# Patient Record
Sex: FEMALE | Race: WHITE | Employment: UNEMPLOYED | URBAN - NONMETROPOLITAN AREA
[De-identification: names, ages, dates, MRNs, and addresses within clinical notes are randomized per-mention and may not be internally consistent; named-entity substitution may affect disease eponyms.]

---

## 2017-01-27 ENCOUNTER — OFFICE VISIT (OUTPATIENT)
Dept: OBGYN | Age: 37
End: 2017-01-27
Payer: COMMERCIAL

## 2017-01-27 VITALS
DIASTOLIC BLOOD PRESSURE: 84 MMHG | HEIGHT: 63 IN | SYSTOLIC BLOOD PRESSURE: 119 MMHG | WEIGHT: 146 LBS | HEART RATE: 107 BPM | BODY MASS INDEX: 25.87 KG/M2

## 2017-01-27 DIAGNOSIS — L72.3 SEBACEOUS CYST: ICD-10-CM

## 2017-01-27 DIAGNOSIS — N90.89 SKIN TAG OF FEMALE PERINEUM: ICD-10-CM

## 2017-01-27 DIAGNOSIS — Z01.419 WELL WOMAN EXAM WITH ROUTINE GYNECOLOGICAL EXAM: Primary | ICD-10-CM

## 2017-01-27 DIAGNOSIS — Z12.4 CERVICAL CANCER SCREENING: ICD-10-CM

## 2017-01-27 DIAGNOSIS — N90.60 LABIAL HYPERTROPHY: ICD-10-CM

## 2017-01-27 PROCEDURE — 99395 PREV VISIT EST AGE 18-39: CPT | Performed by: OBSTETRICS & GYNECOLOGY

## 2017-01-27 RX ORDER — AZELASTINE HCL 205.5 UG/1
2 SPRAY NASAL 2 TIMES DAILY PRN
COMMUNITY
Start: 2016-11-01 | End: 2017-11-09

## 2017-01-27 RX ORDER — RIZATRIPTAN BENZOATE 5 MG/1
5 TABLET ORAL
COMMUNITY
Start: 2016-10-24 | End: 2020-02-11 | Stop reason: SDUPTHER

## 2017-01-27 ASSESSMENT — ENCOUNTER SYMPTOMS
RESPIRATORY NEGATIVE: 1
EYES NEGATIVE: 1
GASTROINTESTINAL NEGATIVE: 1

## 2017-02-10 ENCOUNTER — HOSPITAL ENCOUNTER (OUTPATIENT)
Dept: PREADMISSION TESTING | Age: 37
Discharge: HOME | End: 2017-02-10
Payer: COMMERCIAL

## 2017-02-10 ENCOUNTER — OFFICE VISIT (OUTPATIENT)
Dept: OBGYN | Age: 37
End: 2017-02-10

## 2017-02-10 VITALS
WEIGHT: 148 LBS | SYSTOLIC BLOOD PRESSURE: 100 MMHG | HEIGHT: 63 IN | DIASTOLIC BLOOD PRESSURE: 60 MMHG | BODY MASS INDEX: 26.22 KG/M2

## 2017-02-10 VITALS — BODY MASS INDEX: 26.22 KG/M2 | WEIGHT: 148 LBS | HEIGHT: 63 IN

## 2017-02-10 DIAGNOSIS — N90.89 SKIN TAG OF FEMALE PERINEUM: ICD-10-CM

## 2017-02-10 DIAGNOSIS — N94.10 DYSPAREUNIA IN FEMALE: ICD-10-CM

## 2017-02-10 DIAGNOSIS — Z01.818 PRE-OP EXAMINATION: Primary | ICD-10-CM

## 2017-02-10 DIAGNOSIS — L72.3 SEBACEOUS CYST: ICD-10-CM

## 2017-02-10 DIAGNOSIS — N90.60 LABIAL HYPERTROPHY: ICD-10-CM

## 2017-02-10 LAB
BASOPHILS ABSOLUTE: 0 K/UL (ref 0–0.2)
BASOPHILS RELATIVE PERCENT: 0.2 % (ref 0–1)
EOSINOPHILS ABSOLUTE: 0.3 K/UL (ref 0–0.6)
EOSINOPHILS RELATIVE PERCENT: 3.4 % (ref 0–5)
HCT VFR BLD CALC: 42.4 % (ref 37–47)
HEMOGLOBIN: 14.5 G/DL (ref 12–16)
LYMPHOCYTES ABSOLUTE: 2.6 K/UL (ref 1.1–4.5)
LYMPHOCYTES RELATIVE PERCENT: 26.7 % (ref 20–40)
MCH RBC QN AUTO: 33.1 PG (ref 27–31)
MCHC RBC AUTO-ENTMCNC: 34.2 G/DL (ref 33–37)
MCV RBC AUTO: 96.8 FL (ref 81–99)
MONOCYTES ABSOLUTE: 0.7 K/UL (ref 0–0.9)
MONOCYTES RELATIVE PERCENT: 7.4 % (ref 0–10)
NEUTROPHILS ABSOLUTE: 6.1 K/UL (ref 1.5–7.5)
NEUTROPHILS RELATIVE PERCENT: 62.3 % (ref 50–65)
PDW BLD-RTO: 13.6 % (ref 11.5–14.5)
PLATELET # BLD: 331 K/UL (ref 130–400)
PMV BLD AUTO: 10.4 FL (ref 7.4–10.4)
RBC # BLD: 4.38 M/UL (ref 4.2–5.4)
WBC # BLD: 9.7 K/UL (ref 4.8–10.8)

## 2017-02-10 PROCEDURE — PREOPEXAM PRE-OP EXAM: Performed by: OBSTETRICS & GYNECOLOGY

## 2017-02-10 PROCEDURE — 85025 COMPLETE CBC W/AUTO DIFF WBC: CPT

## 2017-02-10 RX ORDER — TOPIRAMATE 50 MG/1
TABLET, FILM COATED ORAL
Refills: 2 | COMMUNITY
Start: 2017-02-01 | End: 2019-02-07 | Stop reason: ALTCHOICE

## 2017-02-10 RX ORDER — TIZANIDINE 4 MG/1
4 TABLET ORAL NIGHTLY
COMMUNITY
End: 2017-03-07

## 2017-02-10 ASSESSMENT — ENCOUNTER SYMPTOMS
EYES NEGATIVE: 1
RESPIRATORY NEGATIVE: 1
ALLERGIC/IMMUNOLOGIC NEGATIVE: 1
GASTROINTESTINAL NEGATIVE: 1

## 2017-02-16 ENCOUNTER — ANESTHESIA EVENT (OUTPATIENT)
Dept: OPERATING ROOM | Age: 37
End: 2017-02-16
Payer: COMMERCIAL

## 2017-02-16 ENCOUNTER — ANESTHESIA (OUTPATIENT)
Dept: OPERATING ROOM | Age: 37
End: 2017-02-16
Payer: COMMERCIAL

## 2017-02-16 ENCOUNTER — HOSPITAL ENCOUNTER (OUTPATIENT)
Age: 37
Setting detail: OUTPATIENT SURGERY
Discharge: HOME | End: 2017-02-16
Attending: OBSTETRICS & GYNECOLOGY | Admitting: OBSTETRICS & GYNECOLOGY
Payer: COMMERCIAL

## 2017-02-16 ENCOUNTER — SURGERY (OUTPATIENT)
Age: 37
End: 2017-02-16

## 2017-02-16 VITALS
OXYGEN SATURATION: 100 % | HEIGHT: 63 IN | RESPIRATION RATE: 16 BRPM | HEART RATE: 70 BPM | BODY MASS INDEX: 26.22 KG/M2 | DIASTOLIC BLOOD PRESSURE: 57 MMHG | SYSTOLIC BLOOD PRESSURE: 106 MMHG | TEMPERATURE: 98 F | WEIGHT: 148 LBS

## 2017-02-16 VITALS
SYSTOLIC BLOOD PRESSURE: 118 MMHG | TEMPERATURE: 97 F | OXYGEN SATURATION: 100 % | DIASTOLIC BLOOD PRESSURE: 52 MMHG | RESPIRATION RATE: 1 BRPM

## 2017-02-16 PROBLEM — N90.89 VULVAR SKIN TAG: Status: ACTIVE | Noted: 2017-02-16

## 2017-02-16 PROBLEM — N90.60 LABIAL HYPERTROPHY: Status: ACTIVE | Noted: 2017-02-16

## 2017-02-16 PROBLEM — N89.8 VAGINAL INCLUSION CYST: Status: ACTIVE | Noted: 2017-02-16

## 2017-02-16 LAB — HCG(URINE) PREGNANCY TEST: NEGATIVE

## 2017-02-16 PROCEDURE — 7100000010 HC PHASE II RECOVERY - FIRST 15 MIN: Performed by: OBSTETRICS & GYNECOLOGY

## 2017-02-16 PROCEDURE — 2580000003 HC RX 258: Performed by: OBSTETRICS & GYNECOLOGY

## 2017-02-16 PROCEDURE — 2500000003 HC RX 250 WO HCPCS: Performed by: OBSTETRICS & GYNECOLOGY

## 2017-02-16 PROCEDURE — 7100000000 HC PACU RECOVERY - FIRST 15 MIN: Performed by: OBSTETRICS & GYNECOLOGY

## 2017-02-16 PROCEDURE — 7100000011 HC PHASE II RECOVERY - ADDTL 15 MIN: Performed by: OBSTETRICS & GYNECOLOGY

## 2017-02-16 PROCEDURE — 6360000002 HC RX W HCPCS: Performed by: NURSE ANESTHETIST, CERTIFIED REGISTERED

## 2017-02-16 PROCEDURE — 2720000003 HC MISC SUTURE/STAPLES/RELOADS/ETC: Performed by: OBSTETRICS & GYNECOLOGY

## 2017-02-16 PROCEDURE — 2720000001 HC MISC SURG SUPPLY STERILE $51-500: Performed by: OBSTETRICS & GYNECOLOGY

## 2017-02-16 PROCEDURE — 56620 VULVECTOMY SIMPLE PARTIAL: CPT | Performed by: OBSTETRICS & GYNECOLOGY

## 2017-02-16 PROCEDURE — 2500000003 HC RX 250 WO HCPCS: Performed by: ANESTHESIOLOGY

## 2017-02-16 PROCEDURE — 3600000004 HC SURGERY LEVEL 4 BASE: Performed by: OBSTETRICS & GYNECOLOGY

## 2017-02-16 PROCEDURE — 3700000000 HC ANESTHESIA ATTENDED CARE: Performed by: OBSTETRICS & GYNECOLOGY

## 2017-02-16 PROCEDURE — 2500000003 HC RX 250 WO HCPCS: Performed by: NURSE ANESTHETIST, CERTIFIED REGISTERED

## 2017-02-16 PROCEDURE — 6360000002 HC RX W HCPCS: Performed by: ANESTHESIOLOGY

## 2017-02-16 PROCEDURE — 6370000000 HC RX 637 (ALT 250 FOR IP): Performed by: ANESTHESIOLOGY

## 2017-02-16 PROCEDURE — 81025 URINE PREGNANCY TEST: CPT

## 2017-02-16 PROCEDURE — 3600000014 HC SURGERY LEVEL 4 ADDTL 15MIN: Performed by: OBSTETRICS & GYNECOLOGY

## 2017-02-16 PROCEDURE — 3700000001 HC ADD 15 MINUTES (ANESTHESIA): Performed by: OBSTETRICS & GYNECOLOGY

## 2017-02-16 PROCEDURE — 6360000002 HC RX W HCPCS: Performed by: OBSTETRICS & GYNECOLOGY

## 2017-02-16 PROCEDURE — 7100000001 HC PACU RECOVERY - ADDTL 15 MIN: Performed by: OBSTETRICS & GYNECOLOGY

## 2017-02-16 PROCEDURE — 6370000000 HC RX 637 (ALT 250 FOR IP): Performed by: OBSTETRICS & GYNECOLOGY

## 2017-02-16 RX ORDER — MIDAZOLAM HYDROCHLORIDE 1 MG/ML
INJECTION INTRAMUSCULAR; INTRAVENOUS PRN
Status: DISCONTINUED | OUTPATIENT
Start: 2017-02-16 | End: 2017-02-16 | Stop reason: SDUPTHER

## 2017-02-16 RX ORDER — ENALAPRILAT 2.5 MG/2ML
1.25 INJECTION INTRAVENOUS
Status: DISCONTINUED | OUTPATIENT
Start: 2017-02-16 | End: 2017-02-16 | Stop reason: HOSPADM

## 2017-02-16 RX ORDER — PROPOFOL 10 MG/ML
INJECTION, EMULSION INTRAVENOUS PRN
Status: DISCONTINUED | OUTPATIENT
Start: 2017-02-16 | End: 2017-02-16 | Stop reason: SDUPTHER

## 2017-02-16 RX ORDER — SCOLOPAMINE TRANSDERMAL SYSTEM 1 MG/1
1 PATCH, EXTENDED RELEASE TRANSDERMAL
Status: DISCONTINUED | OUTPATIENT
Start: 2017-02-16 | End: 2017-02-16 | Stop reason: HOSPADM

## 2017-02-16 RX ORDER — BUPIVACAINE HYDROCHLORIDE AND EPINEPHRINE 2.5; 5 MG/ML; UG/ML
INJECTION, SOLUTION EPIDURAL; INFILTRATION; INTRACAUDAL; PERINEURAL PRN
Status: DISCONTINUED | OUTPATIENT
Start: 2017-02-16 | End: 2017-02-16 | Stop reason: HOSPADM

## 2017-02-16 RX ORDER — OXYCODONE AND ACETAMINOPHEN 7.5; 325 MG/1; MG/1
1 TABLET ORAL EVERY 4 HOURS PRN
Qty: 45 TABLET | Refills: 0 | Status: SHIPPED | OUTPATIENT
Start: 2017-02-16 | End: 2017-02-23

## 2017-02-16 RX ORDER — MORPHINE SULFATE 4 MG/ML
2 INJECTION, SOLUTION INTRAMUSCULAR; INTRAVENOUS EVERY 5 MIN PRN
Status: DISCONTINUED | OUTPATIENT
Start: 2017-02-16 | End: 2017-02-16 | Stop reason: HOSPADM

## 2017-02-16 RX ORDER — OXYCODONE AND ACETAMINOPHEN 7.5; 325 MG/1; MG/1
TABLET ORAL
Status: DISCONTINUED
Start: 2017-02-16 | End: 2017-02-16 | Stop reason: HOSPADM

## 2017-02-16 RX ORDER — LABETALOL HYDROCHLORIDE 5 MG/ML
5 INJECTION, SOLUTION INTRAVENOUS EVERY 10 MIN PRN
Status: DISCONTINUED | OUTPATIENT
Start: 2017-02-16 | End: 2017-02-16 | Stop reason: HOSPADM

## 2017-02-16 RX ORDER — MEPERIDINE HYDROCHLORIDE 25 MG/ML
12.5 INJECTION INTRAMUSCULAR; INTRAVENOUS; SUBCUTANEOUS EVERY 5 MIN PRN
Status: DISCONTINUED | OUTPATIENT
Start: 2017-02-16 | End: 2017-02-16 | Stop reason: HOSPADM

## 2017-02-16 RX ORDER — FENTANYL CITRATE 50 UG/ML
25 INJECTION, SOLUTION INTRAMUSCULAR; INTRAVENOUS
Status: DISCONTINUED | OUTPATIENT
Start: 2017-02-16 | End: 2017-02-16 | Stop reason: HOSPADM

## 2017-02-16 RX ORDER — ONDANSETRON 2 MG/ML
INJECTION INTRAMUSCULAR; INTRAVENOUS PRN
Status: DISCONTINUED | OUTPATIENT
Start: 2017-02-16 | End: 2017-02-16 | Stop reason: SDUPTHER

## 2017-02-16 RX ORDER — EPHEDRINE SULFATE 50 MG/ML
INJECTION, SOLUTION INTRAVENOUS PRN
Status: DISCONTINUED | OUTPATIENT
Start: 2017-02-16 | End: 2017-02-16 | Stop reason: SDUPTHER

## 2017-02-16 RX ORDER — DIPHENHYDRAMINE HYDROCHLORIDE 50 MG/ML
12.5 INJECTION INTRAMUSCULAR; INTRAVENOUS
Status: DISCONTINUED | OUTPATIENT
Start: 2017-02-16 | End: 2017-02-16 | Stop reason: HOSPADM

## 2017-02-16 RX ORDER — LIDOCAINE HYDROCHLORIDE 10 MG/ML
1 INJECTION, SOLUTION EPIDURAL; INFILTRATION; INTRACAUDAL; PERINEURAL
Status: COMPLETED | OUTPATIENT
Start: 2017-02-16 | End: 2017-02-16

## 2017-02-16 RX ORDER — SODIUM CHLORIDE 0.9 % (FLUSH) 0.9 %
10 SYRINGE (ML) INJECTION PRN
Status: DISCONTINUED | OUTPATIENT
Start: 2017-02-16 | End: 2017-02-16 | Stop reason: HOSPADM

## 2017-02-16 RX ORDER — PROMETHAZINE HYDROCHLORIDE 25 MG/ML
6.25 INJECTION, SOLUTION INTRAMUSCULAR; INTRAVENOUS
Status: DISCONTINUED | OUTPATIENT
Start: 2017-02-16 | End: 2017-02-16 | Stop reason: HOSPADM

## 2017-02-16 RX ORDER — HYDRALAZINE HYDROCHLORIDE 20 MG/ML
5 INJECTION INTRAMUSCULAR; INTRAVENOUS EVERY 10 MIN PRN
Status: DISCONTINUED | OUTPATIENT
Start: 2017-02-16 | End: 2017-02-16 | Stop reason: HOSPADM

## 2017-02-16 RX ORDER — OXYCODONE AND ACETAMINOPHEN 7.5; 325 MG/1; MG/1
1 TABLET ORAL
Status: COMPLETED | OUTPATIENT
Start: 2017-02-16 | End: 2017-02-16

## 2017-02-16 RX ORDER — MORPHINE SULFATE 4 MG/ML
1 INJECTION, SOLUTION INTRAMUSCULAR; INTRAVENOUS EVERY 5 MIN PRN
Status: DISCONTINUED | OUTPATIENT
Start: 2017-02-16 | End: 2017-02-16 | Stop reason: HOSPADM

## 2017-02-16 RX ORDER — APREPITANT 40 MG/1
40 CAPSULE ORAL ONCE
Status: COMPLETED | OUTPATIENT
Start: 2017-02-16 | End: 2017-02-16

## 2017-02-16 RX ORDER — ONDANSETRON 2 MG/ML
4 INJECTION INTRAMUSCULAR; INTRAVENOUS
Status: DISCONTINUED | OUTPATIENT
Start: 2017-02-16 | End: 2017-02-16 | Stop reason: HOSPADM

## 2017-02-16 RX ORDER — SODIUM CHLORIDE, SODIUM LACTATE, POTASSIUM CHLORIDE, CALCIUM CHLORIDE 600; 310; 30; 20 MG/100ML; MG/100ML; MG/100ML; MG/100ML
INJECTION, SOLUTION INTRAVENOUS CONTINUOUS
Status: DISCONTINUED | OUTPATIENT
Start: 2017-02-16 | End: 2017-02-16 | Stop reason: HOSPADM

## 2017-02-16 RX ORDER — KETOROLAC TROMETHAMINE 30 MG/ML
INJECTION, SOLUTION INTRAMUSCULAR; INTRAVENOUS PRN
Status: DISCONTINUED | OUTPATIENT
Start: 2017-02-16 | End: 2017-02-16 | Stop reason: SDUPTHER

## 2017-02-16 RX ORDER — FENTANYL CITRATE 50 UG/ML
50 INJECTION, SOLUTION INTRAMUSCULAR; INTRAVENOUS
Status: DISCONTINUED | OUTPATIENT
Start: 2017-02-16 | End: 2017-02-16 | Stop reason: HOSPADM

## 2017-02-16 RX ORDER — DEXAMETHASONE SODIUM PHOSPHATE 4 MG/ML
INJECTION, SOLUTION INTRA-ARTICULAR; INTRALESIONAL; INTRAMUSCULAR; INTRAVENOUS; SOFT TISSUE PRN
Status: DISCONTINUED | OUTPATIENT
Start: 2017-02-16 | End: 2017-02-16 | Stop reason: SDUPTHER

## 2017-02-16 RX ORDER — LIDOCAINE HYDROCHLORIDE 10 MG/ML
1 INJECTION, SOLUTION EPIDURAL; INFILTRATION; INTRACAUDAL; PERINEURAL ONCE
Status: DISCONTINUED | OUTPATIENT
Start: 2017-02-16 | End: 2017-02-16 | Stop reason: HOSPADM

## 2017-02-16 RX ORDER — SODIUM CHLORIDE 0.9 % (FLUSH) 0.9 %
10 SYRINGE (ML) INJECTION EVERY 12 HOURS SCHEDULED
Status: DISCONTINUED | OUTPATIENT
Start: 2017-02-16 | End: 2017-02-16 | Stop reason: HOSPADM

## 2017-02-16 RX ORDER — LIDOCAINE HYDROCHLORIDE 10 MG/ML
INJECTION, SOLUTION EPIDURAL; INFILTRATION; INTRACAUDAL; PERINEURAL PRN
Status: DISCONTINUED | OUTPATIENT
Start: 2017-02-16 | End: 2017-02-16 | Stop reason: SDUPTHER

## 2017-02-16 RX ORDER — FENTANYL CITRATE 50 UG/ML
INJECTION, SOLUTION INTRAMUSCULAR; INTRAVENOUS PRN
Status: DISCONTINUED | OUTPATIENT
Start: 2017-02-16 | End: 2017-02-16 | Stop reason: SDUPTHER

## 2017-02-16 RX ORDER — MIDAZOLAM HYDROCHLORIDE 1 MG/ML
2 INJECTION INTRAMUSCULAR; INTRAVENOUS
Status: DISCONTINUED | OUTPATIENT
Start: 2017-02-16 | End: 2017-02-16 | Stop reason: HOSPADM

## 2017-02-16 RX ADMIN — LIDOCAINE HYDROCHLORIDE 50 MG: 10 INJECTION, SOLUTION EPIDURAL; INFILTRATION; INTRACAUDAL; PERINEURAL at 09:41

## 2017-02-16 RX ADMIN — EPHEDRINE SULFATE 10 MG: 50 INJECTION, SOLUTION INTRAMUSCULAR; INTRAVENOUS; SUBCUTANEOUS at 09:58

## 2017-02-16 RX ADMIN — SODIUM CHLORIDE, SODIUM LACTATE, POTASSIUM CHLORIDE, AND CALCIUM CHLORIDE: 600; 310; 30; 20 INJECTION, SOLUTION INTRAVENOUS at 08:20

## 2017-02-16 RX ADMIN — FENTANYL CITRATE 50 MCG: 50 INJECTION INTRAMUSCULAR; INTRAVENOUS at 09:43

## 2017-02-16 RX ADMIN — MIDAZOLAM HYDROCHLORIDE 2 MG: 1 INJECTION, SOLUTION INTRAMUSCULAR; INTRAVENOUS at 09:39

## 2017-02-16 RX ADMIN — FENTANYL CITRATE 50 MCG: 50 INJECTION INTRAMUSCULAR; INTRAVENOUS at 09:39

## 2017-02-16 RX ADMIN — APREPITANT 40 MG: 40 CAPSULE ORAL at 08:21

## 2017-02-16 RX ADMIN — CEFAZOLIN SODIUM 1 G: 1 INJECTION, SOLUTION INTRAVENOUS at 09:44

## 2017-02-16 RX ADMIN — PROPOFOL 150 MG: 10 INJECTION, EMULSION INTRAVENOUS at 09:41

## 2017-02-16 RX ADMIN — KETOROLAC TROMETHAMINE 30 MG: 30 INJECTION, SOLUTION INTRAMUSCULAR at 09:49

## 2017-02-16 RX ADMIN — ONDANSETRON HYDROCHLORIDE 4 MG: 2 INJECTION, SOLUTION INTRAVENOUS at 09:49

## 2017-02-16 RX ADMIN — OXYCODONE HYDROCHLORIDE AND ACETAMINOPHEN 1 TABLET: 7.5; 325 TABLET ORAL at 12:12

## 2017-02-16 RX ADMIN — DEXAMETHASONE SODIUM PHOSPHATE 5 MG: 4 INJECTION, SOLUTION INTRAMUSCULAR; INTRAVENOUS at 09:49

## 2017-02-16 RX ADMIN — LIDOCAINE HYDROCHLORIDE 1 ML: 10 INJECTION, SOLUTION EPIDURAL; INFILTRATION; INTRACAUDAL; PERINEURAL at 08:20

## 2017-02-16 RX ADMIN — SILVER SULFADIAZINE 1 MG: 10 CREAM TOPICAL at 10:00

## 2017-02-16 RX ADMIN — BUPIVACAINE HYDROCHLORIDE AND EPINEPHRINE 30 ML: 2.5; 5 INJECTION, SOLUTION EPIDURAL; INFILTRATION; INTRACAUDAL; PERINEURAL at 10:00

## 2017-02-16 RX ADMIN — SODIUM CHLORIDE, SODIUM LACTATE, POTASSIUM CHLORIDE, AND CALCIUM CHLORIDE: 600; 310; 30; 20 INJECTION, SOLUTION INTRAVENOUS at 10:29

## 2017-02-16 ASSESSMENT — PAIN DESCRIPTION - DESCRIPTORS
DESCRIPTORS: ACHING
DESCRIPTORS: ACHING

## 2017-02-16 ASSESSMENT — PAIN DESCRIPTION - ONSET
ONSET: AWAKENED FROM SLEEP
ONSET: AWAKENED FROM SLEEP

## 2017-02-16 ASSESSMENT — PAIN DESCRIPTION - FREQUENCY
FREQUENCY: CONTINUOUS
FREQUENCY: CONTINUOUS

## 2017-02-16 ASSESSMENT — PAIN DESCRIPTION - LOCATION
LOCATION: LABIA
LOCATION: LABIA

## 2017-02-16 ASSESSMENT — PAIN - FUNCTIONAL ASSESSMENT: PAIN_FUNCTIONAL_ASSESSMENT: 0-10

## 2017-02-16 ASSESSMENT — PAIN SCALES - GENERAL
PAINLEVEL_OUTOF10: 6
PAINLEVEL_OUTOF10: 0
PAINLEVEL_OUTOF10: 7

## 2017-02-16 ASSESSMENT — PAIN DESCRIPTION - PROGRESSION
CLINICAL_PROGRESSION: NOT CHANGED
CLINICAL_PROGRESSION: NOT CHANGED

## 2017-02-16 ASSESSMENT — PAIN DESCRIPTION - PAIN TYPE
TYPE: SURGICAL PAIN
TYPE: SURGICAL PAIN

## 2017-02-23 ENCOUNTER — TELEPHONE (OUTPATIENT)
Dept: OBGYN | Age: 37
End: 2017-02-23

## 2017-03-07 ENCOUNTER — OFFICE VISIT (OUTPATIENT)
Dept: OBGYN | Age: 37
End: 2017-03-07

## 2017-03-07 VITALS
SYSTOLIC BLOOD PRESSURE: 100 MMHG | HEIGHT: 63 IN | WEIGHT: 145 LBS | DIASTOLIC BLOOD PRESSURE: 60 MMHG | BODY MASS INDEX: 25.69 KG/M2

## 2017-03-07 DIAGNOSIS — Z09 POSTOP CHECK: Primary | ICD-10-CM

## 2017-03-07 PROCEDURE — 99024 POSTOP FOLLOW-UP VISIT: CPT | Performed by: OBSTETRICS & GYNECOLOGY

## 2017-03-07 ASSESSMENT — ENCOUNTER SYMPTOMS
EYES NEGATIVE: 1
RESPIRATORY NEGATIVE: 1
ALLERGIC/IMMUNOLOGIC NEGATIVE: 1

## 2017-03-16 ENCOUNTER — LAB REQUISITION (OUTPATIENT)
Dept: LAB | Facility: HOSPITAL | Age: 37
End: 2017-03-16

## 2017-03-16 DIAGNOSIS — Z00.00 ENCOUNTER FOR GENERAL ADULT MEDICAL EXAMINATION WITHOUT ABNORMAL FINDINGS: ICD-10-CM

## 2017-03-16 PROCEDURE — 87086 URINE CULTURE/COLONY COUNT: CPT | Performed by: INTERNAL MEDICINE

## 2017-03-18 LAB — BACTERIA SPEC AEROBE CULT: ABNORMAL

## 2017-03-21 ENCOUNTER — OFFICE VISIT (OUTPATIENT)
Dept: OBGYN | Age: 37
End: 2017-03-21

## 2017-03-21 VITALS
SYSTOLIC BLOOD PRESSURE: 120 MMHG | HEIGHT: 63 IN | DIASTOLIC BLOOD PRESSURE: 60 MMHG | BODY MASS INDEX: 25.69 KG/M2 | WEIGHT: 145 LBS

## 2017-03-21 DIAGNOSIS — L98.7 REDUNDANT SKIN: ICD-10-CM

## 2017-03-21 DIAGNOSIS — Z09 POSTOP CHECK: Primary | ICD-10-CM

## 2017-03-21 PROCEDURE — 99024 POSTOP FOLLOW-UP VISIT: CPT | Performed by: OBSTETRICS & GYNECOLOGY

## 2017-03-21 RX ORDER — FUROSEMIDE 20 MG/1
20 TABLET ORAL DAILY
COMMUNITY
End: 2021-11-19 | Stop reason: SDUPTHER

## 2017-03-21 ASSESSMENT — ENCOUNTER SYMPTOMS
EYES NEGATIVE: 1
GASTROINTESTINAL NEGATIVE: 1
ALLERGIC/IMMUNOLOGIC NEGATIVE: 1
RESPIRATORY NEGATIVE: 1

## 2017-03-31 ENCOUNTER — TELEPHONE (OUTPATIENT)
Dept: OBGYN | Age: 37
End: 2017-03-31

## 2017-05-03 ENCOUNTER — OFFICE VISIT (OUTPATIENT)
Dept: OBGYN | Age: 37
End: 2017-05-03
Payer: COMMERCIAL

## 2017-05-03 VITALS
WEIGHT: 140 LBS | DIASTOLIC BLOOD PRESSURE: 70 MMHG | HEIGHT: 63 IN | SYSTOLIC BLOOD PRESSURE: 128 MMHG | BODY MASS INDEX: 24.8 KG/M2

## 2017-05-03 DIAGNOSIS — Z09 POSTOPERATIVE FOLLOW-UP: Primary | ICD-10-CM

## 2017-05-03 PROCEDURE — 99213 OFFICE O/P EST LOW 20 MIN: CPT | Performed by: OBSTETRICS & GYNECOLOGY

## 2017-05-03 RX ORDER — ESTRADIOL 0.1 MG/G
1 CREAM VAGINAL
Qty: 1 TUBE | Refills: 5 | Status: SHIPPED | OUTPATIENT
Start: 2017-05-04 | End: 2019-02-07

## 2017-05-03 RX ORDER — CLOBETASOL PROPIONATE 0.5 MG/G
1 OINTMENT TOPICAL 2 TIMES DAILY PRN
Qty: 1 TUBE | Refills: 5 | Status: SHIPPED | OUTPATIENT
Start: 2017-05-03 | End: 2019-02-07

## 2017-05-03 ASSESSMENT — ENCOUNTER SYMPTOMS
GASTROINTESTINAL NEGATIVE: 1
EYES NEGATIVE: 1
ALLERGIC/IMMUNOLOGIC NEGATIVE: 1
RESPIRATORY NEGATIVE: 1

## 2017-07-17 RX ORDER — DICYCLOMINE HYDROCHLORIDE 10 MG/1
10 CAPSULE ORAL DAILY PRN
Qty: 120 CAPSULE | Refills: 0 | Status: SHIPPED | OUTPATIENT
Start: 2017-07-17 | End: 2017-11-02 | Stop reason: SDUPTHER

## 2017-07-17 RX ORDER — ERGOCALCIFEROL 1.25 MG/1
50000 CAPSULE ORAL WEEKLY
Qty: 30 CAPSULE | Refills: 2 | Status: SHIPPED | OUTPATIENT
Start: 2017-07-17 | End: 2017-07-18 | Stop reason: SDUPTHER

## 2017-07-18 RX ORDER — ERGOCALCIFEROL 1.25 MG/1
50000 CAPSULE ORAL WEEKLY
Qty: 30 CAPSULE | Refills: 2 | Status: SHIPPED | OUTPATIENT
Start: 2017-07-18 | End: 2017-07-21 | Stop reason: SDUPTHER

## 2017-07-21 RX ORDER — ERGOCALCIFEROL 1.25 MG/1
CAPSULE ORAL
Qty: 8 CAPSULE | Refills: 2 | Status: SHIPPED | OUTPATIENT
Start: 2017-07-21 | End: 2017-11-09 | Stop reason: SDUPTHER

## 2017-10-24 ENCOUNTER — OFFICE VISIT (OUTPATIENT)
Dept: URGENT CARE | Age: 37
End: 2017-10-24
Payer: MEDICAID

## 2017-10-24 VITALS
WEIGHT: 137.6 LBS | HEIGHT: 63 IN | RESPIRATION RATE: 20 BRPM | HEART RATE: 80 BPM | DIASTOLIC BLOOD PRESSURE: 71 MMHG | OXYGEN SATURATION: 99 % | TEMPERATURE: 101.5 F | BODY MASS INDEX: 24.38 KG/M2 | SYSTOLIC BLOOD PRESSURE: 119 MMHG

## 2017-10-24 DIAGNOSIS — J06.9 URI WITH COUGH AND CONGESTION: ICD-10-CM

## 2017-10-24 DIAGNOSIS — R06.02 SHORTNESS OF BREATH: ICD-10-CM

## 2017-10-24 DIAGNOSIS — J40 BRONCHITIS: Primary | ICD-10-CM

## 2017-10-24 DIAGNOSIS — J02.9 SORE THROAT: ICD-10-CM

## 2017-10-24 DIAGNOSIS — R52 BODY ACHES: ICD-10-CM

## 2017-10-24 DIAGNOSIS — F17.210 CIGARETTE SMOKER: ICD-10-CM

## 2017-10-24 LAB
INFLUENZA A ANTIBODY: NORMAL
INFLUENZA B ANTIBODY: NORMAL
S PYO AG THROAT QL: NORMAL

## 2017-10-24 PROCEDURE — 99213 OFFICE O/P EST LOW 20 MIN: CPT | Performed by: NURSE PRACTITIONER

## 2017-10-24 PROCEDURE — 87804 INFLUENZA ASSAY W/OPTIC: CPT | Performed by: NURSE PRACTITIONER

## 2017-10-24 PROCEDURE — 87880 STREP A ASSAY W/OPTIC: CPT | Performed by: NURSE PRACTITIONER

## 2017-10-24 RX ORDER — AMOXICILLIN AND CLAVULANATE POTASSIUM 875; 125 MG/1; MG/1
1 TABLET, FILM COATED ORAL EVERY 12 HOURS
Qty: 20 TABLET | Refills: 0 | Status: SHIPPED | OUTPATIENT
Start: 2017-10-24 | End: 2017-11-03

## 2017-10-24 RX ORDER — DEXAMETHASONE SODIUM PHOSPHATE 10 MG/ML
10 INJECTION INTRAMUSCULAR; INTRAVENOUS ONCE
Status: COMPLETED | OUTPATIENT
Start: 2017-10-24 | End: 2017-10-24

## 2017-10-24 RX ORDER — BUSPIRONE HYDROCHLORIDE 5 MG/1
5 TABLET ORAL 2 TIMES DAILY
COMMUNITY
End: 2021-03-22 | Stop reason: DRUGHIGH

## 2017-10-24 RX ORDER — ALBUTEROL SULFATE 90 UG/1
2 AEROSOL, METERED RESPIRATORY (INHALATION) EVERY 6 HOURS PRN
Qty: 1 INHALER | Refills: 0 | Status: SHIPPED | OUTPATIENT
Start: 2017-10-24 | End: 2018-02-07

## 2017-10-24 RX ORDER — BENZONATATE 200 MG/1
200 CAPSULE ORAL 3 TIMES DAILY PRN
Qty: 21 CAPSULE | Refills: 0 | Status: SHIPPED | OUTPATIENT
Start: 2017-10-24 | End: 2017-10-31

## 2017-10-24 RX ORDER — GUAIFENESIN 600 MG/1
600 TABLET, EXTENDED RELEASE ORAL 2 TIMES DAILY
Qty: 14 TABLET | Refills: 0 | Status: SHIPPED | OUTPATIENT
Start: 2017-10-24 | End: 2017-11-09

## 2017-10-24 RX ORDER — DULOXETIN HYDROCHLORIDE 60 MG/1
60 CAPSULE, DELAYED RELEASE ORAL DAILY
COMMUNITY

## 2017-10-24 RX ORDER — FLUCONAZOLE 150 MG/1
TABLET ORAL
Qty: 2 TABLET | Refills: 0 | Status: SHIPPED | OUTPATIENT
Start: 2017-10-24 | End: 2017-11-09

## 2017-10-24 RX ORDER — METHYLPREDNISOLONE 4 MG/1
TABLET ORAL
Qty: 1 KIT | Refills: 0 | Status: SHIPPED | OUTPATIENT
Start: 2017-10-25 | End: 2017-10-30

## 2017-10-24 RX ADMIN — DEXAMETHASONE SODIUM PHOSPHATE 10 MG: 10 INJECTION INTRAMUSCULAR; INTRAVENOUS at 17:10

## 2017-10-24 ASSESSMENT — ENCOUNTER SYMPTOMS
SORE THROAT: 1
COUGH: 1
SINUS PRESSURE: 0
SHORTNESS OF BREATH: 1

## 2017-10-24 NOTE — PROGRESS NOTES
[address] [facility] [place]  [address] [facility] [place] [address]  Dept: [phone]  Loc: [phone]      Grant Flight is c/o of Pharyngitis (started on Saturday the 21st); Fatigue; Fever (in office 101.5); Headache; Generalized Body Aches; Cough (non-productive); Chest Congestion; and Shortness of Breath        HPI:     HPI   Patient presents with sore throat, fatigue, fever, headache,body aches, cough, and shortness of breath. Sore throat started 7 days ago, symptoms worsened today with fever of 101.5 noted in office. She states that she feels as though she cannot cough up phlegm. She is becoming short of breath with speaking. She has taken Delsym cough and chest congestion. She states that she is becoming weaker and more fatigued. She has been increasing vitamin C and b 12 as well. She explains that she has fibromyalgia and raynauds. She does smoke 10 cigarettes per day.      Past Medical History:   Diagnosis Date    Fibromyalgia     Fluid retention     H/O Raynaud's syndrome     Heartburn     Labial cyst     sebaceous cyst and skin tag    Spastic colon     Vitamin D deficiency       Past Surgical History:   Procedure Laterality Date    APPENDECTOMY      CHOLECYSTECTOMY      COLONOSCOPY  2010    OVARY REMOVAL      left    [place] LYSIS OF LABIAL ADHESIONS N/A 2/16/2017    INCISION AND DRAINAGE OF SEBACEOUS CYST ON LABIA; SKIN TAG REMOVAL OF PERINEUM  performed by Nasim Godoy MD at [place] [address] TYMPANOSTOMY TUBE PLACEMENT      UPPER GASTROINTESTINAL ENDOSCOPY      VULVA SURGERY N/A 2/16/2017    LABIALPLASTY  performed by Nasim Godoy MD at [facility] OR       Family History   Problem Relation Age of Onset    Breast Cancer Mother 61    Cancer Maternal Aunt      breast unsure of age   [facility] Cancer Paternal Aunt      breast unsure of age   [facility] Stroke Maternal Grandmother     Stroke Maternal Grandfather     Cancer Paternal Aunt      breast place, and time. No cranial nerve deficit. Skin: Skin is warm and dry. No rash noted. She is not diaphoretic. Psychiatric: She has a normal mood and affect. Her behavior is normal. Judgment normal.   Nursing note and vitals reviewed. /71   Pulse 80   Temp 101.5 °F (38.6 °C) (Temporal)   Resp 20   Ht 5' 3\" (1.6 m)   Wt 137 lb 9.6 oz (62.4 kg)   SpO2 99%   BMI 24.37 kg/m²     Results for orders placed or performed in visit on 10/24/17   POCT rapid strep A   Result Value Ref Range    Strep A Ag None Detected None Detected   POCT Influenza A/B   Result Value Ref Range    Influenza A Ab NEG     Influenza B Ab NEG        Assessment/ Plan      1. Bronchitis  benzonatate (TESSALON) 200 MG capsule    dexamethasone (DECADRON) injection 10 mg    methylPREDNISolone (MEDROL DOSEPACK) 4 MG tablet    guaiFENesin (MUCINEX) 600 MG extended release tablet   2. Sore throat  POCT rapid strep A    POCT Influenza A/B   3. Body aches  POCT rapid strep A    POCT Influenza A/B   4. Shortness of breath  albuterol sulfate HFA (PROVENTIL HFA) 108 (90 Base) MCG/ACT inhaler   5. URI with cough and congestion  amoxicillin-clavulanate (AUGMENTIN) 875-125 MG per tablet   6. Cigarette smoker       As symptoms have worsened as of today with new onset fever one week into illness I will begin Augmentin. Will place patient on steroid course and albuterol due to shortness of breath and bronchitis. Patient given educational materials - see patient instructions. Discussed use, benefit, and side effects of prescribed medications. All patient questions answered. Pt voiced understanding. Patient agreed with treatment plan. Follow up as needed    Patient Instructions     Patient Education        Bronchitis: Care Instructions  Your Care Instructions    Bronchitis is inflammation of the bronchial tubes, which carry air to the lungs. The tubes swell and produce mucus, or phlegm.  The mucus and inflamed bronchial tubes make you

## 2017-10-24 NOTE — PATIENT INSTRUCTIONS
Patient Education        Bronchitis: Care Instructions  Your Care Instructions    Bronchitis is inflammation of the bronchial tubes, which carry air to the lungs. The tubes swell and produce mucus, or phlegm. The mucus and inflamed bronchial tubes make you cough. You may have trouble breathing. Most cases of bronchitis are caused by viruses like those that cause colds. Antibiotics usually do not help and they may be harmful. Bronchitis usually develops rapidly and lasts about 2 to 3 weeks in otherwise healthy people. Follow-up care is a key part of your treatment and safety. Be sure to make and go to all appointments, and call your doctor if you are having problems. It's also a good idea to know your test results and keep a list of the medicines you take. How can you care for yourself at home? · Take all medicines exactly as prescribed. Call your doctor if you think you are having a problem with your medicine. · Get some extra rest.  · Take an over-the-counter pain medicine, such as acetaminophen (Tylenol), ibuprofen (Advil, Motrin), or naproxen (Aleve) to reduce fever and relieve body aches. Read and follow all instructions on the label. · Do not take two or more pain medicines at the same time unless the doctor told you to. Many pain medicines have acetaminophen, which is Tylenol. Too much acetaminophen (Tylenol) can be harmful. · Take an over-the-counter cough medicine that contains dextromethorphan to help quiet a dry, hacking cough so that you can sleep. Avoid cough medicines that have more than one active ingredient. Read and follow all instructions on the label. · Breathe moist air from a humidifier, hot shower, or sink filled with hot water. The heat and moisture will thin mucus so you can cough it out. · Do not smoke. Smoking can make bronchitis worse. If you need help quitting, talk to your doctor about stop-smoking programs and medicines.  These can increase your chances of quitting for good.  When should you call for help? Call 911 anytime you think you may need emergency care. For example, call if:  · You have severe trouble breathing. Call your doctor now or seek immediate medical care if:  · You have new or worse trouble breathing. · You cough up dark brown or bloody mucus (sputum). · You have a new or higher fever. · You have a new rash. Watch closely for changes in your health, and be sure to contact your doctor if:  · You cough more deeply or more often, especially if you notice more mucus or a change in the color of your mucus. · You are not getting better as expected. Where can you learn more? Go to https://Freebeepay.eXelate. org and sign in to your [employer] ([employer]) account. Enter H333 in the [employer] box to learn more about \"Bronchitis: Care Instructions. \"     If you do not have an account, please click on the \"Sign Up Now\" link. Current as of: March 25, 2017  Content Version: 11.3  © 9120-5893 [employer], Incorporated. Care instructions adapted under license by [facility] ([facility]). If you have questions about a medical condition or this instruction, always ask your healthcare professional. [place] [address] any warranty or liability for your use of this information. 1. Increase water intake, consider gatorade or pedialyte. 2. Take medications as directed  3.  Follow up if symptoms persist.

## 2017-11-02 ENCOUNTER — OFFICE VISIT (OUTPATIENT)
Dept: INTERNAL MEDICINE | Age: 37
End: 2017-11-02
Payer: MEDICAID

## 2017-11-02 VITALS
HEART RATE: 89 BPM | OXYGEN SATURATION: 97 % | DIASTOLIC BLOOD PRESSURE: 60 MMHG | SYSTOLIC BLOOD PRESSURE: 124 MMHG | WEIGHT: 135.5 LBS | HEIGHT: 63 IN | BODY MASS INDEX: 24.01 KG/M2

## 2017-11-02 DIAGNOSIS — R09.89 CHEST CONGESTION: ICD-10-CM

## 2017-11-02 DIAGNOSIS — M79.7 FIBROMYALGIA: ICD-10-CM

## 2017-11-02 DIAGNOSIS — R05.9 COUGH: Primary | ICD-10-CM

## 2017-11-02 PROCEDURE — 99214 OFFICE O/P EST MOD 30 MIN: CPT | Performed by: INTERNAL MEDICINE

## 2017-11-02 RX ORDER — PREDNISONE 5 MG/1
1 TABLET ORAL SEE ADMIN INSTRUCTIONS
Qty: 1 EACH | Refills: 0 | Status: SHIPPED | OUTPATIENT
Start: 2017-11-02 | End: 2017-11-09

## 2017-11-02 RX ORDER — DICYCLOMINE HYDROCHLORIDE 10 MG/1
10 CAPSULE ORAL DAILY PRN
Qty: 120 CAPSULE | Refills: 0 | Status: SHIPPED | OUTPATIENT
Start: 2017-11-02 | End: 2017-11-03 | Stop reason: SDUPTHER

## 2017-11-02 RX ORDER — LEVOFLOXACIN 500 MG/1
500 TABLET, FILM COATED ORAL DAILY
Qty: 10 TABLET | Refills: 0 | Status: SHIPPED | OUTPATIENT
Start: 2017-11-02 | End: 2017-11-02 | Stop reason: CLARIF

## 2017-11-02 RX ORDER — GUAIFENESIN AND CODEINE PHOSPHATE 100; 10 MG/5ML; MG/5ML
5 SOLUTION ORAL EVERY 4 HOURS PRN
Qty: 1 BOTTLE | Refills: 0 | Status: SHIPPED | OUTPATIENT
Start: 2017-11-02 | End: 2017-11-09

## 2017-11-02 RX ORDER — FLUCONAZOLE 100 MG/1
100 TABLET ORAL DAILY
Qty: 3 TABLET | Refills: 0 | Status: SHIPPED | OUTPATIENT
Start: 2017-11-02 | End: 2017-11-05

## 2017-11-02 RX ORDER — LEVOFLOXACIN 500 MG/1
500 TABLET, FILM COATED ORAL DAILY
Qty: 7 TABLET | Refills: 0 | Status: SHIPPED | OUTPATIENT
Start: 2017-11-02 | End: 2017-11-09

## 2017-11-02 ASSESSMENT — PATIENT HEALTH QUESTIONNAIRE - PHQ9
1. LITTLE INTEREST OR PLEASURE IN DOING THINGS: 0
SUM OF ALL RESPONSES TO PHQ9 QUESTIONS 1 & 2: 0
SUM OF ALL RESPONSES TO PHQ QUESTIONS 1-9: 0
2. FEELING DOWN, DEPRESSED OR HOPELESS: 0

## 2017-11-02 NOTE — PATIENT INSTRUCTIONS
levofloxacin (oral)  Pronunciation:  DAYSI voe FLOX a sin  Brand:  Levdev  What is the most important information I should know about levofloxacin? Levofloxacin may cause swelling or tearing of a tendon, especially if you are over 60, if you take steroid medication, or if you have had a kidney, heart, or lung transplant. What is levofloxacin? Levofloxacin is a fluoroquinolone (kfod-l-XZWH-o-lone) antibiotic that fights bacteria in the body. Levofloxacin is used to treat bacterial infections of the skin, sinuses, kidneys, bladder, or prostate. Levofloxacin is also used to treat bacterial infections that cause bronchitis or pneumonia, and to treat people who have been exposed to anthrax. Fluoroquinolone antibiotics can cause serious or disabling side effects. Levofloxacin should be used only for infections that cannot be treated with a safer antibiotic. Levofloxacin may also be used for purposes not listed in this medication guide. What should I discuss with my healthcare provider before taking levofloxacin? You should not use this medicine if you are allergic to levofloxacin or other fluoroquinolones (ciprofloxacin, gemifloxacin, moxifloxacin, ofloxacin, norfloxacin, and others). To make sure levofloxacin is safe for you, tell your doctor if you have:  · tendon problems, bone problems, arthritis or other joint problems (especially in children);  · slow heartbeats or other heart rhythm disorder (especially if you take medication to treat it);  · a personal or family history of long QT syndrome;  · liver or kidney disease;  · a history of epilepsy or other seizure disorder;  · a nerve disorder;  · diabetes (especially if you use insulin or take oral diabetes medication);  · low levels of potassium in your blood (hypokalemia); or  · if you use a blood thinner (warfarin, Coumadin, [place]) and have \"INR\" or prothrombin time tests.   Levofloxacin may cause swelling or tearing of a tendon (the fiber that connects bones to muscles in the body), especially in the Achilles' tendon of the heel. This can happen during treatment or up to several months after you stop taking levofloxacin. Tendon problems may be more likely to occur if you are over 60, if you take steroid medication, or if you have had a kidney, heart, or lung transplant. Do not give this medicine to a child without medical advice. Tendon and joint problems may be more likely in a child taking levofloxacin. It is not known whether this medicine will harm an unborn baby. Tell your doctor if you are pregnant or plan to become pregnant. Levofloxacin can pass into breast milk and may harm a nursing baby. You should not breast-feed while using this medicine. How should I take levofloxacin? Follow all directions on your prescription label. Do not take this medicine in larger or smaller amounts or for longer than recommended. Take levofloxacin with water, at the same time each day. Drink extra fluids to keep your kidneys working properly while taking this medicine. You may take levofloxacin tablets with or without food. Take levofloxacin oral solution (liquid)  on an empty stomach, at least 1 hour before or 2 hours after a meal.  Measure liquid medicine with the dosing syringe provided, or with a special dose-measuring spoon or medicine cup. If you do not have a dose-measuring device, ask your pharmacist for one. Use this medicine for the full prescribed length of time. Your symptoms may improve before the infection is completely cleared. Skipping doses may also increase your risk of further infection that is resistant to antibiotics. Levofloxacin will not treat a viral infection such as the flu or a common cold. Do not share this medication with another person (especially a child), even if they have the same symptoms you have.   This medication can cause you to have a false positive drug screening test. If you provide a urine sample for drug screening, tell the laboratory staff that you are taking levofloxacin. Store at room temperature away from moisture and heat. Keep the bottle tightly closed when not in use. What happens if I miss a dose? Take the missed dose as soon as you remember. Skip the missed dose if it is almost time for your next scheduled dose. Do not take extra medicine to make up the missed dose. What happens if I overdose? Seek emergency medical attention or call the Poison Help line at 1-169.652.4972. What should I avoid while taking levofloxacin? Certain other medicines should not be taken at the same time as levofloxacin. Avoid taking the following medicines within 2 hours before or after you take levofloxacin. These other medicines can make levofloxacin much less effective when taken at the same time:  · antacids that contain magnesium or aluminum (such as Maalox, Mylanta, or Rolaids), or the ulcer medicine sucralfate (Carafate);  · didanosine (Videx) powder or chewable tablets; or  · vitamin or mineral supplements that contain aluminum, iron, magnesium, or zinc.  This medication may impair your thinking or reactions. Be careful if you drive or do anything that requires you to be alert. Antibiotic medicines can cause diarrhea, which may be a sign of a new infection. If you have diarrhea that is watery or bloody, stop taking levofloxacin and call your doctor. Do not use anti-diarrhea medicine unless your doctor tells you to. Avoid exposure to sunlight or tanning beds. Levofloxacin can make you sunburn more easily. Wear protective clothing and use sunscreen (SPF 30 or higher) when you are outdoors. Call your doctor if you have severe burning, redness, itching, rash, or swelling after being in the sun. What are the possible side effects of levofloxacin?   Get emergency medical help if you have signs of an allergic reaction: hives, or the first sign of a skin rash; rapid heart rate, difficult breathing; swelling of your face, lips, drug combination in no way should be construed to indicate that the drug or drug combination is safe, effective or appropriate for any given patient. [facility] does not assume any responsibility for any aspect of healthcare administered with the aid of information [facility] provides. The information contained herein is not intended to cover all possible uses, directions, precautions, warnings, drug interactions, allergic reactions, or adverse effects. If you have questions about the drugs you are taking, check with your doctor, nurse or pharmacist.  Copyright 1675-7964 [address]: 11.01. Revision date: 7/5/2016. Care instructions adapted under license by [facility] ([facility]). If you have questions about a medical condition or this instruction, always ask your healthcare professional. [place] [address] any warranty or liability for your use of this information. Bronchitis: Care Instructions  Your Care Instructions    Bronchitis is inflammation of the bronchial tubes, which carry air to the lungs. The tubes swell and produce mucus, or phlegm. The mucus and inflamed bronchial tubes make you cough. You may have trouble breathing. Most cases of bronchitis are caused by viruses like those that cause colds. Antibiotics usually do not help and they may be harmful. Bronchitis usually develops rapidly and lasts about 2 to 3 weeks in otherwise healthy people. Follow-up care is a key part of your treatment and safety. Be sure to make and go to all appointments, and call your doctor if you are having problems. It's also a good idea to know your test results and keep a list of the medicines you take. How can you care for yourself at home? · Take all medicines exactly as prescribed. Call your doctor if you think you are having a problem with your medicine.   · Get some extra rest.  · Take an over-the-counter pain medicine, such as acetaminophen (Tylenol), ibuprofen (Advil, Motrin), or naproxen (Aleve) to reduce fever and relieve body aches. Read and follow all instructions on the label. · Do not take two or more pain medicines at the same time unless the doctor told you to. Many pain medicines have acetaminophen, which is Tylenol. Too much acetaminophen (Tylenol) can be harmful. · Take an over-the-counter cough medicine that contains dextromethorphan to help quiet a dry, hacking cough so that you can sleep. Avoid cough medicines that have more than one active ingredient. Read and follow all instructions on the label. · Breathe moist air from a humidifier, hot shower, or sink filled with hot water. The heat and moisture will thin mucus so you can cough it out. · Do not smoke. Smoking can make bronchitis worse. If you need help quitting, talk to your doctor about stop-smoking programs and medicines. These can increase your chances of quitting for good. When should you call for help? Call 911 anytime you think you may need emergency care. For example, call if:  · You have severe trouble breathing. Call your doctor now or seek immediate medical care if:  · You have new or worse trouble breathing. · You cough up dark brown or bloody mucus (sputum). · You have a new or higher fever. · You have a new rash. Watch closely for changes in your health, and be sure to contact your doctor if:  · You cough more deeply or more often, especially if you notice more mucus or a change in the color of your mucus. · You are not getting better as expected. Where can you learn more? Go to https://Omnisenssadie.GFG Group. org and sign in to your [employer] account. Enter H333 in the [employer] box to learn more about \"Bronchitis: Care Instructions. \"     If you do not have an account, please click on the \"Sign Up Now\" link. Current as of: March 25, 2017  Content Version: 11.3  © 6516-2628 [employer]ulon, Incorporated. Care instructions adapted under license by [facility] ([facility]).  If you have questions about a

## 2017-11-03 ENCOUNTER — HOSPITAL ENCOUNTER (OUTPATIENT)
Dept: GENERAL RADIOLOGY | Age: 37
Discharge: HOME | End: 2017-11-03
Payer: MEDICAID

## 2017-11-03 DIAGNOSIS — R05.9 COUGH: ICD-10-CM

## 2017-11-03 PROCEDURE — 71020 XR CHEST STANDARD TWO VW: CPT

## 2017-11-03 RX ORDER — DICYCLOMINE HYDROCHLORIDE 10 MG/1
10 CAPSULE ORAL 4 TIMES DAILY
Qty: 120 CAPSULE | Refills: 0 | Status: SHIPPED | OUTPATIENT
Start: 2017-11-03 | End: 2018-02-07

## 2017-11-03 RX ORDER — DICYCLOMINE HYDROCHLORIDE 10 MG/1
10 CAPSULE ORAL 4 TIMES DAILY
Qty: 120 CAPSULE | Refills: 0 | Status: CANCELLED | OUTPATIENT
Start: 2017-11-03

## 2017-11-03 NOTE — TELEPHONE ENCOUNTER
Tried to call pt to advise her that she does have pneumonia. Both her voicemails were full.  Could not leave a vm

## 2017-11-09 ENCOUNTER — HOSPITAL ENCOUNTER (OUTPATIENT)
Dept: GENERAL RADIOLOGY | Age: 37
Discharge: HOME | End: 2017-11-09
Payer: MEDICAID

## 2017-11-09 ENCOUNTER — OFFICE VISIT (OUTPATIENT)
Dept: INTERNAL MEDICINE | Age: 37
End: 2017-11-09
Payer: MEDICAID

## 2017-11-09 VITALS
HEART RATE: 63 BPM | WEIGHT: 135.5 LBS | HEIGHT: 63 IN | DIASTOLIC BLOOD PRESSURE: 78 MMHG | SYSTOLIC BLOOD PRESSURE: 106 MMHG | OXYGEN SATURATION: 98 % | BODY MASS INDEX: 24.01 KG/M2 | TEMPERATURE: 96.3 F

## 2017-11-09 DIAGNOSIS — J18.9 PNEUMONIA OF LEFT LOWER LOBE DUE TO INFECTIOUS ORGANISM: ICD-10-CM

## 2017-11-09 DIAGNOSIS — J18.9 PNEUMONIA OF LEFT LOWER LOBE DUE TO INFECTIOUS ORGANISM: Primary | ICD-10-CM

## 2017-11-09 PROCEDURE — 71020 XR CHEST STANDARD TWO VW: CPT

## 2017-11-09 PROCEDURE — 99213 OFFICE O/P EST LOW 20 MIN: CPT | Performed by: INTERNAL MEDICINE

## 2017-11-09 RX ORDER — LEVOFLOXACIN 500 MG/1
500 TABLET, FILM COATED ORAL DAILY
Qty: 5 TABLET | Refills: 0 | Status: SHIPPED | OUTPATIENT
Start: 2017-11-09 | End: 2017-11-14

## 2017-11-09 RX ORDER — GUAIFENESIN 600 MG/1
600 TABLET, EXTENDED RELEASE ORAL 2 TIMES DAILY
Qty: 10 TABLET | Refills: 0 | Status: SHIPPED | OUTPATIENT
Start: 2017-11-09 | End: 2017-12-05

## 2017-11-09 RX ORDER — ERGOCALCIFEROL 1.25 MG/1
CAPSULE ORAL
Qty: 8 CAPSULE | Refills: 2 | Status: SHIPPED | OUTPATIENT
Start: 2017-11-09 | End: 2018-06-30 | Stop reason: SDUPTHER

## 2017-11-09 RX ORDER — LEVOFLOXACIN 500 MG/1
500 TABLET, FILM COATED ORAL DAILY
Qty: 5 TABLET | Refills: 0 | Status: SHIPPED | OUTPATIENT
Start: 2017-11-09 | End: 2017-11-09 | Stop reason: SDUPTHER

## 2017-11-09 RX ORDER — ERGOCALCIFEROL 1.25 MG/1
CAPSULE ORAL
Qty: 8 CAPSULE | Refills: 2 | Status: SHIPPED | OUTPATIENT
Start: 2017-11-09 | End: 2017-11-09 | Stop reason: SDUPTHER

## 2017-11-09 RX ORDER — GUAIFENESIN 600 MG/1
600 TABLET, EXTENDED RELEASE ORAL 2 TIMES DAILY
Qty: 10 TABLET | Refills: 0 | Status: SHIPPED | OUTPATIENT
Start: 2017-11-09 | End: 2017-11-09 | Stop reason: SDUPTHER

## 2017-11-09 ASSESSMENT — ENCOUNTER SYMPTOMS
CHEST TIGHTNESS: 0
NAUSEA: 0
TROUBLE SWALLOWING: 0
COLOR CHANGE: 0
WHEEZING: 0
CHOKING: 0
SINUS PRESSURE: 1
RHINORRHEA: 0
SINUS PAIN: 1
SORE THROAT: 0
EYE ITCHING: 0
SHORTNESS OF BREATH: 1
EYE REDNESS: 0
SINUS PAIN: 0
WHEEZING: 0
ABDOMINAL DISTENTION: 0
VOICE CHANGE: 0
VOMITING: 0
VOMITING: 0
EYE REDNESS: 0
EYE PAIN: 0
PHOTOPHOBIA: 0
EYE DISCHARGE: 0
ABDOMINAL DISTENTION: 0
EYE DISCHARGE: 0
COLOR CHANGE: 0
DIARRHEA: 0
COUGH: 1
BLOOD IN STOOL: 0
ABDOMINAL PAIN: 0
SORE THROAT: 1
BACK PAIN: 0
CONSTIPATION: 0
CHEST TIGHTNESS: 0
EYE PAIN: 0
PHOTOPHOBIA: 0
COUGH: 1
RHINORRHEA: 1
SHORTNESS OF BREATH: 0
EYE ITCHING: 0
ABDOMINAL PAIN: 0
SINUS PRESSURE: 0

## 2017-11-09 NOTE — PATIENT INSTRUCTIONS
around you. Smoke will make your cough last longer. If you need help quitting, talk to your doctor about stop-smoking programs and medicines. These can increase your chances of quitting for good. · Take an over-the-counter pain medicine, such as acetaminophen (Tylenol), ibuprofen (Advil, Motrin), or naproxen (Aleve). Read and follow all instructions on the label. · Do not take two or more pain medicines at the same time unless the doctor told you to. Many pain medicines have acetaminophen, which is Tylenol. Too much acetaminophen (Tylenol) can be harmful. · If you were given a spirometer to measure how well your lungs are working, use it as instructed. This can help your doctor tell how your recovery is going. · To prevent pneumonia in the future, talk to your doctor about getting a flu vaccine (once a year) and a pneumococcal vaccine (one time only for most people). When should you call for help? Call 911 anytime you think you may need emergency care. For example, call if:  · You have severe trouble breathing. Call your doctor now or seek immediate medical care if:  · You cough up dark brown or bloody mucus (sputum). · You have new or worse trouble breathing. · You are dizzy or lightheaded, or you feel like you may faint. Watch closely for changes in your health, and be sure to contact your doctor if:  · You have a new or higher fever. · You are coughing more deeply or more often. · You are not getting better after 2 days (48 hours). · You do not get better as expected. Where can you learn more? Go to https://CelenomarinaAdHack.Akros [email or web address] org and sign in to your Geofusion account. Enter D336 in the [facility] box to learn more about \"Pneumonia: Care Instructions. \"     If you do not have an account, please click on the \"Sign Up Now\" link. Current as of: March 25, 2017  Content Version: 11.3  © 1055-2911 [employer]Joy [employer], Incorporated. Care instructions adapted under license by [facility] ([facility]).  If you have questions about a medical condition or this instruction, always ask your healthcare professional. [place] [address] any warranty or liability for your use of this information.

## 2017-11-09 NOTE — PROGRESS NOTES
Chief Complaint   Patient presents with    Cough     Patient here to follow up on bronchitis. Patient states that she is still cough, episodes of chest pain, tired and week. HPI: Catina Santiago is a 44 y.o. female is here for Left lower lobe pneumonia. He was seen in our office last week for cough and congestion. She was found to have left lower lymph node lobe pneumonia per chest x-ray. We did start her on Levaquin. She states that she feels a little bit better, but she still tired and weak. She is coughing up some yellowish sputum. She denies he complains of fevers or cheer chills. She tires out easily. She still coughs quite a bit. She denies a complaints of shortness of breath or chest pain. She was treated with a round of Levaquin. She states that she is not feeling a whole lot better, but she is feeling somewhat better.     Past Medical History:   Diagnosis Date    Fibromyalgia     Fluid retention     H/O Raynaud's syndrome     Heartburn     Labial cyst     sebaceous cyst and skin tag    Spastic colon     Vitamin D deficiency       Past Surgical History:   Procedure Laterality Date    APPENDECTOMY      CHOLECYSTECTOMY      COLONOSCOPY  2010    OVARY REMOVAL      left    [place] LYSIS OF LABIAL ADHESIONS N/A 2/16/2017    INCISION AND DRAINAGE OF SEBACEOUS CYST ON LABIA; SKIN TAG REMOVAL OF PERINEUM  performed by Dre Gipson MD at [place] [address] TYMPANOSTOMY TUBE PLACEMENT      UPPER GASTROINTESTINAL ENDOSCOPY      VULVA SURGERY N/A 2/16/2017    LABIALPLASTY  performed by Dre Gipson MD at [facility] OR      Social History     Social History    Marital status:      Spouse name: N/A    Number of children: N/A    Years of education: N/A     Social History Main Topics    Smoking status: Current Every Day Smoker     Packs/day: 0.50     Years: 10.00     Types: Cigarettes    Smokeless tobacco: Never Used      Comment: Since sick - cut back a lot 2 packs in two weeks (PEPCID) 20 MG tablet Take 20 mg by mouth 2 times daily as needed. No current facility-administered medications for this visit. Patient Active Problem List   Diagnosis    Ovarian cyst    Dyspareunia, female    Labial hypertrophy    Vaginal inclusion cyst    Vulvar skin tag        Review of Systems   Constitutional: Positive for fatigue. Negative for activity change, appetite change, chills, diaphoresis, fever and unexpected weight change. HENT: Negative for congestion, ear discharge, nosebleeds, postnasal drip, rhinorrhea, sinus pain, sinus pressure, sneezing, sore throat, tinnitus, trouble swallowing and voice change. Eyes: Negative for photophobia, pain, discharge, redness, itching and visual disturbance. Respiratory: Positive for cough. Negative for choking, chest tightness, shortness of breath and wheezing. Cardiovascular: Negative for chest pain, palpitations and leg swelling. Gastrointestinal: Negative for abdominal distention, abdominal pain, constipation, diarrhea and vomiting. Endocrine: Negative for cold intolerance, heat intolerance, polydipsia, polyphagia and polyuria. Genitourinary: Positive for genital sores. Negative for difficulty urinating, dysuria, flank pain, frequency and urgency. Musculoskeletal: Negative for arthralgias, back pain, gait problem, joint swelling, myalgias, neck pain and neck stiffness. Skin: Negative for color change, pallor, rash and wound. Allergic/Immunologic: Negative for environmental allergies, food allergies and immunocompromised state. Neurological: Negative for dizziness, tremors, seizures, syncope, facial asymmetry, speech difficulty, weakness, light-headedness, numbness and headaches. Hematological: Negative for adenopathy. Does not bruise/bleed easily. Psychiatric/Behavioral: Positive for dysphoric mood.  Negative for agitation, behavioral problems, confusion, decreased concentration, hallucinations, self-injury, sleep

## 2017-11-10 ENCOUNTER — TELEPHONE (OUTPATIENT)
Dept: INTERNAL MEDICINE | Age: 37
End: 2017-11-10

## 2017-11-10 NOTE — PROGRESS NOTES
Chief Complaint   Patient presents with    6 Month Follow-Up     Still sick - needs stronger meds.  Shortness of Breath    Fatigue       HPI: Aimee Carballo is a 44 y.o. female is here for followup of fibromyalgia. She reports that she has been sick for the past two weeks. Her symptoms started with a sore throat on the right side. She was treated with Amoxicillin, Steroid injections, Mucinex, Steroid injections, and inhalers. Flu and strept swabs were negative. She is not feeling any better. She has a deep, productive cough. She is weak from coughing. She is trying to eat and drink. She has lots of drainage. She was told per Urgent Care that she had an ear infection and URI. She has not had any fever or chills. She just does not feel well. She is not short of breath. She feels that her fibromyalgia is relatively stable. She is under a lot of stress. She was laid off from her job and is currently looking for work.          Past Medical History:   Diagnosis Date    Fibromyalgia     Fluid retention     H/O Raynaud's syndrome     Heartburn     Labial cyst     sebaceous cyst and skin tag    Spastic colon     Vitamin D deficiency       Past Surgical History:   Procedure Laterality Date    APPENDECTOMY      CHOLECYSTECTOMY      COLONOSCOPY  2010    OVARY REMOVAL      left    HI LYSIS OF LABIAL ADHESIONS N/A 2/16/2017    INCISION AND DRAINAGE OF SEBACEOUS CYST ON LABIA; SKIN TAG REMOVAL OF PERINEUM  performed by Helene Virgen MD at [place] [address] TYMPANOSTOMY TUBE PLACEMENT      UPPER GASTROINTESTINAL ENDOSCOPY      VULVA SURGERY N/A 2/16/2017    LABIALPLASTY  performed by Helene Virgen MD at [address] [place] [address] History     Social History    Marital status:      Spouse name: N/A    Number of children: N/A    Years of education: N/A     Social History Main Topics    Smoking status: Current Every Day Smoker     Packs/day: 0.50     Years: 10.00     Types: Cigarettes  Smokeless tobacco: Never Used      Comment: Since sick - cut back a lot 2 packs in two weeks    Alcohol use 0.6 oz/week     1 Shots of liquor per week      Comment: 2 per month    Drug use: No    Sexual activity: Not Currently     Partners: Male     Other Topics Concern    Not on file     Social History Narrative    No narrative on file      Family History   Problem Relation Age of Onset    Breast Cancer Mother 61    Cancer Maternal Aunt      breast unsure of age   Noa Stack Cancer Paternal [de-identified]      breast unsure of age   Noa Stack Stroke Maternal Grandmother     Stroke Maternal Grandfather     Cancer Paternal Aunt      breast unsure of age        Current Outpatient Prescriptions   Medication Sig Dispense Refill    guaiFENesin-codeine (CHERATUSSIN AC) 100-10 MG/5ML syrup Take 5 mLs by mouth every 4 hours as needed for Cough 1 Bottle 0    DULoxetine (CYMBALTA) 60 MG extended release capsule Take 60 mg by mouth daily      busPIRone (BUSPAR) 5 MG tablet Take 2.5 mg by mouth 2 times daily      albuterol sulfate HFA (PROVENTIL HFA) 108 (90 Base) MCG/ACT inhaler Inhale 2 puffs into the lungs every 6 hours as needed for Wheezing 1 Inhaler 0    clobetasol (TEMOVATE) 0.05 % ointment Apply 1 each topically 2 times daily as needed (.) 1 Tube 5    estradiol (ESTRACE VAGINAL) 0.1 MG/GM vaginal cream Place 1 g vaginally Twice a Week 1 Tube 5    furosemide (LASIX) 20 MG tablet Take 20 mg by mouth daily       topiramate (TOPAMAX) 50 MG tablet TK 2 T PO QHS  2    rizatriptan (MAXALT) 5 MG tablet Take 5 mg by mouth once as needed for Migraine       tiZANidine (ZANAFLEX) 4 MG tablet Take 2 mg by mouth 2 times daily Indications: Fibromyalgia Syndrome   2    famotidine (PEPCID) 20 MG tablet Take 20 mg by mouth 2 times daily as needed.       levofloxacin (LEVAQUIN) 500 MG tablet Take 1 tablet by mouth daily for 5 doses 5 tablet 0    vitamin D (ERGOCALCIFEROL) 37711 units CAPS capsule Pt to take one weekly 8 capsule 2    guaiFENesin (MUCINEX) 600 MG extended release tablet Take 1 tablet by mouth 2 times daily 10 tablet 0    dicyclomine (BENTYL) 10 MG capsule Take 1 capsule by mouth 4 times daily 120 capsule 0     No current facility-administered medications for this visit. Patient Active Problem List   Diagnosis    Ovarian cyst    Dyspareunia, female    Labial hypertrophy    Vaginal inclusion cyst    Vulvar skin tag        Review of Systems   Constitutional: Positive for fatigue. Negative for chills and fever. Does not feel well   HENT: Positive for congestion, ear pain, postnasal drip, rhinorrhea, sinus pain, sinus pressure and sore throat. Eyes: Negative for photophobia, pain, discharge, redness, itching and visual disturbance. Respiratory: Positive for cough and shortness of breath. Negative for chest tightness and wheezing. Cardiovascular: Negative for chest pain, palpitations and leg swelling. Gastrointestinal: Negative for abdominal distention, abdominal pain, blood in stool, nausea and vomiting. Endocrine: Negative for cold intolerance, heat intolerance, polydipsia, polyphagia and polyuria. Genitourinary: Negative for difficulty urinating, dysuria, frequency and urgency. Musculoskeletal: Positive for arthralgias and myalgias. Generalized arthralgias and myalgias   Skin: Negative for color change, pallor, rash and wound. Allergic/Immunologic: Negative for environmental allergies, food allergies and immunocompromised state. Neurological: Negative for dizziness, tremors, seizures, syncope, facial asymmetry, speech difficulty, weakness, light-headedness, numbness and headaches. Hematological: Negative for adenopathy. Does not bruise/bleed easily. Psychiatric/Behavioral: Positive for dysphoric mood. Negative for confusion. The patient is nervous/anxious.         /60   Pulse 89   Ht 5' 3\" (1.6 m)   Wt 135 lb 8 oz (61.5 kg)   SpO2 97%   BMI 24.00 kg/m²   Physical Exam Constitutional: She is oriented to person, place, and time. She appears well-developed and well-nourished. No distress. HENT:   Head: Normocephalic and atraumatic. Right Ear: External ear normal.   Left Ear: External ear normal.   Nose: Nose normal.   Mouth/Throat: Oropharyngeal exudate present. Oropharynx is slightly red with postnasal drip. Mild sinus pressure and pain noted   Eyes: Conjunctivae and EOM are normal. Pupils are equal, round, and reactive to light. Right eye exhibits no discharge. Left eye exhibits no discharge. No scleral icterus. Neck: Normal range of motion. Neck supple. No JVD present. No tracheal deviation present. No thyromegaly present. Cardiovascular: Normal rate, regular rhythm, normal heart sounds and intact distal pulses. Exam reveals no gallop and no friction rub. No murmur heard. Pulmonary/Chest: Effort normal. She has rales. She exhibits no tenderness. She has rhonchi in the left lower lobe   Abdominal: Soft. Bowel sounds are normal. She exhibits no distension. There is no tenderness. There is no rebound and no guarding. Musculoskeletal: Normal range of motion. She exhibits tenderness. Touching trigger points illicits tenderness   Lymphadenopathy:     She has no cervical adenopathy. Neurological: She is alert and oriented to person, place, and time. She has normal reflexes. No cranial nerve deficit. Coordination normal.   Skin: Skin is warm and dry. She is not diaphoretic. Psychiatric:   Affect a little flat   Nursing note and vitals reviewed. 1. Cough        ASSESSMENT/PLAN:    44year old woman here for followup of fibromyalgia    1) Cough and chest congestion: Concerning for pneumonia. CXR ordered. Cheratussin cough syrup. Levaquin and Prednisone Dose pack prescribed    2) Fibromyalgia: Stable  Daija was seen today for 6 month follow-up, shortness of breath and fatigue.     Diagnoses and all orders for this visit:    Cough  -     XR CHEST STANDARD (2 VW); Future    Other orders  -     Discontinue: dicyclomine (BENTYL) 10 MG capsule; Take 1 capsule by mouth daily as needed (abdominal pain)  -     Discontinue: levofloxacin (LEVAQUIN) 500 MG tablet; Take 1 tablet by mouth daily for 10 days  -     guaiFENesin-codeine (CHERATUSSIN AC) 100-10 MG/5ML syrup; Take 5 mLs by mouth every 4 hours as needed for Cough  -     Discontinue: PredniSONE 5 MG (21) TBPK; Take 1 each by mouth See Admin Instructions  -     fluconazole (DIFLUCAN) 100 MG tablet; Take 1 tablet by mouth daily for 3 doses  -     Discontinue: levofloxacin (LEVAQUIN) 500 MG tablet; Take 1 tablet by mouth daily for 7 doses  -     Discontinue: PredniSONE 5 MG (21) TBPK; Take 1 each by mouth See Admin Instructions          Return in about 1 week (around 11/9/2017), or follow up bronchitis.      Orders Placed This Encounter   Procedures    XR CHEST STANDARD (2 VW)     Standing Status:   Future     Number of Occurrences:   1     Standing Expiration Date:   11/2/2018     Order Specific Question:   Reason for exam:     Answer:   cough       Nancy Ott MD

## 2017-11-10 NOTE — TELEPHONE ENCOUNTER
[facility]  [address]  [place] [address]  Phone: [phone]  Fax: [phone]                  Mitch Whittaker   3/6/2017 9:40 AM   Office Visit    Description:  Male : 1954   Provider:  Sal Gallagher IV, MD   Department:  [facility]           Reason for Visit     Other           Diagnoses this Visit        Comments    Benign prostatic hyperplasia, presence of lower urinary tract symptoms unspecified, unspecified morphology    -  Primary            To Do List           Future Appointments        Provider Department Dept Phone    3/27/2017 10:00 AM BETTY Zuniga MD [facility] - Ophthalmology [phone]    2017 8:30 AM LABORATORY, [facility] Laboratory [phone]    3/6/2018 9:30 AM LABORATORY, [address] [facility] [phone]    3/9/2018 8:20 AM Sal Gallagher IV, MD [facility] [phone]      Goals (5 Years of Data)     None      Follow-Up and Disposition     Return in about 1 year (around 3/6/2018).    Follow-up and Disposition History       These Medications        Disp Refills Start End    finasteride (PROSCAR) 5 mg tablet 30 tablet 11 3/6/2017 3/6/2018    Take 1 tablet (5 mg total) by mouth once daily. - Oral    Pharmacy: [facility] - [place], [place] - [address] Ph #: [phone]       tamsulosin (FLOMAX) 0.4 mg Cp24 30 capsule 11 3/6/2017 3/6/2018    Take 1 capsule (0.4 mg total) by mouth once daily. - Oral    Pharmacy: [facility] - [place], [place] - [address] Ph #: [phone]         [facility]     [facility] Nurse Care Line -  Assistance  Registered nurses in the [facility] provide clinical advisement, health education, appointment booking, and other advisory services.  Call for this free service at [phone].             Medications           Message regarding Medications     Verify the  Pt called. Advised of her cxr- pneumonia is improving. Repeat cxr in 10 days. Stated she has a follow up next week. Will discuss with pcp then. changes and/or additions to your medication regime listed below are the same as discussed with your clinician today.  If any of these changes or additions are incorrect, please notify your healthcare provider.        START taking these NEW medications        Refills    finasteride (PROSCAR) 5 mg tablet 11    Sig: Take 1 tablet (5 mg total) by mouth once daily.    Class: Normal    Route: Oral           Verify that the below list of medications is an accurate representation of the medications you are currently taking.  If none reported, the list may be blank. If incorrect, please contact your healthcare provider. Carry this list with you in case of emergency.           Current Medications     amlodipine (NORVASC) 2.5 MG tablet Take 1 tablet (2.5 mg total) by mouth once daily.    aspirin (ECOTRIN) 81 MG EC tablet Take 1 tablet by mouth Daily.     bisacodyl (DULCOLAX) 5 mg EC tablet Take 5 mg by mouth daily as needed for Constipation.    blood sugar diagnostic Strp 1 strip by Misc.(Non-Drug; Combo Route) route as directed. Use to check BG 2-3 times daily. Contour next strips    ergocalciferol (VITAMIN D2) 50,000 unit Cap TAKE 1 CAPSULE BY MOUTH EVERY 7 DAYS.    famotidine (PEPCID) 20 MG tablet Take 1 tablet (20 mg total) by mouth every evening.    glimepiride (AMARYL) 2 MG tablet Take 1 tablet (2 mg total) by mouth before breakfast.    insulin aspart (NOVOLOG FLEXPEN) 100 unit/mL InPn pen 15 plus: 180-230=2, 231-280=4, 281-330=6, 331-380=8, >380=10 with each meal Min15- 20 units TID    insulin glargine (LANTUS SOLOSTAR) 100 unit/mL (3 mL) InPn pen Inject 30 Units into the skin 2 (two) times daily.    ketoconazole (NIZORAL) 200 mg Tab Take 0.5 tablets (100 mg total) by mouth once daily.    lancets 33 gauge Misc 1 Stick by Misc.(Non-Drug; Combo Route) route as directed. Contour lancets. Use to check BG 2-3 times daily.    magnesium oxide (MAG-OX) 400 mg tablet Take 2 tablets (800 mg total) by mouth 2 (two) times daily.     "metoprolol succinate (TOPROL-XL) 25 MG 24 hr tablet Take 2 tablets (50 mg total) by mouth once daily.    multivitamin (THERAGRAN) tablet Take 1 tablet by mouth once daily.    mycophenolate (CELLCEPT) 250 mg Cap Take 3 capsules (750 mg total) by mouth 2 (two) times daily.    omega-3 fatty acids-vitamin E (FISH OIL) 1,000 mg Cap Take 1 capsule by mouth once daily.     pen needle, diabetic (BD INSULIN PEN NEEDLE UF SHORT) 31 gauge x 5/16" Ndle USE TO INJECT INSULIN TWICE A DAY    predniSONE (DELTASONE) 5 MG tablet Take 1 tablet (5 mg total) by mouth once daily. Z94.0    rosuvastatin (CRESTOR) 40 MG Tab Take 1 tablet (40 mg total) by mouth every evening.    tacrolimus (PROGRAF) 1 MG Cap 5mg in AM 4mg in PMKIDNEY TRANSPLANT 6-12-15 z94.0    tamsulosin (FLOMAX) 0.4 mg Cp24 Take 1 capsule (0.4 mg total) by mouth once daily.    finasteride (PROSCAR) 5 mg tablet Take 1 tablet (5 mg total) by mouth once daily.           Clinical Reference Information           Your Vitals Were     Height Weight BMI          5' 7" (1.702 m) 123.1 kg (271 lb 4.4 oz) 42.49 kg/m2        Allergies as of 3/6/2017     Lisinopril    Actos  [Pioglitazone]    Metformin      Immunizations Administered on Date of Encounter - 3/6/2017     None      Orders Placed During Today's Visit     Future Labs/Procedures Expected by Expires    PSA, Screening  3/6/2018 5/5/2018      Maintenance Dialysis History     Start End Type Comments Center    7/9/2013  [place]  [facility]            Current Dialysis Center Information     [facility] [address] Phone #:  [phone]    Contact:  N/A DAVID CH[place]VEZ  [address] Fax #:  [phone]            Transplant Information        Txp Date Organ Coordinator Care Team    6/12/2015 Kidney Sean Lobato RN Referring Physician:  Bucky Danielle MD   Current Nephrologist:  Bucky Danielle MD   Surgeon:  Carlota Connors MD   Assisting Surgeon:  Magda Pereira MD         Language Assistance Services     " ATTENTION: Language assistance services are available, free of charge. Please call [phone].      ATENCIÓN: Si habla antoineañol, tiene a schumacher disposición servicios gratuitos de asistencia lingüística. Llame al [phone].     CHÚ Ý: N?u b?n nói Ti?ng Vi?t, có các d?ch v? h? tr? ngôn ng? mi?n phí dành cho b?n. G?i s? [phone].         [facility] complies with applicable Federal civil rights laws and does not discriminate on the basis of race, color, national origin, age, disability, or sex.

## 2017-11-16 ENCOUNTER — OFFICE VISIT (OUTPATIENT)
Dept: INTERNAL MEDICINE | Age: 37
End: 2017-11-16
Payer: MEDICAID

## 2017-11-16 ENCOUNTER — HOSPITAL ENCOUNTER (OUTPATIENT)
Dept: GENERAL RADIOLOGY | Age: 37
Discharge: HOME | End: 2017-11-16
Payer: MEDICAID

## 2017-11-16 VITALS
OXYGEN SATURATION: 98 % | SYSTOLIC BLOOD PRESSURE: 118 MMHG | DIASTOLIC BLOOD PRESSURE: 70 MMHG | WEIGHT: 137 LBS | HEART RATE: 79 BPM | BODY MASS INDEX: 24.27 KG/M2 | HEIGHT: 63 IN

## 2017-11-16 DIAGNOSIS — J18.9 PNEUMONIA OF LEFT LOWER LOBE DUE TO INFECTIOUS ORGANISM: ICD-10-CM

## 2017-11-16 DIAGNOSIS — J18.9 PNEUMONIA OF LEFT LOWER LOBE DUE TO INFECTIOUS ORGANISM: Primary | ICD-10-CM

## 2017-11-16 PROCEDURE — 99213 OFFICE O/P EST LOW 20 MIN: CPT | Performed by: INTERNAL MEDICINE

## 2017-11-16 PROCEDURE — 71020 XR CHEST STANDARD TWO VW: CPT

## 2017-11-16 ASSESSMENT — ENCOUNTER SYMPTOMS
COLOR CHANGE: 0
WHEEZING: 0
BACK PAIN: 0
PHOTOPHOBIA: 0
ABDOMINAL DISTENTION: 0
CONSTIPATION: 0
SINUS PAIN: 0
VOMITING: 0
EYE PAIN: 0
VOICE CHANGE: 0
TROUBLE SWALLOWING: 0
COUGH: 1
SHORTNESS OF BREATH: 0
DIARRHEA: 0
ABDOMINAL PAIN: 0
CHOKING: 0
RHINORRHEA: 0
EYE REDNESS: 0
SINUS PRESSURE: 0
SORE THROAT: 0
EYE DISCHARGE: 0
CHEST TIGHTNESS: 0
EYE ITCHING: 0

## 2017-11-16 NOTE — PROGRESS NOTES
Family History   Problem Relation Age of Onset    Breast Cancer Mother 61    Cancer Maternal Aunt      breast unsure of age   Claudia Gamino Cancer Paternal [de-identified]      breast unsure of age   Claudia Gamino Stroke Maternal Grandmother     Stroke Maternal Grandfather     Cancer Paternal Aunt      breast unsure of age        Current Outpatient Prescriptions   Medication Sig Dispense Refill    vitamin D (ERGOCALCIFEROL) 29506 units CAPS capsule Pt to take one weekly 8 capsule 2    guaiFENesin (MUCINEX) 600 MG extended release tablet Take 1 tablet by mouth 2 times daily 10 tablet 0    dicyclomine (BENTYL) 10 MG capsule Take 1 capsule by mouth 4 times daily 120 capsule 0    DULoxetine (CYMBALTA) 60 MG extended release capsule Take 60 mg by mouth daily      busPIRone (BUSPAR) 5 MG tablet Take 2.5 mg by mouth 2 times daily      albuterol sulfate HFA (PROVENTIL HFA) 108 (90 Base) MCG/ACT inhaler Inhale 2 puffs into the lungs every 6 hours as needed for Wheezing 1 Inhaler 0    clobetasol (TEMOVATE) 0.05 % ointment Apply 1 each topically 2 times daily as needed (.) 1 Tube 5    estradiol (ESTRACE VAGINAL) 0.1 MG/GM vaginal cream Place 1 g vaginally Twice a Week 1 Tube 5    furosemide (LASIX) 20 MG tablet Take 20 mg by mouth daily       topiramate (TOPAMAX) 50 MG tablet TK 2 T PO QHS  2    rizatriptan (MAXALT) 5 MG tablet Take 5 mg by mouth once as needed for Migraine       tiZANidine (ZANAFLEX) 4 MG tablet Take 2 mg by mouth 2 times daily Indications: Fibromyalgia Syndrome   2    famotidine (PEPCID) 20 MG tablet Take 20 mg by mouth 2 times daily as needed. No current facility-administered medications for this visit. Patient Active Problem List   Diagnosis    Ovarian cyst    Dyspareunia, female    Labial hypertrophy    Vaginal inclusion cyst    Vulvar skin tag        Review of Systems   Constitutional: Positive for fatigue.  Negative for activity change, appetite change, chills, diaphoresis, fever and unexpected weight change. HENT: Negative for congestion, ear discharge, nosebleeds, postnasal drip, rhinorrhea, sinus pain, sinus pressure, sneezing, sore throat, tinnitus, trouble swallowing and voice change. Eyes: Negative for photophobia, pain, discharge, redness, itching and visual disturbance. Respiratory: Positive for cough. Negative for choking, chest tightness, shortness of breath and wheezing. Cardiovascular: Negative for chest pain, palpitations and leg swelling. Gastrointestinal: Negative for abdominal distention, abdominal pain, constipation, diarrhea and vomiting. Endocrine: Negative for cold intolerance, heat intolerance, polydipsia, polyphagia and polyuria. Genitourinary: Negative for difficulty urinating, dysuria, flank pain, frequency, genital sores and urgency. Musculoskeletal: Negative for arthralgias, back pain, gait problem, joint swelling, myalgias, neck pain and neck stiffness. Skin: Negative for color change, pallor, rash and wound. Allergic/Immunologic: Negative for environmental allergies, food allergies and immunocompromised state. Neurological: Negative for dizziness, tremors, seizures, syncope, facial asymmetry, speech difficulty, weakness, light-headedness, numbness and headaches. Hematological: Negative for adenopathy. Does not bruise/bleed easily. Psychiatric/Behavioral: Negative for agitation, behavioral problems, confusion, decreased concentration, dysphoric mood, hallucinations, self-injury, sleep disturbance and suicidal ideas. The patient is not nervous/anxious and is not hyperactive. She does report some stress and anxiety. /70   Pulse 79   Ht 5' 3\" (1.6 m)   Wt 137 lb (62.1 kg)   SpO2 98%   BMI 24.27 kg/m²   Physical Exam   Constitutional: She is oriented to person, place, and time. She appears well-developed and well-nourished. No distress. HENT:   Head: Normocephalic and atraumatic.    Right Ear: External ear normal.   Left Ear: External

## 2017-11-16 NOTE — PATIENT INSTRUCTIONS
Patient Education        Pneumocystis Pneumonia and AIDS: Care Instructions  Your Care Instructions  Pneumocystis is a fungus that can sometimes cause pneumonia in people who have AIDS. Pneumonia is an infection of the lungs. Pneumonia caused by pneumocystis can make it hard to breathe and to get enough oxygen into the blood. Pneumocystis pneumonia, or PCP, is the most common serious infection in people with AIDS. PCP can be prevented with medicine. If you get PCP, it can be treated. Antibiotics can get rid of the infection. You should have your blood tested regularly to check the strength of your immune system and to help your doctor decide if you need to take drugs to prevent PCP. Follow-up care is a key part of your treatment and safety. Be sure to make and go to all appointments, and call your doctor if you are having problems. It's also a good idea to know your test results and keep a list of the medicines you take. How can you care for yourself at home? · Take your antibiotics as directed. Do not stop taking them just because you feel better. You need to take the full course of antibiotics. · Take all your medicines exactly as prescribed. Call your doctor if you have any problems with your medicine. · If you are taking IV medicine at home, follow your doctor's instructions. · Get plenty of rest and sleep. You may feel weak and tired for a while, but your energy level will improve with time. · Drink plenty of fluids, enough so that your urine is light yellow or clear like water. Choose water and other caffeine-free clear liquids until you feel better. If you have kidney, heart, or liver disease and have to limit fluids, talk with your doctor before you increase the amount of fluids you drink. · Take care of your cough so you can rest. A cough that brings up mucus from your lungs is common with pneumonia. It is one way your body gets rid of the infection.  But if coughing keeps you from resting or causes severe fatigue and chest-wall pain, talk to your doctor. He or she may suggest that you take a medicine to reduce the cough. · Use a humidifier to increase the moisture in the air. Dry air makes coughing worse. Follow the instructions for cleaning the machine. · Do not smoke or allow others to smoke around you. If you need help quitting, talk to your doctor about stop-smoking programs and medicines. These can increase your chances of quitting for good. When should you call for help? Call 911 anytime you think you may need emergency care. For example, call if:  · You have severe trouble breathing, or your skin or nails look gray or blue. Call your doctor now or seek immediate medical care if:  · You have chest pain that is made worse by coughing or deep breathing. · You get a rash or a high fever. · You have been vomiting and feel like you may faint when you sit up or stand. · You get a yeast infection in your mouth or throat (commonly called \"thrush\"). Symptoms of thrush may include:  ¨ White patches that look like cottage cheese or milk curds on the tongue or other places in the mouth. ¨ Red and raw-looking tissue around and under the white patches, which may bleed. ¨ Pain and difficulty swallowing. · Your shortness of breath is worse. · You have fatigue and feelings of weakness (malaise). Watch closely for changes in your health, and be sure to contact your doctor if:  · You have a slight fever that lasts for 2 weeks or longer. · You feel lightheaded or dizzy. · You feel like you are getting sicker. Where can you learn more? Go to https://[email or web address]. org and sign in to your [employer] account. Enter C019 in the [facility] box to learn more about \"Pneumocystis Pneumonia and AIDS: Care Instructions. \"     If you do not have an account, please click on the \"Sign Up Now\" link. Current as of: March 3, 2017  Content Version: 11.3  © 5204-6020 [email or web address], [facility].  Care instructions adapted under license by [facility] ([facility]). If you have questions about a medical condition or this instruction, always ask your healthcare professional. [address] any warranty or liability for your use of this information.

## 2017-11-17 DIAGNOSIS — J18.9 PNEUMONIA OF BOTH LUNGS DUE TO INFECTIOUS ORGANISM, UNSPECIFIED PART OF LUNG: Primary | ICD-10-CM

## 2017-11-20 DIAGNOSIS — J18.9 PNEUMONIA DUE TO INFECTIOUS ORGANISM, UNSPECIFIED LATERALITY, UNSPECIFIED PART OF LUNG: Primary | ICD-10-CM

## 2017-12-05 ENCOUNTER — OFFICE VISIT (OUTPATIENT)
Dept: INTERNAL MEDICINE | Age: 37
End: 2017-12-05
Payer: MEDICAID

## 2017-12-05 ENCOUNTER — HOSPITAL ENCOUNTER (OUTPATIENT)
Dept: GENERAL RADIOLOGY | Age: 37
Discharge: HOME | End: 2017-12-05
Payer: MEDICAID

## 2017-12-05 VITALS
BODY MASS INDEX: 24.63 KG/M2 | WEIGHT: 139 LBS | HEART RATE: 95 BPM | TEMPERATURE: 100 F | DIASTOLIC BLOOD PRESSURE: 80 MMHG | HEIGHT: 63 IN | OXYGEN SATURATION: 98 % | SYSTOLIC BLOOD PRESSURE: 122 MMHG

## 2017-12-05 DIAGNOSIS — J18.9 PNEUMONIA OF LEFT LUNG DUE TO INFECTIOUS ORGANISM, UNSPECIFIED PART OF LUNG: Primary | ICD-10-CM

## 2017-12-05 DIAGNOSIS — J18.9 PNEUMONIA OF LEFT LUNG DUE TO INFECTIOUS ORGANISM, UNSPECIFIED PART OF LUNG: ICD-10-CM

## 2017-12-05 DIAGNOSIS — R53.83 FATIGUE, UNSPECIFIED TYPE: ICD-10-CM

## 2017-12-05 LAB
ALBUMIN SERPL-MCNC: 4.4 G/DL (ref 3.5–5.2)
ALP BLD-CCNC: 89 U/L (ref 35–104)
ALT SERPL-CCNC: 19 U/L (ref 5–33)
ANION GAP SERPL CALCULATED.3IONS-SCNC: 13 MMOL/L (ref 7–19)
AST SERPL-CCNC: 14 U/L (ref 5–32)
BASOPHILS ABSOLUTE: 0.1 K/UL (ref 0–0.2)
BASOPHILS RELATIVE PERCENT: 0.5 % (ref 0–1)
BILIRUB SERPL-MCNC: <0.2 MG/DL (ref 0.2–1.2)
BILIRUBIN URINE: NEGATIVE
BLOOD, URINE: NEGATIVE
BUN BLDV-MCNC: 11 MG/DL (ref 6–20)
CALCIUM SERPL-MCNC: 9.5 MG/DL (ref 8.6–10)
CHLORIDE BLD-SCNC: 102 MMOL/L (ref 98–111)
CLARITY: ABNORMAL
CO2: 25 MMOL/L (ref 22–29)
COLOR: YELLOW
CREAT SERPL-MCNC: 0.6 MG/DL (ref 0.5–0.9)
EOSINOPHILS ABSOLUTE: 0.4 K/UL (ref 0–0.6)
EOSINOPHILS RELATIVE PERCENT: 3.1 % (ref 0–5)
GFR NON-AFRICAN AMERICAN: >60
GLUCOSE BLD-MCNC: 78 MG/DL (ref 74–109)
GLUCOSE URINE: NEGATIVE MG/DL
HCT VFR BLD CALC: 43.9 % (ref 37–47)
HEMOGLOBIN: 14.4 G/DL (ref 12–16)
KETONES, URINE: NEGATIVE MG/DL
LEUKOCYTE ESTERASE, URINE: NEGATIVE
LYMPHOCYTES ABSOLUTE: 3.3 K/UL (ref 1.1–4.5)
LYMPHOCYTES RELATIVE PERCENT: 29.1 % (ref 20–40)
MCH RBC QN AUTO: 32.6 PG (ref 27–31)
MCHC RBC AUTO-ENTMCNC: 32.8 G/DL (ref 33–37)
MCV RBC AUTO: 99.3 FL (ref 81–99)
MONOCYTES ABSOLUTE: 0.8 K/UL (ref 0–0.9)
MONOCYTES RELATIVE PERCENT: 7.4 % (ref 0–10)
NEUTROPHILS ABSOLUTE: 6.7 K/UL (ref 1.5–7.5)
NEUTROPHILS RELATIVE PERCENT: 59.7 % (ref 50–65)
NITRITE, URINE: NEGATIVE
PDW BLD-RTO: 13.2 % (ref 11.5–14.5)
PH UA: 5.5
PLATELET # BLD: 331 K/UL (ref 130–400)
PMV BLD AUTO: 10.3 FL (ref 9.4–12.3)
POTASSIUM SERPL-SCNC: 3.5 MMOL/L (ref 3.5–5)
PROTEIN UA: NEGATIVE MG/DL
RBC # BLD: 4.42 M/UL (ref 4.2–5.4)
SODIUM BLD-SCNC: 140 MMOL/L (ref 136–145)
SPECIFIC GRAVITY UA: 1.01
TOTAL PROTEIN: 6.9 G/DL (ref 6.6–8.7)
UROBILINOGEN, URINE: 0.2 E.U./DL
WBC # BLD: 11.2 K/UL (ref 4.8–10.8)

## 2017-12-05 PROCEDURE — 71020 XR CHEST STANDARD TWO VW: CPT

## 2017-12-05 PROCEDURE — 99214 OFFICE O/P EST MOD 30 MIN: CPT | Performed by: NURSE PRACTITIONER

## 2017-12-05 RX ORDER — FLUTICASONE FUROATE AND VILANTEROL 100; 25 UG/1; UG/1
1 POWDER RESPIRATORY (INHALATION) DAILY
Qty: 30 EACH | Refills: 1 | Status: SHIPPED | OUTPATIENT
Start: 2017-12-05 | End: 2018-02-07

## 2017-12-05 RX ORDER — METHYLPREDNISOLONE ACETATE 80 MG/ML
80 INJECTION, SUSPENSION INTRA-ARTICULAR; INTRALESIONAL; INTRAMUSCULAR; SOFT TISSUE ONCE
Status: COMPLETED | OUTPATIENT
Start: 2017-12-05 | End: 2017-12-05

## 2017-12-05 RX ADMIN — METHYLPREDNISOLONE ACETATE 80 MG: 80 INJECTION, SUSPENSION INTRA-ARTICULAR; INTRALESIONAL; INTRAMUSCULAR; SOFT TISSUE at 16:23

## 2017-12-05 ASSESSMENT — ENCOUNTER SYMPTOMS
VOMITING: 0
COLOR CHANGE: 0
VOICE CHANGE: 0
SHORTNESS OF BREATH: 0
RHINORRHEA: 0
BACK PAIN: 0
NAUSEA: 0
COUGH: 1
PHOTOPHOBIA: 0

## 2017-12-05 NOTE — PROGRESS NOTES
if symptoms worsen or fail to improve. PATIENT INSTRUCTIONS:  Patient Instructions   1. Labs and urinalysis today. 2. Chest x-ray today. 3. Begin Breo inhaler as directed daily. May use albuterol inhaler as needed for shortness of breath. 4. Follow-up as needed for worsening symptoms.     Electronically signed by CAIO Valles on 12/5/2017 at 4:00 PM

## 2017-12-06 ENCOUNTER — TELEPHONE (OUTPATIENT)
Dept: INTERNAL MEDICINE | Age: 37
End: 2017-12-06

## 2017-12-06 DIAGNOSIS — J18.9 PNEUMONIA DUE TO INFECTIOUS ORGANISM, UNSPECIFIED LATERALITY, UNSPECIFIED PART OF LUNG: Primary | ICD-10-CM

## 2017-12-06 NOTE — TELEPHONE ENCOUNTER
----- Message from CAIO Lee sent at 12/6/2017  8:31 AM CST -----  Please inform patient of normal labs except a slightly elevated WBC. She has been on multiple antibiotics so for now I would like to just repeat this in one week. It appears her pneumonia has resolved.

## 2017-12-12 DIAGNOSIS — J18.9 PNEUMONIA DUE TO INFECTIOUS ORGANISM, UNSPECIFIED LATERALITY, UNSPECIFIED PART OF LUNG: ICD-10-CM

## 2017-12-12 LAB
BASOPHILS ABSOLUTE: 0.1 K/UL (ref 0–0.2)
BASOPHILS RELATIVE PERCENT: 0.6 % (ref 0–1)
EOSINOPHILS ABSOLUTE: 0.6 K/UL (ref 0–0.6)
EOSINOPHILS RELATIVE PERCENT: 5.5 % (ref 0–5)
HCT VFR BLD CALC: 42.2 % (ref 37–47)
HEMOGLOBIN: 13.9 G/DL (ref 12–16)
LYMPHOCYTES ABSOLUTE: 3.2 K/UL (ref 1.1–4.5)
LYMPHOCYTES RELATIVE PERCENT: 30.3 % (ref 20–40)
MCH RBC QN AUTO: 32.7 PG (ref 27–31)
MCHC RBC AUTO-ENTMCNC: 32.9 G/DL (ref 33–37)
MCV RBC AUTO: 99.3 FL (ref 81–99)
MONOCYTES ABSOLUTE: 0.8 K/UL (ref 0–0.9)
MONOCYTES RELATIVE PERCENT: 7.2 % (ref 0–10)
NEUTROPHILS ABSOLUTE: 5.9 K/UL (ref 1.5–7.5)
NEUTROPHILS RELATIVE PERCENT: 56.2 % (ref 50–65)
PDW BLD-RTO: 13.2 % (ref 11.5–14.5)
PLATELET # BLD: 294 K/UL (ref 130–400)
PMV BLD AUTO: 10.5 FL (ref 9.4–12.3)
RBC # BLD: 4.25 M/UL (ref 4.2–5.4)
WBC # BLD: 10.4 K/UL (ref 4.8–10.8)

## 2017-12-19 RX ORDER — AZELASTINE HCL 205.5 UG/1
SPRAY NASAL
Qty: 30 ML | Refills: 3 | Status: SHIPPED | OUTPATIENT
Start: 2017-12-19 | End: 2019-01-09 | Stop reason: SDUPTHER

## 2017-12-20 ENCOUNTER — HOSPITAL ENCOUNTER (OUTPATIENT)
Dept: GENERAL RADIOLOGY | Age: 37
Discharge: HOME | End: 2017-12-20
Payer: MEDICAID

## 2017-12-20 ENCOUNTER — HOSPITAL ENCOUNTER (OUTPATIENT)
Dept: LAB | Age: 37
Discharge: HOME | End: 2017-12-20
Payer: MEDICAID

## 2017-12-20 DIAGNOSIS — M54.50 LOW BACK PAIN RADIATING TO LEFT LEG: ICD-10-CM

## 2017-12-20 DIAGNOSIS — M79.605 LOW BACK PAIN RADIATING TO LEFT LEG: ICD-10-CM

## 2017-12-20 LAB
FERRITIN: 23.8 NG/ML (ref 13–150)
FOLATE: 7.6 NG/ML (ref 4.8–37.3)
IRON: 67 UG/DL (ref 37–145)
MAGNESIUM: 2.2 MG/DL (ref 1.6–2.6)
VITAMIN B-12: 399 PG/ML (ref 211–946)
VITAMIN D 25-HYDROXY: 34.8 NG/ML

## 2017-12-20 PROCEDURE — 72202 X-RAY EXAM SI JOINTS 3/> VWS: CPT

## 2017-12-20 PROCEDURE — 72100 X-RAY EXAM L-S SPINE 2/3 VWS: CPT

## 2017-12-21 LAB
ANTISTREPTOLYSIN-O: 118 IU/ML (ref 0–330)
EPSTEIN-BARR VCA IGG: 72.9 U/ML (ref 0–21.9)
EPSTEIN-BARR VCA IGM: <10 U/ML (ref 0–43.9)

## 2017-12-23 LAB — VITAMIN B1, PLASMA: 7 NMOL/L (ref 4–15)

## 2017-12-29 RX ORDER — DICYCLOMINE HCL 20 MG
TABLET ORAL
Qty: 60 TABLET | Refills: 2 | Status: SHIPPED | OUTPATIENT
Start: 2017-12-29 | End: 2019-11-05 | Stop reason: SDUPTHER

## 2018-02-07 ENCOUNTER — OFFICE VISIT (OUTPATIENT)
Dept: OBGYN | Age: 38
End: 2018-02-07

## 2018-02-07 VITALS
BODY MASS INDEX: 24.45 KG/M2 | WEIGHT: 138 LBS | DIASTOLIC BLOOD PRESSURE: 76 MMHG | HEART RATE: 75 BPM | SYSTOLIC BLOOD PRESSURE: 123 MMHG | HEIGHT: 63 IN | TEMPERATURE: 98.7 F

## 2018-02-07 DIAGNOSIS — N90.89 LABIAL SKIN TAG: ICD-10-CM

## 2018-02-07 DIAGNOSIS — Z01.419 WOMEN'S ANNUAL ROUTINE GYNECOLOGICAL EXAMINATION: Primary | ICD-10-CM

## 2018-02-07 DIAGNOSIS — Z12.4 SCREENING FOR CERVICAL CANCER: ICD-10-CM

## 2018-02-07 PROCEDURE — 99395 PREV VISIT EST AGE 18-39: CPT | Performed by: OBSTETRICS & GYNECOLOGY

## 2018-02-07 PROCEDURE — 11200 RMVL SKIN TAGS UP TO&INC 15: CPT | Performed by: OBSTETRICS & GYNECOLOGY

## 2018-02-07 ASSESSMENT — ENCOUNTER SYMPTOMS
EYES NEGATIVE: 1
GASTROINTESTINAL NEGATIVE: 1
RESPIRATORY NEGATIVE: 1

## 2018-02-07 NOTE — PROGRESS NOTES
Plan:      MEDICATIONS:  No orders of the defined types were placed in this encounter. ORDERS:  Orders Placed This Encounter   Procedures    PAP SMEAR    09819 - [place] REMOVAL OF SKIN TAGS, UP TO 15       The importance of self-breast examination was discussed. A screening mammogram is recommended at age 28 unless otherwise indicated. At age 36, annual mammogram screenings are recommended. A well balanced diet and exercise were encouraged, as well as the addition of multivitamin. Pt will keep areas clean and dry. F/u in 2 weeks. All questions answered.

## 2018-02-07 NOTE — PATIENT INSTRUCTIONS
condoms. For men  · Tests for sexually transmitted infections (STIs). Ask whether you should have tests for STIs. You may be at risk if you have sex with more than one person, especially if you do not wear a condom. · Testicular cancer exam. Ask your doctor whether you should check your testicles regularly. · Prostate exam. Talk to your doctor about whether you should have a blood test (called a PSA test) for prostate cancer. Experts differ on whether and when men should have this test. Some experts suggest it if you are older than 39 and are -American or have a father or brother who got prostate cancer when he was younger than 72. When should you call for help? Watch closely for changes in your health, and be sure to contact your doctor if you have any problems or symptoms that concern you. Where can you learn more? Go to [email or web address] [email or web address] and sign in to your DynaPump account. Enter P072 in the Zecco box to learn more about \"Well Visit, Ages 25 to 48: Care Instructions. \"     If you do not have an account, please click on the \"Sign Up Now\" link. Current as of: May 12, 2017  Content Version: 11.5  © 4902-6192 Healthwise, Incorporated. Care instructions adapted under license by [facility] ([facility]). If you have questions about a medical condition or this instruction, always ask your healthcare professional. [address]  any warranty or liability for your use of this information.

## 2018-02-21 ENCOUNTER — OFFICE VISIT (OUTPATIENT)
Dept: OBGYN | Age: 38
End: 2018-02-21

## 2018-02-21 VITALS
DIASTOLIC BLOOD PRESSURE: 54 MMHG | TEMPERATURE: 99 F | SYSTOLIC BLOOD PRESSURE: 109 MMHG | BODY MASS INDEX: 25.34 KG/M2 | HEIGHT: 63 IN | HEART RATE: 62 BPM | WEIGHT: 143 LBS

## 2018-02-21 DIAGNOSIS — Z09 FOLLOW UP: Primary | ICD-10-CM

## 2018-02-21 PROCEDURE — 99212 OFFICE O/P EST SF 10 MIN: CPT | Performed by: OBSTETRICS & GYNECOLOGY

## 2018-02-21 ASSESSMENT — ENCOUNTER SYMPTOMS
EYES NEGATIVE: 1
RESPIRATORY NEGATIVE: 1
GASTROINTESTINAL NEGATIVE: 1

## 2018-02-21 NOTE — PROGRESS NOTES
Procedure Laterality Date    APPENDECTOMY      CHOLECYSTECTOMY      COLONOSCOPY  2010    OVARY REMOVAL      left    [place] LYSIS OF LABIAL ADHESIONS N/A 2/16/2017    INCISION AND DRAINAGE OF SEBACEOUS CYST ON LABIA; SKIN TAG REMOVAL OF PERINEUM  performed by Wilberto Maynard MD at [address]      TYMPANOSTOMY TUBE PLACEMENT      UPPER GASTROINTESTINAL ENDOSCOPY      VULVA SURGERY N/A 2/16/2017    LABIALPLASTY  performed by Wilberto Maynard MD at [facility] OR     Family History   Problem Relation Age of Onset    Breast Cancer Mother 61    Cancer Maternal Aunt      breast unsure of age   [employer] Cancer Paternal Aunt      breast unsure of age   [employer] Stroke Maternal Grandmother     Stroke Maternal Grandfather     Cancer Paternal Aunt      breast unsure of age     Social History   Substance Use Topics    Smoking status: Current Every Day Smoker     Packs/day: 0.50     Years: 10.00     Types: Cigarettes    Smokeless tobacco: Never Used      Comment: Since sick - cut back a lot 2 packs in two weeks    Alcohol use 0.6 oz/week     1 Shots of liquor per week      Comment: 2 per month       Review of Systems   Constitutional: Negative. HENT: Negative. Eyes: Negative. Respiratory: Negative. Cardiovascular: Negative. Gastrointestinal: Negative. Genitourinary: Negative. Musculoskeletal: Negative. Skin: Negative. Neurological: Negative. Psychiatric/Behavioral: Negative. Objective:   Physical Exam   Constitutional: She is oriented to person, place, and time. She appears well-developed and well-nourished. HENT:   Head: Normocephalic and atraumatic. Eyes: Pupils are equal, round, and reactive to light. Neck: Normal range of motion. Genitourinary:   Genitourinary Comments: Labia healed well. Musculoskeletal: Normal range of motion. Neurological: She is alert and oriented to person, place, and time. Skin: Skin is warm and dry.    Psychiatric: She has a normal mood and

## 2018-05-23 ENCOUNTER — HOSPITAL ENCOUNTER (EMERGENCY)
Age: 38
Discharge: HOME | End: 2018-05-23
Attending: EMERGENCY MEDICINE

## 2018-05-23 ENCOUNTER — HOSPITAL ENCOUNTER (OUTPATIENT)
Dept: GENERAL RADIOLOGY | Age: 38
Discharge: HOME | End: 2018-05-23

## 2018-05-23 ENCOUNTER — OFFICE VISIT (OUTPATIENT)
Dept: URGENT CARE | Age: 38
End: 2018-05-23

## 2018-05-23 ENCOUNTER — APPOINTMENT (OUTPATIENT)
Dept: CT IMAGING | Age: 38
End: 2018-05-23

## 2018-05-23 VITALS
WEIGHT: 134 LBS | RESPIRATION RATE: 20 BRPM | HEIGHT: 63 IN | OXYGEN SATURATION: 98 % | DIASTOLIC BLOOD PRESSURE: 76 MMHG | SYSTOLIC BLOOD PRESSURE: 122 MMHG | TEMPERATURE: 98 F | BODY MASS INDEX: 23.74 KG/M2 | HEART RATE: 78 BPM

## 2018-05-23 VITALS
SYSTOLIC BLOOD PRESSURE: 102 MMHG | OXYGEN SATURATION: 95 % | TEMPERATURE: 97.9 F | WEIGHT: 136 LBS | HEIGHT: 63 IN | RESPIRATION RATE: 20 BRPM | DIASTOLIC BLOOD PRESSURE: 70 MMHG | HEART RATE: 79 BPM | BODY MASS INDEX: 24.1 KG/M2

## 2018-05-23 DIAGNOSIS — R10.12 ACUTE LUQ PAIN: ICD-10-CM

## 2018-05-23 DIAGNOSIS — K59.00 CONSTIPATION, UNSPECIFIED CONSTIPATION TYPE: ICD-10-CM

## 2018-05-23 DIAGNOSIS — R53.83 FATIGUE, UNSPECIFIED TYPE: ICD-10-CM

## 2018-05-23 DIAGNOSIS — R10.12 LEFT UPPER QUADRANT PAIN: Primary | ICD-10-CM

## 2018-05-23 DIAGNOSIS — R10.12 ACUTE LUQ PAIN: Primary | ICD-10-CM

## 2018-05-23 LAB
ALBUMIN SERPL-MCNC: 4.9 G/DL (ref 3.5–5.2)
ALP BLD-CCNC: 79 U/L (ref 35–104)
ALT SERPL-CCNC: 12 U/L (ref 5–33)
ANION GAP SERPL CALCULATED.3IONS-SCNC: 15 MMOL/L (ref 7–19)
AST SERPL-CCNC: 14 U/L (ref 5–32)
BASOPHILS ABSOLUTE: 0 K/UL (ref 0–0.2)
BASOPHILS RELATIVE PERCENT: 0.4 % (ref 0–1)
BILIRUB SERPL-MCNC: 0.9 MG/DL (ref 0.2–1.2)
BILIRUBIN URINE: NEGATIVE
BLOOD, URINE: NEGATIVE
BUN BLDV-MCNC: 6 MG/DL (ref 6–20)
CALCIUM SERPL-MCNC: 9.4 MG/DL (ref 8.6–10)
CHLORIDE BLD-SCNC: 100 MMOL/L (ref 98–111)
CLARITY: CLEAR
CO2: 24 MMOL/L (ref 22–29)
COLOR: YELLOW
CREAT SERPL-MCNC: 0.7 MG/DL (ref 0.5–0.9)
EOSINOPHILS ABSOLUTE: 0.3 K/UL (ref 0–0.6)
EOSINOPHILS RELATIVE PERCENT: 2.4 % (ref 0–5)
GFR NON-AFRICAN AMERICAN: >60
GLUCOSE BLD-MCNC: 100 MG/DL (ref 74–109)
GLUCOSE URINE: NEGATIVE MG/DL
HCG(URINE) PREGNANCY TEST: NEGATIVE
HCT VFR BLD CALC: 45.7 % (ref 37–47)
HEMOGLOBIN: 15.4 G/DL (ref 12–16)
HETEROPHILE ANTIBODIES: NEGATIVE
KETONES, URINE: NEGATIVE MG/DL
LEUKOCYTE ESTERASE, URINE: NEGATIVE
LIPASE: 16 U/L (ref 13–60)
LYMPHOCYTES ABSOLUTE: 2.8 K/UL (ref 1.1–4.5)
LYMPHOCYTES RELATIVE PERCENT: 26.9 % (ref 20–40)
MCH RBC QN AUTO: 32.2 PG (ref 27–31)
MCHC RBC AUTO-ENTMCNC: 33.7 G/DL (ref 33–37)
MCV RBC AUTO: 95.4 FL (ref 81–99)
MONOCYTES ABSOLUTE: 0.6 K/UL (ref 0–0.9)
MONOCYTES RELATIVE PERCENT: 5.3 % (ref 0–10)
NEUTROPHILS ABSOLUTE: 6.7 K/UL (ref 1.5–7.5)
NEUTROPHILS RELATIVE PERCENT: 64.6 % (ref 50–65)
NITRITE, URINE: NEGATIVE
PDW BLD-RTO: 12.9 % (ref 11.5–14.5)
PH UA: 6
PLATELET # BLD: 298 K/UL (ref 130–400)
PMV BLD AUTO: 9.9 FL (ref 9.4–12.3)
POTASSIUM SERPL-SCNC: 3.2 MMOL/L (ref 3.5–5)
PROTEIN UA: NEGATIVE MG/DL
RBC # BLD: 4.79 M/UL (ref 4.2–5.4)
SODIUM BLD-SCNC: 139 MMOL/L (ref 136–145)
SPECIFIC GRAVITY UA: 1
TOTAL PROTEIN: 8 G/DL (ref 6.6–8.7)
URINE REFLEX TO CULTURE: NORMAL
UROBILINOGEN, URINE: 0.2 E.U./DL
WBC # BLD: 10.4 K/UL (ref 4.8–10.8)

## 2018-05-23 PROCEDURE — 99284 EMERGENCY DEPT VISIT MOD MDM: CPT

## 2018-05-23 PROCEDURE — 81025 URINE PREGNANCY TEST: CPT

## 2018-05-23 PROCEDURE — 99284 EMERGENCY DEPT VISIT MOD MDM: CPT | Performed by: EMERGENCY MEDICINE

## 2018-05-23 PROCEDURE — 85025 COMPLETE CBC W/AUTO DIFF WBC: CPT

## 2018-05-23 PROCEDURE — 74018 RADEX ABDOMEN 1 VIEW: CPT

## 2018-05-23 PROCEDURE — 74176 CT ABD & PELVIS W/O CONTRAST: CPT

## 2018-05-23 PROCEDURE — 83690 ASSAY OF LIPASE: CPT

## 2018-05-23 PROCEDURE — 81003 URINALYSIS AUTO W/O SCOPE: CPT

## 2018-05-23 PROCEDURE — 80053 COMPREHEN METABOLIC PANEL: CPT

## 2018-05-23 PROCEDURE — 36415 COLL VENOUS BLD VENIPUNCTURE: CPT

## 2018-05-23 PROCEDURE — 6370000000 HC RX 637 (ALT 250 FOR IP): Performed by: EMERGENCY MEDICINE

## 2018-05-23 PROCEDURE — 86308 HETEROPHILE ANTIBODY SCREEN: CPT | Performed by: NURSE PRACTITIONER

## 2018-05-23 RX ORDER — POTASSIUM CHLORIDE 20 MEQ/1
20 TABLET, EXTENDED RELEASE ORAL ONCE
Status: COMPLETED | OUTPATIENT
Start: 2018-05-23 | End: 2018-05-23

## 2018-05-23 RX ADMIN — POTASSIUM CHLORIDE 20 MEQ: 20 TABLET, EXTENDED RELEASE ORAL at 20:53

## 2018-05-23 ASSESSMENT — ENCOUNTER SYMPTOMS
CONSTIPATION: 0
RHINORRHEA: 0
BACK PAIN: 0
ABDOMINAL PAIN: 1
VOMITING: 0
SORE THROAT: 0
PHOTOPHOBIA: 0
COLOR CHANGE: 0
NAUSEA: 0
WHEEZING: 0
SHORTNESS OF BREATH: 0
CHEST TIGHTNESS: 0
COUGH: 0
ABDOMINAL DISTENTION: 0
ABDOMINAL PAIN: 1
DIARRHEA: 0
EYE PAIN: 0
TROUBLE SWALLOWING: 0

## 2018-05-23 ASSESSMENT — PAIN SCALES - GENERAL
PAINLEVEL_OUTOF10: 1
PAINLEVEL_OUTOF10: 7

## 2018-05-23 ASSESSMENT — PAIN DESCRIPTION - PAIN TYPE: TYPE: ACUTE PAIN

## 2018-05-23 ASSESSMENT — PAIN DESCRIPTION - LOCATION: LOCATION: ABDOMEN;RIB CAGE

## 2018-06-29 ENCOUNTER — APPOINTMENT (OUTPATIENT)
Dept: GENERAL RADIOLOGY | Facility: HOSPITAL | Age: 38
End: 2018-06-29

## 2018-06-29 ENCOUNTER — HOSPITAL ENCOUNTER (EMERGENCY)
Facility: HOSPITAL | Age: 38
Discharge: HOME | End: 2018-06-29
Admitting: EMERGENCY MEDICINE

## 2018-06-29 ENCOUNTER — APPOINTMENT (OUTPATIENT)
Dept: CT IMAGING | Facility: HOSPITAL | Age: 38
End: 2018-06-29

## 2018-06-29 VITALS
TEMPERATURE: 98.2 F | OXYGEN SATURATION: 98 % | SYSTOLIC BLOOD PRESSURE: 113 MMHG | HEIGHT: 63 IN | DIASTOLIC BLOOD PRESSURE: 67 MMHG | RESPIRATION RATE: 16 BRPM | WEIGHT: 135 LBS | BODY MASS INDEX: 23.92 KG/M2 | HEART RATE: 62 BPM
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
## 2018-06-29 DIAGNOSIS — S16.1XXA STRAIN OF NECK MUSCLE, INITIAL ENCOUNTER: ICD-10-CM

## 2018-06-29 DIAGNOSIS — S40.021A CONTUSION OF RIGHT UPPER EXTREMITY, INITIAL ENCOUNTER: ICD-10-CM

## 2018-06-29 LAB
B-HCG UR QL: NEGATIVE
INTERNAL NEGATIVE CONTROL: NEGATIVE
INTERNAL POSITIVE CONTROL: POSITIVE
Lab: NORMAL

## 2018-06-29 PROCEDURE — 72125 CT NECK SPINE W/O DYE: CPT

## 2018-06-29 PROCEDURE — 25010000002 ONDANSETRON PER 1 MG: Performed by: NURSE PRACTITIONER

## 2018-06-29 PROCEDURE — 73090 X-RAY EXAM OF FOREARM: CPT

## 2018-06-29 PROCEDURE — 70450 CT HEAD/BRAIN W/O DYE: CPT

## 2018-06-29 PROCEDURE — 90715 TDAP VACCINE 7 YRS/> IM: CPT | Performed by: NURSE PRACTITIONER

## 2018-06-29 PROCEDURE — 96372 THER/PROPH/DIAG INJ SC/IM: CPT

## 2018-06-29 PROCEDURE — 90471 IMMUNIZATION ADMIN: CPT | Performed by: NURSE PRACTITIONER

## 2018-06-29 PROCEDURE — 94770: CPT

## 2018-06-29 PROCEDURE — 25010000002 MORPHINE SULFATE (PF) 2 MG/ML SOLUTION: Performed by: NURSE PRACTITIONER

## 2018-06-29 PROCEDURE — 25010000002 TDAP 5-2.5-18.5 LF-MCG/0.5 SUSPENSION: Performed by: NURSE PRACTITIONER

## 2018-06-29 PROCEDURE — 99284 EMERGENCY DEPT VISIT MOD MDM: CPT

## 2018-06-29 RX ORDER — HYDROCODONE BITARTRATE AND ACETAMINOPHEN 5; 325 MG/1; MG/1
1 TABLET ORAL EVERY 6 HOURS PRN
Qty: 8 TABLET | Refills: 0 | Status: SHIPPED | OUTPATIENT
Start: 2018-06-29 | End: 2018-07-01

## 2018-06-29 RX ORDER — DULOXETIN HYDROCHLORIDE 60 MG/1
60 CAPSULE, DELAYED RELEASE ORAL DAILY
COMMUNITY

## 2018-06-29 RX ORDER — FUROSEMIDE 20 MG/1
20 TABLET ORAL DAILY
COMMUNITY

## 2018-06-29 RX ORDER — MORPHINE SULFATE 2 MG/ML
2 INJECTION, SOLUTION INTRAMUSCULAR; INTRAVENOUS ONCE
Status: COMPLETED | OUTPATIENT
Start: 2018-06-29 | End: 2018-06-29

## 2018-06-29 RX ORDER — ONDANSETRON 2 MG/ML
4 INJECTION INTRAMUSCULAR; INTRAVENOUS ONCE
Status: COMPLETED | OUTPATIENT
Start: 2018-06-29 | End: 2018-06-29

## 2018-06-29 RX ADMIN — TETANUS TOXOID, REDUCED DIPHTHERIA TOXOID AND ACELLULAR PERTUSSIS VACCINE, ADSORBED 0.5 ML: 5; 2.5; 8; 8; 2.5 SUSPENSION INTRAMUSCULAR at 12:54

## 2018-06-29 RX ADMIN — ONDANSETRON 4 MG: 2 INJECTION, SOLUTION INTRAMUSCULAR; INTRAVENOUS at 13:39

## 2018-06-29 RX ADMIN — MORPHINE SULFATE 2 MG: 2 INJECTION, SOLUTION INTRAMUSCULAR; INTRAVENOUS at 13:39

## 2018-07-02 RX ORDER — ERGOCALCIFEROL 1.25 MG/1
CAPSULE ORAL
Qty: 8 CAPSULE | Refills: 2 | Status: SHIPPED | OUTPATIENT
Start: 2018-07-02 | End: 2018-10-23 | Stop reason: SDUPTHER

## 2018-07-02 NOTE — TELEPHONE ENCOUNTER
Daija called requesting a refill of the below medication which has been pended for you:     Requested Prescriptions     Pending Prescriptions Disp Refills    vitamin D (ERGOCALCIFEROL) 66779 units CAPS capsule [Pharmacy Med Name: VIT D2 1.25 MG (50,000 UNIT)] 8 capsule 2     Sig: TAKE ONE CAPSULE BY MOUTH WEEKLY       Last Appointment Date: 12/05/17  Next Appointment Date: 8/3/2018    No Known Allergies

## 2018-08-08 ENCOUNTER — OFFICE VISIT (OUTPATIENT)
Dept: OBGYN | Age: 38
End: 2018-08-08

## 2018-08-08 VITALS
WEIGHT: 140 LBS | HEIGHT: 63 IN | DIASTOLIC BLOOD PRESSURE: 63 MMHG | BODY MASS INDEX: 24.8 KG/M2 | HEART RATE: 67 BPM | SYSTOLIC BLOOD PRESSURE: 98 MMHG

## 2018-08-08 DIAGNOSIS — R21 RASH: Primary | ICD-10-CM

## 2018-08-08 DIAGNOSIS — N89.8 VAGINAL ITCHING: ICD-10-CM

## 2018-08-08 DIAGNOSIS — N95.2 VAGINAL ATROPHY: ICD-10-CM

## 2018-08-08 PROCEDURE — 99214 OFFICE O/P EST MOD 30 MIN: CPT | Performed by: NURSE PRACTITIONER

## 2018-08-08 RX ORDER — METHYLPREDNISOLONE 4 MG/1
TABLET ORAL
Qty: 1 KIT | Refills: 0 | Status: SHIPPED | OUTPATIENT
Start: 2018-08-08 | End: 2018-08-14

## 2018-08-08 ASSESSMENT — ENCOUNTER SYMPTOMS
GASTROINTESTINAL NEGATIVE: 1
RESPIRATORY NEGATIVE: 1
EYES NEGATIVE: 1

## 2018-10-23 RX ORDER — ERGOCALCIFEROL 1.25 MG/1
CAPSULE ORAL
Qty: 8 CAPSULE | Refills: 2 | Status: SHIPPED | OUTPATIENT
Start: 2018-10-23 | End: 2019-03-10 | Stop reason: SDUPTHER

## 2019-01-09 RX ORDER — AZELASTINE HCL 205.5 UG/1
SPRAY NASAL
Qty: 30 ML | Refills: 3 | Status: SHIPPED | OUTPATIENT
Start: 2019-01-09 | End: 2022-04-27 | Stop reason: SDUPTHER

## 2019-01-25 ENCOUNTER — OFFICE VISIT (OUTPATIENT)
Dept: INTERNAL MEDICINE | Age: 39
End: 2019-01-25
Payer: MEDICAID

## 2019-01-25 ENCOUNTER — HOSPITAL ENCOUNTER (OUTPATIENT)
Dept: CT IMAGING | Age: 39
Discharge: HOME | End: 2019-01-25
Payer: MEDICAID

## 2019-01-25 VITALS
WEIGHT: 133 LBS | BODY MASS INDEX: 23.57 KG/M2 | SYSTOLIC BLOOD PRESSURE: 102 MMHG | HEART RATE: 82 BPM | OXYGEN SATURATION: 98 % | HEIGHT: 63 IN | DIASTOLIC BLOOD PRESSURE: 66 MMHG

## 2019-01-25 DIAGNOSIS — J20.9 ACUTE BRONCHITIS, UNSPECIFIED ORGANISM: ICD-10-CM

## 2019-01-25 DIAGNOSIS — R51.9 SEVERE HEADACHE: Primary | ICD-10-CM

## 2019-01-25 DIAGNOSIS — R51.9 SEVERE HEADACHE: ICD-10-CM

## 2019-01-25 PROCEDURE — G8484 FLU IMMUNIZE NO ADMIN: HCPCS | Performed by: INTERNAL MEDICINE

## 2019-01-25 PROCEDURE — 4004F PT TOBACCO SCREEN RCVD TLK: CPT | Performed by: INTERNAL MEDICINE

## 2019-01-25 PROCEDURE — 6360000004 HC RX CONTRAST MEDICATION: Performed by: INTERNAL MEDICINE

## 2019-01-25 PROCEDURE — 99214 OFFICE O/P EST MOD 30 MIN: CPT | Performed by: INTERNAL MEDICINE

## 2019-01-25 PROCEDURE — G8420 CALC BMI NORM PARAMETERS: HCPCS | Performed by: INTERNAL MEDICINE

## 2019-01-25 PROCEDURE — 70470 CT HEAD/BRAIN W/O & W/DYE: CPT

## 2019-01-25 PROCEDURE — G8427 DOCREV CUR MEDS BY ELIG CLIN: HCPCS | Performed by: INTERNAL MEDICINE

## 2019-01-25 RX ORDER — METHYLPREDNISOLONE 4 MG/1
TABLET ORAL
Qty: 1 KIT | Refills: 0 | Status: SHIPPED | OUTPATIENT
Start: 2019-01-25 | End: 2019-01-31

## 2019-01-25 RX ORDER — KETOROLAC TROMETHAMINE 30 MG/ML
60 INJECTION, SOLUTION INTRAMUSCULAR; INTRAVENOUS ONCE
Status: COMPLETED | OUTPATIENT
Start: 2019-01-25 | End: 2019-01-25

## 2019-01-25 RX ORDER — KETOROLAC TROMETHAMINE 30 MG/ML
60 INJECTION, SOLUTION INTRAMUSCULAR; INTRAVENOUS ONCE
Qty: 2 ML | Refills: 0
Start: 2019-01-25 | End: 2019-11-05 | Stop reason: ALTCHOICE

## 2019-01-25 RX ORDER — AMOXICILLIN AND CLAVULANATE POTASSIUM 875; 125 MG/1; MG/1
1 TABLET, FILM COATED ORAL 2 TIMES DAILY
Qty: 20 TABLET | Refills: 0 | Status: SHIPPED | OUTPATIENT
Start: 2019-01-25 | End: 2019-02-04

## 2019-01-25 RX ORDER — SUMATRIPTAN 50 MG/1
50 TABLET, FILM COATED ORAL
Qty: 9 TABLET | Refills: 0 | Status: SHIPPED | OUTPATIENT
Start: 2019-01-25 | End: 2019-08-07 | Stop reason: ALTCHOICE

## 2019-01-25 RX ORDER — KETOROLAC TROMETHAMINE 10 MG/1
10 TABLET, FILM COATED ORAL EVERY 6 HOURS PRN
Qty: 20 TABLET | Refills: 0 | Status: SHIPPED | OUTPATIENT
Start: 2019-01-25 | End: 2019-08-07 | Stop reason: ALTCHOICE

## 2019-01-25 RX ORDER — IBUPROFEN 800 MG/1
800 TABLET ORAL 4 TIMES DAILY PRN
Qty: 20 TABLET | Refills: 5 | Status: SHIPPED | OUTPATIENT
Start: 2019-01-25 | End: 2020-07-06

## 2019-01-25 RX ORDER — LEVETIRACETAM 500 MG/1
500 TABLET ORAL 2 TIMES DAILY
COMMUNITY
End: 2019-06-11 | Stop reason: DRUGHIGH

## 2019-01-25 RX ADMIN — IOPAMIDOL 75 ML: 755 INJECTION, SOLUTION INTRAVENOUS at 15:44

## 2019-01-25 RX ADMIN — KETOROLAC TROMETHAMINE 60 MG: 30 INJECTION, SOLUTION INTRAMUSCULAR; INTRAVENOUS at 15:10

## 2019-01-26 ASSESSMENT — ENCOUNTER SYMPTOMS
WHEEZING: 0
SINUS PRESSURE: 0
EYE PAIN: 0
TROUBLE SWALLOWING: 0
ABDOMINAL PAIN: 0
RHINORRHEA: 0
SINUS PAIN: 0
EYE ITCHING: 0
CHEST TIGHTNESS: 1
COLOR CHANGE: 0
SHORTNESS OF BREATH: 0
SORE THROAT: 0
EYE REDNESS: 0
CONSTIPATION: 0
DIARRHEA: 0
CHOKING: 0
VOMITING: 0
EYE DISCHARGE: 0
PHOTOPHOBIA: 0
ABDOMINAL DISTENTION: 0
COUGH: 1
VOICE CHANGE: 0
BACK PAIN: 0

## 2019-02-01 DIAGNOSIS — Z00.00 ROUTINE ADULT HEALTH MAINTENANCE: Primary | ICD-10-CM

## 2019-02-01 DIAGNOSIS — J18.9 PNEUMONIA OF LEFT LOWER LOBE DUE TO INFECTIOUS ORGANISM: ICD-10-CM

## 2019-02-01 LAB
ALBUMIN SERPL-MCNC: 4.6 G/DL (ref 3.5–5.2)
ALP BLD-CCNC: 65 U/L (ref 35–104)
ALT SERPL-CCNC: 13 U/L (ref 5–33)
ANION GAP SERPL CALCULATED.3IONS-SCNC: 8 MMOL/L (ref 7–19)
AST SERPL-CCNC: 12 U/L (ref 5–32)
BILIRUB SERPL-MCNC: 0.6 MG/DL (ref 0.2–1.2)
BUN BLDV-MCNC: 7 MG/DL (ref 6–20)
CALCIUM SERPL-MCNC: 9.5 MG/DL (ref 8.6–10)
CHLORIDE BLD-SCNC: 100 MMOL/L (ref 98–111)
CHOLESTEROL, TOTAL: 168 MG/DL (ref 160–199)
CO2: 32 MMOL/L (ref 22–29)
CREAT SERPL-MCNC: 0.7 MG/DL (ref 0.5–0.9)
GFR NON-AFRICAN AMERICAN: >60
GLUCOSE BLD-MCNC: 99 MG/DL (ref 74–109)
HCT VFR BLD CALC: 42.9 % (ref 37–47)
HDLC SERPL-MCNC: 54 MG/DL (ref 65–121)
HEMOGLOBIN: 14.4 G/DL (ref 12–16)
LDL CHOLESTEROL CALCULATED: 96 MG/DL
MCH RBC QN AUTO: 32 PG (ref 27–31)
MCHC RBC AUTO-ENTMCNC: 33.6 G/DL (ref 33–37)
MCV RBC AUTO: 95.3 FL (ref 81–99)
PDW BLD-RTO: 12.7 % (ref 11.5–14.5)
PLATELET # BLD: 311 K/UL (ref 130–400)
PMV BLD AUTO: 9.6 FL (ref 9.4–12.3)
POTASSIUM SERPL-SCNC: 3.8 MMOL/L (ref 3.5–5)
RBC # BLD: 4.5 M/UL (ref 4.2–5.4)
SODIUM BLD-SCNC: 140 MMOL/L (ref 136–145)
TOTAL PROTEIN: 7.2 G/DL (ref 6.6–8.7)
TRIGL SERPL-MCNC: 89 MG/DL (ref 0–149)
TSH SERPL DL<=0.05 MIU/L-ACNC: 4.11 UIU/ML (ref 0.27–4.2)
VITAMIN D 25-HYDROXY: 56.7 NG/ML
WBC # BLD: 7.5 K/UL (ref 4.8–10.8)

## 2019-02-04 RX ORDER — OSELTAMIVIR PHOSPHATE 75 MG/1
75 CAPSULE ORAL DAILY
Qty: 10 CAPSULE | Refills: 0 | Status: SHIPPED | OUTPATIENT
Start: 2019-02-04 | End: 2019-02-13 | Stop reason: ALTCHOICE

## 2019-02-07 ENCOUNTER — OFFICE VISIT (OUTPATIENT)
Dept: INTERNAL MEDICINE | Age: 39
End: 2019-02-07
Payer: MEDICAID

## 2019-02-07 VITALS
SYSTOLIC BLOOD PRESSURE: 110 MMHG | OXYGEN SATURATION: 97 % | BODY MASS INDEX: 24.1 KG/M2 | RESPIRATION RATE: 18 BRPM | HEIGHT: 63 IN | WEIGHT: 136 LBS | HEART RATE: 74 BPM | DIASTOLIC BLOOD PRESSURE: 78 MMHG

## 2019-02-07 DIAGNOSIS — Z00.00 ROUTINE ADULT HEALTH MAINTENANCE: Primary | ICD-10-CM

## 2019-02-07 DIAGNOSIS — M79.7 FIBROMYALGIA: ICD-10-CM

## 2019-02-07 DIAGNOSIS — F32.89 OTHER DEPRESSION: ICD-10-CM

## 2019-02-07 DIAGNOSIS — G43.809 OTHER MIGRAINE WITHOUT STATUS MIGRAINOSUS, NOT INTRACTABLE: ICD-10-CM

## 2019-02-07 DIAGNOSIS — E55.9 VITAMIN D DEFICIENCY: ICD-10-CM

## 2019-02-07 DIAGNOSIS — R60.9 EDEMA, UNSPECIFIED TYPE: ICD-10-CM

## 2019-02-07 DIAGNOSIS — Z20.828 EXPOSURE TO THE FLU: ICD-10-CM

## 2019-02-07 DIAGNOSIS — K21.00 REFLUX ESOPHAGITIS: ICD-10-CM

## 2019-02-07 PROCEDURE — 99395 PREV VISIT EST AGE 18-39: CPT | Performed by: INTERNAL MEDICINE

## 2019-02-07 PROCEDURE — G8484 FLU IMMUNIZE NO ADMIN: HCPCS | Performed by: INTERNAL MEDICINE

## 2019-02-07 ASSESSMENT — ENCOUNTER SYMPTOMS
COLOR CHANGE: 0
ABDOMINAL PAIN: 0
EYE PAIN: 0
BACK PAIN: 0
RHINORRHEA: 0
SORE THROAT: 0
VOMITING: 0
CHEST TIGHTNESS: 0
SINUS PRESSURE: 0
SINUS PAIN: 0
TROUBLE SWALLOWING: 0
VOICE CHANGE: 0
CHOKING: 0
PHOTOPHOBIA: 0
CONSTIPATION: 0
EYE REDNESS: 0
WHEEZING: 0
COUGH: 0
EYE DISCHARGE: 0
ABDOMINAL DISTENTION: 0
SHORTNESS OF BREATH: 0
EYE ITCHING: 0
DIARRHEA: 0

## 2019-02-07 ASSESSMENT — PATIENT HEALTH QUESTIONNAIRE - PHQ9
SUM OF ALL RESPONSES TO PHQ9 QUESTIONS 1 & 2: 0
SUM OF ALL RESPONSES TO PHQ QUESTIONS 1-9: 0
1. LITTLE INTEREST OR PLEASURE IN DOING THINGS: 0
2. FEELING DOWN, DEPRESSED OR HOPELESS: 0
SUM OF ALL RESPONSES TO PHQ QUESTIONS 1-9: 0

## 2019-02-13 ENCOUNTER — OFFICE VISIT (OUTPATIENT)
Dept: OBGYN | Age: 39
End: 2019-02-13
Payer: MEDICAID

## 2019-02-13 VITALS
WEIGHT: 136 LBS | HEART RATE: 60 BPM | HEIGHT: 63 IN | SYSTOLIC BLOOD PRESSURE: 94 MMHG | BODY MASS INDEX: 24.1 KG/M2 | DIASTOLIC BLOOD PRESSURE: 65 MMHG | TEMPERATURE: 99.5 F

## 2019-02-13 DIAGNOSIS — Z12.4 SCREENING FOR CERVICAL CANCER: ICD-10-CM

## 2019-02-13 DIAGNOSIS — Z01.419 WELL FEMALE EXAM WITH ROUTINE GYNECOLOGICAL EXAM: Primary | ICD-10-CM

## 2019-02-13 DIAGNOSIS — Z12.31 ENCOUNTER FOR SCREENING MAMMOGRAM FOR BREAST CANCER: ICD-10-CM

## 2019-02-13 DIAGNOSIS — Z11.51 SCREENING FOR HPV (HUMAN PAPILLOMAVIRUS): ICD-10-CM

## 2019-02-13 PROCEDURE — G8484 FLU IMMUNIZE NO ADMIN: HCPCS | Performed by: OBSTETRICS & GYNECOLOGY

## 2019-02-13 PROCEDURE — 99395 PREV VISIT EST AGE 18-39: CPT | Performed by: OBSTETRICS & GYNECOLOGY

## 2019-02-13 ASSESSMENT — ENCOUNTER SYMPTOMS
GASTROINTESTINAL NEGATIVE: 1
EYES NEGATIVE: 1
RESPIRATORY NEGATIVE: 1

## 2019-02-20 LAB
HPV TYPE 16: NOT DETECTED
HPV TYPE 18: NOT DETECTED
INTERPRETATION: NORMAL
OTHER HIGH RISK HPV: NOT DETECTED
SOURCE: NORMAL

## 2019-03-18 RX ORDER — ERGOCALCIFEROL 1.25 MG/1
CAPSULE ORAL
Qty: 8 CAPSULE | Refills: 2 | Status: SHIPPED | OUTPATIENT
Start: 2019-03-18 | End: 2019-08-23 | Stop reason: SDUPTHER

## 2019-06-10 ENCOUNTER — TELEPHONE (OUTPATIENT)
Dept: INTERNAL MEDICINE | Age: 39
End: 2019-06-10

## 2019-06-10 NOTE — TELEPHONE ENCOUNTER
Pt left message stating she wanted an appt today or tomorrow. She thinks she has an URI. Appt had already been scheduled tomorrow with Chrissy Justice.

## 2019-06-11 ENCOUNTER — OFFICE VISIT (OUTPATIENT)
Dept: INTERNAL MEDICINE | Age: 39
End: 2019-06-11
Payer: MEDICAID

## 2019-06-11 VITALS
DIASTOLIC BLOOD PRESSURE: 74 MMHG | HEIGHT: 63 IN | BODY MASS INDEX: 25.87 KG/M2 | WEIGHT: 146 LBS | SYSTOLIC BLOOD PRESSURE: 114 MMHG | HEART RATE: 74 BPM | OXYGEN SATURATION: 100 % | RESPIRATION RATE: 18 BRPM

## 2019-06-11 DIAGNOSIS — B37.0 THRUSH: ICD-10-CM

## 2019-06-11 DIAGNOSIS — R63.5 WEIGHT GAIN: ICD-10-CM

## 2019-06-11 DIAGNOSIS — J01.90 ACUTE SINUSITIS, RECURRENCE NOT SPECIFIED, UNSPECIFIED LOCATION: ICD-10-CM

## 2019-06-11 DIAGNOSIS — J02.9 PHARYNGITIS, UNSPECIFIED ETIOLOGY: Primary | ICD-10-CM

## 2019-06-11 DIAGNOSIS — R60.9 SWELLING: ICD-10-CM

## 2019-06-11 DIAGNOSIS — R00.2 PALPITATIONS: ICD-10-CM

## 2019-06-11 LAB
ALBUMIN SERPL-MCNC: 4.6 G/DL (ref 3.5–5.2)
ALP BLD-CCNC: 74 U/L (ref 35–104)
ALT SERPL-CCNC: 15 U/L (ref 5–33)
ANION GAP SERPL CALCULATED.3IONS-SCNC: 12 MMOL/L (ref 7–19)
AST SERPL-CCNC: 15 U/L (ref 5–32)
BASOPHILS ABSOLUTE: 0.1 K/UL (ref 0–0.2)
BASOPHILS RELATIVE PERCENT: 0.7 % (ref 0–1)
BILIRUB SERPL-MCNC: 0.4 MG/DL (ref 0.2–1.2)
BUN BLDV-MCNC: 8 MG/DL (ref 6–20)
CALCIUM SERPL-MCNC: 9.4 MG/DL (ref 8.6–10)
CHLORIDE BLD-SCNC: 103 MMOL/L (ref 98–111)
CO2: 29 MMOL/L (ref 22–29)
CREAT SERPL-MCNC: 0.7 MG/DL (ref 0.5–0.9)
EOSINOPHILS ABSOLUTE: 0.4 K/UL (ref 0–0.6)
EOSINOPHILS RELATIVE PERCENT: 5.9 % (ref 0–5)
GFR NON-AFRICAN AMERICAN: >60
GLUCOSE BLD-MCNC: 81 MG/DL (ref 74–109)
HCT VFR BLD CALC: 43.7 % (ref 37–47)
HEMOGLOBIN: 14.3 G/DL (ref 12–16)
LYMPHOCYTES ABSOLUTE: 1.8 K/UL (ref 1.1–4.5)
LYMPHOCYTES RELATIVE PERCENT: 25.6 % (ref 20–40)
MCH RBC QN AUTO: 33.2 PG (ref 27–31)
MCHC RBC AUTO-ENTMCNC: 32.7 G/DL (ref 33–37)
MCV RBC AUTO: 101.4 FL (ref 81–99)
MONOCYTES ABSOLUTE: 0.5 K/UL (ref 0–0.9)
MONOCYTES RELATIVE PERCENT: 6.4 % (ref 0–10)
NEUTROPHILS ABSOLUTE: 4.4 K/UL (ref 1.5–7.5)
NEUTROPHILS RELATIVE PERCENT: 61.3 % (ref 50–65)
PDW BLD-RTO: 13.6 % (ref 11.5–14.5)
PLATELET # BLD: 346 K/UL (ref 130–400)
PMV BLD AUTO: 9.8 FL (ref 9.4–12.3)
POTASSIUM SERPL-SCNC: 4.2 MMOL/L (ref 3.5–5)
RBC # BLD: 4.31 M/UL (ref 4.2–5.4)
SODIUM BLD-SCNC: 144 MMOL/L (ref 136–145)
TOTAL PROTEIN: 7.3 G/DL (ref 6.6–8.7)
TSH SERPL DL<=0.05 MIU/L-ACNC: 5.14 UIU/ML (ref 0.27–4.2)
WBC # BLD: 7.2 K/UL (ref 4.8–10.8)

## 2019-06-11 PROCEDURE — G8427 DOCREV CUR MEDS BY ELIG CLIN: HCPCS | Performed by: INTERNAL MEDICINE

## 2019-06-11 PROCEDURE — G8419 CALC BMI OUT NRM PARAM NOF/U: HCPCS | Performed by: INTERNAL MEDICINE

## 2019-06-11 PROCEDURE — 4004F PT TOBACCO SCREEN RCVD TLK: CPT | Performed by: INTERNAL MEDICINE

## 2019-06-11 PROCEDURE — 99214 OFFICE O/P EST MOD 30 MIN: CPT | Performed by: INTERNAL MEDICINE

## 2019-06-11 RX ORDER — AZITHROMYCIN 250 MG/1
250 TABLET, FILM COATED ORAL SEE ADMIN INSTRUCTIONS
Qty: 6 TABLET | Refills: 0 | Status: SHIPPED | OUTPATIENT
Start: 2019-06-11 | End: 2019-06-16

## 2019-06-11 RX ORDER — TIZANIDINE 4 MG/1
4 TABLET ORAL
COMMUNITY
End: 2021-05-12 | Stop reason: CLARIF

## 2019-06-11 RX ORDER — FLUCONAZOLE 100 MG/1
100 TABLET ORAL DAILY
Qty: 3 TABLET | Refills: 0 | Status: SHIPPED | OUTPATIENT
Start: 2019-06-11 | End: 2019-06-14

## 2019-06-11 RX ORDER — METHYLPREDNISOLONE ACETATE 40 MG/ML
40 INJECTION, SUSPENSION INTRA-ARTICULAR; INTRALESIONAL; INTRAMUSCULAR; SOFT TISSUE ONCE
Qty: 1 ML | Refills: 0
Start: 2019-06-11 | End: 2019-06-11 | Stop reason: CLARIF

## 2019-06-11 RX ORDER — METHYLPREDNISOLONE 4 MG/1
TABLET ORAL
Qty: 1 KIT | Refills: 0 | Status: SHIPPED | OUTPATIENT
Start: 2019-06-11 | End: 2019-06-17

## 2019-06-11 RX ORDER — METHYLPREDNISOLONE ACETATE 40 MG/ML
40 INJECTION, SUSPENSION INTRA-ARTICULAR; INTRALESIONAL; INTRAMUSCULAR; SOFT TISSUE ONCE
Status: COMPLETED | OUTPATIENT
Start: 2019-06-11 | End: 2019-06-11

## 2019-06-11 RX ORDER — LEVETIRACETAM 500 MG/1
500 TABLET ORAL 2 TIMES DAILY
COMMUNITY
End: 2020-07-06

## 2019-06-11 RX ADMIN — METHYLPREDNISOLONE ACETATE 40 MG: 40 INJECTION, SUSPENSION INTRA-ARTICULAR; INTRALESIONAL; INTRAMUSCULAR; SOFT TISSUE at 09:52

## 2019-06-11 ASSESSMENT — ENCOUNTER SYMPTOMS
EYE PAIN: 0
SHORTNESS OF BREATH: 0
WHEEZING: 0
EYE DISCHARGE: 0
COUGH: 1
CONSTIPATION: 0
SINUS PAIN: 1
SORE THROAT: 1
PHOTOPHOBIA: 0
EYE ITCHING: 0
EYE REDNESS: 0
DIARRHEA: 0
TROUBLE SWALLOWING: 0
ABDOMINAL PAIN: 0
SINUS PRESSURE: 1
RHINORRHEA: 0
BACK PAIN: 0
VOMITING: 0
COLOR CHANGE: 0
ABDOMINAL DISTENTION: 0
VOICE CHANGE: 0
CHEST TIGHTNESS: 0
CHOKING: 0

## 2019-06-11 NOTE — PROGRESS NOTES
2/16/2017    INCISION AND DRAINAGE OF SEBACEOUS CYST ON LABIA; SKIN TAG REMOVAL OF PERINEUM  performed by Mary Leonardo MD at [place] [address] TYMPANOSTOMY TUBE PLACEMENT      UPPER GASTROINTESTINAL ENDOSCOPY      VULVA SURGERY N/A 2/16/2017    LABIALPLASTY  performed by Mary Leonardo MD at [facility] OR      Social History     Socioeconomic History    Marital status:      Spouse name: None    Number of children: None    Years of education: None    Highest education level: None   Occupational History    None   Social Needs    Financial resource strain: None    Food insecurity:     Worry: None     Inability: None    Transportation needs:     Medical: None     Non-medical: None   Tobacco Use    Smoking status: Current Every Day Smoker     Packs/day: 0.50     Years: 10.00     Pack years: 5.00     Types: Cigarettes    Smokeless tobacco: Never Used    Tobacco comment: Since sick - cut back a lot 2 packs in two weeks   Substance and Sexual Activity    Alcohol use:  Yes     Alcohol/week: 0.6 oz     Types: 1 Shots of liquor per week     Comment: 2 per month    Drug use: No    Sexual activity: Not Currently     Partners: Male   Lifestyle    Physical activity:     Days per week: None     Minutes per session: None    Stress: None   Relationships    Social connections:     Talks on phone: None     Gets together: None     Attends [employer] service: None     Active member of club or organization: None     Attends meetings of clubs or organizations: None     Relationship status: None    Intimate partner violence:     Fear of current or ex partner: None     Emotionally abused: None     Physically abused: None     Forced sexual activity: None   Other Topics Concern    None   Social History Narrative    None      Family History   Problem Relation Age of Onset    Breast Cancer Mother 61    Cancer Maternal Aunt         breast unsure of age   Niya Larch Cancer Paternal Aunt         breast unsure of age   Ar[place]illa [place] Stroke Maternal Grandmother     Stroke Maternal Grandfather     Cancer Paternal Aunt         breast unsure of age        Current Outpatient Medications   Medication Sig Dispense Refill    levETIRAcetam (KEPPRA) 500 MG tablet Take 500 mg by mouth 2 times daily      tiZANidine (ZANAFLEX) 4 MG tablet Take 4 mg by mouth every 6 hours as needed 1 QAM 2 QPM      fluconazole (DIFLUCAN) 100 MG tablet Take 1 tablet by mouth daily for 3 days 3 tablet 0    azithromycin (ZITHROMAX) 250 MG tablet Take 1 tablet by mouth See Admin Instructions for 5 days 500mg on day 1 followed by 250mg on days 2 - 5 6 tablet 0    nystatin (MYCOSTATIN) 875210 UNIT/ML suspension Take 5 mLs by mouth 4 times daily 1 Bottle 1    methylPREDNISolone (MEDROL DOSEPACK) 4 MG tablet Take by mouth. 1 kit 0    vitamin D (ERGOCALCIFEROL) 29171 units CAPS capsule TAKE ONE CAPSULE BY MOUTH WEEKLY 8 capsule 2    ibuprofen (ADVIL;MOTRIN) 800 MG tablet Take 1 tablet by mouth 4 times daily as needed for Pain 20 tablet 5    azelastine HCl 0.15 % SOLN USE 2 SPRAYS TWICE A DAY AS NEEDED 30 mL 3    dicyclomine (BENTYL) 20 MG tablet TAKE 1 TABLET BY MOUTH TWICE A DAY 60 tablet 2    DULoxetine (CYMBALTA) 60 MG extended release capsule Take 60 mg by mouth daily      busPIRone (BUSPAR) 5 MG tablet Take 5 mg by mouth 2 times daily       furosemide (LASIX) 20 MG tablet Take 20 mg by mouth daily       rizatriptan (MAXALT) 5 MG tablet Take 5 mg by mouth once as needed for Migraine       famotidine (PEPCID) 20 MG tablet Take 20 mg by mouth 2 times daily as needed.  ketorolac (TORADOL) 60 MG/2ML injection Inject 2 mLs into the muscle once for 1 dose 2 mL 0    ketorolac (TORADOL) 10 MG tablet Take 1 tablet by mouth every 6 hours as needed for Pain 20 tablet 0    SUMAtriptan (IMITREX) 50 MG tablet Take 1 tablet by mouth once as needed for Migraine 9 tablet 0     No current facility-administered medications for this visit.          Patient Active Problem List   Diagnosis    Ovarian cyst    Dyspareunia, female    Labial hypertrophy    Vaginal inclusion cyst    Vulvar skin tag        Review of Systems   Constitutional: Positive for fatigue and unexpected weight change. Negative for activity change, appetite change, chills, diaphoresis and fever. HENT: Positive for congestion, postnasal drip, sinus pressure, sinus pain and sore throat. Negative for ear discharge, nosebleeds, rhinorrhea, sneezing, tinnitus, trouble swallowing and voice change. Coating to tongue   Eyes: Negative for photophobia, pain, discharge, redness, itching and visual disturbance. Respiratory: Positive for cough. Negative for choking, chest tightness, shortness of breath and wheezing. Cardiovascular: Positive for palpitations and leg swelling. Negative for chest pain. Gastrointestinal: Negative for abdominal distention, abdominal pain, constipation, diarrhea and vomiting. Endocrine: Negative for cold intolerance, heat intolerance, polydipsia, polyphagia and polyuria. Genitourinary: Negative for difficulty urinating, dysuria, flank pain, frequency, genital sores and urgency. Musculoskeletal: Negative for arthralgias, back pain, gait problem, joint swelling, myalgias, neck pain and neck stiffness. Skin: Negative for color change, pallor, rash and wound. Allergic/Immunologic: Negative for environmental allergies, food allergies and immunocompromised state. Neurological: Positive for headaches. Negative for dizziness, tremors, seizures, syncope, facial asymmetry, speech difficulty, weakness, light-headedness and numbness. Hematological: Negative for adenopathy. Does not bruise/bleed easily. Psychiatric/Behavioral: Negative for agitation, behavioral problems, confusion, decreased concentration, dysphoric mood, hallucinations, self-injury, sleep disturbance and suicidal ideas. The patient is not nervous/anxious and is not hyperactive.          She does report Judgment and thought content normal.   Nursing note and vitals reviewed. 1. Weight gain    2. Swelling        ASSESSMENT/PLAN:    62-year-old woman here for follow-up    1. Pharyngitis: Z-Tera prescribed. 2. Acute sinusitis: Z-Tera prescribed. Depo-Medrol IM x1.  40 mg given. Medrol Dosepak prescribed. 3.  Thrush: Diflucan prescribed. Patient usually gets a yeast infection with antibiotic therapy. Nystatin swish and swallow also prescribed    4. Heart palpitations: Check TSH, CBC and CMP. Likely related to anxiety as patient is able to calm herself down and get her heart rate under control    5. Edema: Trace edema noted to bilateral lower extremities. Increase Lasix to twice daily for 3 days    Daija was seen today for pharyngitis, edema and tachycardia. Diagnoses and all orders for this visit:    Weight gain  -     TSH without Reflex; Future    Swelling  -     CBC Auto Differential; Future  -     Comprehensive Metabolic Panel; Future    Other orders  -     fluconazole (DIFLUCAN) 100 MG tablet; Take 1 tablet by mouth daily for 3 days  -     Discontinue: methylPREDNISolone acetate (DEPO-MEDROL) 40 MG/ML injection; Inject 1 mL into the muscle once for 1 dose  -     azithromycin (ZITHROMAX) 250 MG tablet; Take 1 tablet by mouth See Admin Instructions for 5 days 500mg on day 1 followed by 250mg on days 2 - 5  -     nystatin (MYCOSTATIN) 221289 UNIT/ML suspension; Take 5 mLs by mouth 4 times daily  -     methylPREDNISolone (MEDROL DOSEPACK) 4 MG tablet; Take by mouth. -     methylPREDNISolone acetate (DEPO-MEDROL) injection 40 mg          Return as scheduled.      Orders Placed This Encounter   Procedures    TSH without Reflex     Standing Status:   Future     Number of Occurrences:   1     Standing Expiration Date:   6/11/2020    CBC Auto Differential     Standing Status:   Future     Number of Occurrences:   1     Standing Expiration Date:   6/11/2020    Comprehensive Metabolic Panel     Standing Status:   Future     Number of Occurrences:   1     Standing Expiration Date:   6/11/2020       Doretha Mckenna MD

## 2019-06-11 NOTE — PATIENT INSTRUCTIONS
Patient Education      Patient Education        Sinusitis: Care Instructions  Your Care Instructions    Sinusitis is an infection of the lining of the sinus cavities in your head. Sinusitis often follows a cold. It causes pain and pressure in your head and face. In most cases, sinusitis gets better on its own in 1 to 2 weeks. But some mild symptoms may last for several weeks. Sometimes antibiotics are needed. Follow-up care is a key part of your treatment and safety. Be sure to make and go to all appointments, and call your doctor if you are having problems. It's also a good idea to know your test results and keep a list of the medicines you take. How can you care for yourself at home? · Take an over-the-counter pain medicine, such as acetaminophen (Tylenol), ibuprofen (Advil, Motrin), or naproxen (Aleve). Read and follow all instructions on the label. · If the doctor prescribed antibiotics, take them as directed. Do not stop taking them just because you feel better. You need to take the full course of antibiotics. · Be careful when taking over-the-counter cold or flu medicines and Tylenol at the same time. Many of these medicines have acetaminophen, which is Tylenol. Read the labels to make sure that you are not taking more than the recommended dose. Too much acetaminophen (Tylenol) can be harmful. · Breathe warm, moist air from a steamy shower, a hot bath, or a sink filled with hot water. Avoid cold, dry air. Using a humidifier in your home may help. Follow the directions for cleaning the machine. · Use saline (saltwater) nasal washes to help keep your nasal passages open and wash out mucus and bacteria. You can buy saline nose drops at a grocery store or drugstore. Or you can make your own at home by adding 1 teaspoon of salt and 1 teaspoon of baking soda to 2 cups of distilled water. If you make your own, fill a bulb syringe with the solution, insert the tip into your nostril, and squeeze gently.  Wilian Diop your nose. · Put a hot, wet towel or a warm gel pack on your face 3 or 4 times a day for 5 to 10 minutes each time. · Try a decongestant nasal spray like oxymetazoline (Afrin). Do not use it for more than 3 days in a row. Using it for more than 3 days can make your congestion worse. When should you call for help? Call your doctor now or seek immediate medical care if:    · You have new or worse swelling or redness in your face or around your eyes.     · You have a new or higher fever.    Watch closely for changes in your health, and be sure to contact your doctor if:    · You have new or worse facial pain.     · The mucus from your nose becomes thicker (like pus) or has new blood in it.     · You are not getting better as expected. Where can you learn more? Go to https://CYP DesignpeLegendary Entertainment.Layer 4 Communications. org and sign in to your Billogram account. Enter E067 in the Free For Kids box to learn more about \"Sinusitis: Care Instructions. \"     If you do not have an account, please click on the \"Sign Up Now\" link. Current as of: October 21, 2018  Content Version: 12.0  © 1263-6285 [employer]. Care instructions adapted under license by [facility] ([facility]). If you have questions about a medical condition or this instruction, always ask your healthcare professional. [address] any warranty or liability for your use of this information. Candidiasis: Care Instructions  Your Care Instructions  Candidiasis (say \"wle-hyt-OG-uh-shaniqua\") is a yeast infection. Yeast normally lives in your body. But it can cause problems if your body's defenses don't work as they should. Some medicines can increase your chance of getting a yeast infection. These include antibiotics, steroids, and cancer drugs. And some diseases like AIDS and diabetes can make you more likely to get yeast infections. There are different types of yeast infections. Tranbraydon Rothlulú is a yeast infection in the mouth.  It usually occurs in people with weak immune systems. It causes white patches inside the mouth and throat. Yeast infections of the skin usually occur in skin folds where the skin stays moist. They cause red, oozing patches on your skin. Babies can get these infections under the diaper. People who often wear gloves can get them on their hands. Many women get vaginal yeast infections. They are most common when women take antibiotics. These infections can cause the vagina to itch and burn. They also cause white discharge that looks like cottage cheese. In rare cases, yeast infects the blood. This can cause serious disease. This kind of infection is treated with medicine given through a needle into a vein (IV). After you start treatment, a yeast infection usually goes away quickly. But if your immune system is weak, the infection may come back. Tell your doctor if you get yeast infections often. Follow-up care is a key part of your treatment and safety. Be sure to make and go to all appointments, and call your doctor if you are having problems. It's also a good idea to know your test results and keep a list of the medicines you take. How can you care for yourself at home? · Take your medicines exactly as prescribed. Call your doctor if you think you are having a problem with your medicine. · Use antibiotics only as directed by your doctor. · Eat yogurt with live cultures. It has bacteria called lactobacillus. It may help prevent some types of yeast infections. · Keep your skin clean and dry. Put powder on moist places. · If you are using a cream or suppository to treat a vaginal yeast infection, don't use condoms or a diaphragm. Use a different type of birth control. · Eat a healthy diet and get regular exercise. This will help keep your immune system strong. When should you call for help? Watch closely for changes in your health, and be sure to contact your doctor if:    · You do not get better as expected.    Where can you learn more? Go to https://[email or web address].[email or web address] [email or web address] and sign in to your Perio Sciences account. Enter Y414 in the Mira Designs box to learn more about \"Candidiasis: Care Instructions. \"     If you do not have an account, please click on the \"Sign Up Now\" link. Current as of: May 14, 2018  Content Version: 12.0  © 7077-3716 Healthwise, Incorporated. Care instructions adapted under license by [facility] ([facility]). If you have questions about a medical condition or this instruction, always ask your healthcare professional. [place] [address] any warranty or liability for your use of this information.

## 2019-07-23 ENCOUNTER — TELEPHONE (OUTPATIENT)
Dept: ADMINISTRATIVE | Age: 39
End: 2019-07-23

## 2019-07-23 DIAGNOSIS — E03.9 HYPOTHYROIDISM, UNSPECIFIED TYPE: Primary | ICD-10-CM

## 2019-07-24 NOTE — TELEPHONE ENCOUNTER
She had a TSH, CBC, and CMP done in June. TSH was borderline high. I don't see orders for any lab. What does she need done prior to August appt?

## 2019-08-01 DIAGNOSIS — E03.9 HYPOTHYROIDISM, UNSPECIFIED TYPE: ICD-10-CM

## 2019-08-01 LAB
T4 FREE: 1 NG/DL (ref 0.9–1.7)
TSH SERPL DL<=0.05 MIU/L-ACNC: 4.58 UIU/ML (ref 0.27–4.2)

## 2019-08-07 ENCOUNTER — OFFICE VISIT (OUTPATIENT)
Dept: INTERNAL MEDICINE | Age: 39
End: 2019-08-07
Payer: MEDICAID

## 2019-08-07 ENCOUNTER — HOSPITAL ENCOUNTER (OUTPATIENT)
Dept: GENERAL RADIOLOGY | Age: 39
Discharge: HOME | End: 2019-08-07
Payer: MEDICAID

## 2019-08-07 VITALS
HEIGHT: 63 IN | DIASTOLIC BLOOD PRESSURE: 80 MMHG | OXYGEN SATURATION: 95 % | SYSTOLIC BLOOD PRESSURE: 118 MMHG | RESPIRATION RATE: 18 BRPM | BODY MASS INDEX: 25.52 KG/M2 | HEART RATE: 69 BPM | WEIGHT: 144 LBS

## 2019-08-07 DIAGNOSIS — M54.2 NECK PAIN: ICD-10-CM

## 2019-08-07 DIAGNOSIS — F41.9 ANXIETY AND DEPRESSION: ICD-10-CM

## 2019-08-07 DIAGNOSIS — R60.9 SWELLING: ICD-10-CM

## 2019-08-07 DIAGNOSIS — R13.10 DYSPHAGIA, UNSPECIFIED TYPE: ICD-10-CM

## 2019-08-07 DIAGNOSIS — L65.9 ALOPECIA: ICD-10-CM

## 2019-08-07 DIAGNOSIS — M54.5 CHRONIC LOW BACK PAIN, UNSPECIFIED BACK PAIN LATERALITY, WITH SCIATICA PRESENCE UNSPECIFIED: ICD-10-CM

## 2019-08-07 DIAGNOSIS — R53.83 OTHER FATIGUE: Primary | ICD-10-CM

## 2019-08-07 DIAGNOSIS — M79.7 FIBROMYALGIA: ICD-10-CM

## 2019-08-07 DIAGNOSIS — B07.9 VIRAL WARTS, UNSPECIFIED TYPE: ICD-10-CM

## 2019-08-07 DIAGNOSIS — G43.809 OTHER MIGRAINE WITHOUT STATUS MIGRAINOSUS, NOT INTRACTABLE: ICD-10-CM

## 2019-08-07 DIAGNOSIS — G89.29 CHRONIC LOW BACK PAIN, UNSPECIFIED BACK PAIN LATERALITY, WITH SCIATICA PRESENCE UNSPECIFIED: ICD-10-CM

## 2019-08-07 DIAGNOSIS — R53.83 OTHER FATIGUE: ICD-10-CM

## 2019-08-07 DIAGNOSIS — F32.A ANXIETY AND DEPRESSION: ICD-10-CM

## 2019-08-07 DIAGNOSIS — L60.3 BRITTLE NAILS: ICD-10-CM

## 2019-08-07 LAB
ALBUMIN SERPL-MCNC: 4.9 G/DL (ref 3.5–5.2)
ALP BLD-CCNC: 62 U/L (ref 35–104)
ALT SERPL-CCNC: 11 U/L (ref 5–33)
ANION GAP SERPL CALCULATED.3IONS-SCNC: 13 MMOL/L (ref 7–19)
AST SERPL-CCNC: 15 U/L (ref 5–32)
BASOPHILS ABSOLUTE: 0.1 K/UL (ref 0–0.2)
BASOPHILS RELATIVE PERCENT: 0.7 % (ref 0–1)
BILIRUB SERPL-MCNC: 0.6 MG/DL (ref 0.2–1.2)
BUN BLDV-MCNC: 5 MG/DL (ref 6–20)
CALCIUM SERPL-MCNC: 9.5 MG/DL (ref 8.6–10)
CHLORIDE BLD-SCNC: 105 MMOL/L (ref 98–111)
CO2: 26 MMOL/L (ref 22–29)
CREAT SERPL-MCNC: 0.6 MG/DL (ref 0.5–0.9)
EOSINOPHILS ABSOLUTE: 0.3 K/UL (ref 0–0.6)
EOSINOPHILS RELATIVE PERCENT: 4.6 % (ref 0–5)
GFR NON-AFRICAN AMERICAN: >60
GLUCOSE BLD-MCNC: 98 MG/DL (ref 74–109)
HCT VFR BLD CALC: 44.1 % (ref 37–47)
HEMOGLOBIN: 14.6 G/DL (ref 12–16)
IRON SATURATION: 40 % (ref 14–50)
IRON: 127 UG/DL (ref 37–145)
LYMPHOCYTES ABSOLUTE: 2.2 K/UL (ref 1.1–4.5)
LYMPHOCYTES RELATIVE PERCENT: 29.2 % (ref 20–40)
MCH RBC QN AUTO: 33.3 PG (ref 27–31)
MCHC RBC AUTO-ENTMCNC: 33.1 G/DL (ref 33–37)
MCV RBC AUTO: 100.5 FL (ref 81–99)
MONOCYTES ABSOLUTE: 0.4 K/UL (ref 0–0.9)
MONOCYTES RELATIVE PERCENT: 5.5 % (ref 0–10)
NEUTROPHILS ABSOLUTE: 4.5 K/UL (ref 1.5–7.5)
NEUTROPHILS RELATIVE PERCENT: 59.9 % (ref 50–65)
PDW BLD-RTO: 13.2 % (ref 11.5–14.5)
PLATELET # BLD: 343 K/UL (ref 130–400)
PMV BLD AUTO: 10.1 FL (ref 9.4–12.3)
POTASSIUM SERPL-SCNC: 3.5 MMOL/L (ref 3.5–5)
PRO-BNP: 31 PG/ML (ref 0–450)
RBC # BLD: 4.39 M/UL (ref 4.2–5.4)
SODIUM BLD-SCNC: 144 MMOL/L (ref 136–145)
TOTAL IRON BINDING CAPACITY: 317 UG/DL (ref 250–400)
TOTAL PROTEIN: 7.8 G/DL (ref 6.6–8.7)
VITAMIN B-12: 407 PG/ML (ref 211–946)
VITAMIN D 25-HYDROXY: 67.5 NG/ML
WBC # BLD: 7.4 K/UL (ref 4.8–10.8)

## 2019-08-07 PROCEDURE — G8427 DOCREV CUR MEDS BY ELIG CLIN: HCPCS | Performed by: INTERNAL MEDICINE

## 2019-08-07 PROCEDURE — 72100 X-RAY EXAM L-S SPINE 2/3 VWS: CPT

## 2019-08-07 PROCEDURE — 72040 X-RAY EXAM NECK SPINE 2-3 VW: CPT

## 2019-08-07 PROCEDURE — G8419 CALC BMI OUT NRM PARAM NOF/U: HCPCS | Performed by: INTERNAL MEDICINE

## 2019-08-07 PROCEDURE — 4004F PT TOBACCO SCREEN RCVD TLK: CPT | Performed by: INTERNAL MEDICINE

## 2019-08-07 PROCEDURE — 99215 OFFICE O/P EST HI 40 MIN: CPT | Performed by: INTERNAL MEDICINE

## 2019-08-08 ASSESSMENT — ENCOUNTER SYMPTOMS
COLOR CHANGE: 0
COUGH: 0
VOICE CHANGE: 0
EYE ITCHING: 0
SHORTNESS OF BREATH: 0
EYE DISCHARGE: 0
ABDOMINAL PAIN: 0
RHINORRHEA: 0
WHEEZING: 0
BACK PAIN: 1
EYE PAIN: 0
EYE REDNESS: 0
PHOTOPHOBIA: 0
SINUS PAIN: 0
TROUBLE SWALLOWING: 0
SINUS PRESSURE: 0
CHOKING: 0
ABDOMINAL DISTENTION: 0
CONSTIPATION: 0
CHEST TIGHTNESS: 0
SORE THROAT: 0
VOMITING: 0
DIARRHEA: 0

## 2019-08-09 ENCOUNTER — PATIENT MESSAGE (OUTPATIENT)
Dept: INTERNAL MEDICINE | Age: 39
End: 2019-08-09

## 2019-08-09 DIAGNOSIS — E06.3 HASHIMOTO'S DISEASE: Primary | ICD-10-CM

## 2019-08-09 LAB — THYROID PEROXIDASE (TPO) ABS: 221.7 IU/ML (ref 0–9)

## 2019-08-09 RX ORDER — LEVOTHYROXINE SODIUM 0.03 MG/1
25 TABLET ORAL DAILY
Qty: 30 TABLET | Refills: 5 | Status: SHIPPED | OUTPATIENT
Start: 2019-08-09 | End: 2020-02-11

## 2019-08-19 ENCOUNTER — HOSPITAL ENCOUNTER (OUTPATIENT)
Dept: ULTRASOUND IMAGING | Age: 39
Discharge: HOME | End: 2019-08-19
Payer: MEDICAID

## 2019-08-19 ENCOUNTER — TELEPHONE (OUTPATIENT)
Dept: INTERNAL MEDICINE | Age: 39
End: 2019-08-19

## 2019-08-19 DIAGNOSIS — M54.2 NECK PAIN: Primary | ICD-10-CM

## 2019-08-19 DIAGNOSIS — E06.3 HASHIMOTO'S DISEASE: ICD-10-CM

## 2019-08-19 PROCEDURE — 76536 US EXAM OF HEAD AND NECK: CPT

## 2019-08-19 NOTE — TELEPHONE ENCOUNTER
Called the patient and let her know the results she voice understood and I put in the order for ENT.

## 2019-08-20 ENCOUNTER — OFFICE VISIT (OUTPATIENT)
Dept: INTERNAL MEDICINE | Age: 39
End: 2019-08-20
Payer: MEDICAID

## 2019-08-20 ENCOUNTER — TELEPHONE (OUTPATIENT)
Dept: INTERNAL MEDICINE | Age: 39
End: 2019-08-20

## 2019-08-20 VITALS
HEIGHT: 63 IN | BODY MASS INDEX: 26.4 KG/M2 | SYSTOLIC BLOOD PRESSURE: 118 MMHG | OXYGEN SATURATION: 97 % | HEART RATE: 65 BPM | DIASTOLIC BLOOD PRESSURE: 80 MMHG | WEIGHT: 149 LBS | RESPIRATION RATE: 18 BRPM

## 2019-08-20 DIAGNOSIS — E06.3 HASHIMOTO'S THYROIDITIS: Primary | ICD-10-CM

## 2019-08-20 DIAGNOSIS — E04.2 MULTIPLE THYROID NODULES: ICD-10-CM

## 2019-08-20 PROCEDURE — 4004F PT TOBACCO SCREEN RCVD TLK: CPT | Performed by: INTERNAL MEDICINE

## 2019-08-20 PROCEDURE — 99213 OFFICE O/P EST LOW 20 MIN: CPT | Performed by: INTERNAL MEDICINE

## 2019-08-20 PROCEDURE — G8427 DOCREV CUR MEDS BY ELIG CLIN: HCPCS | Performed by: INTERNAL MEDICINE

## 2019-08-20 PROCEDURE — G8419 CALC BMI OUT NRM PARAM NOF/U: HCPCS | Performed by: INTERNAL MEDICINE

## 2019-08-20 ASSESSMENT — ENCOUNTER SYMPTOMS
EYE PAIN: 0
BACK PAIN: 1
SORE THROAT: 0
ABDOMINAL PAIN: 0
DIARRHEA: 0
EYE REDNESS: 0
VOICE CHANGE: 0
WHEEZING: 0
SINUS PAIN: 0
VOMITING: 0
EYE ITCHING: 0
TROUBLE SWALLOWING: 1
CHOKING: 0
CONSTIPATION: 0
CHEST TIGHTNESS: 0
SHORTNESS OF BREATH: 0
PHOTOPHOBIA: 0
COUGH: 0
SINUS PRESSURE: 0
EYE DISCHARGE: 0
ABDOMINAL DISTENTION: 0
COLOR CHANGE: 0
RHINORRHEA: 0

## 2019-08-20 NOTE — PROGRESS NOTES
Chief Complaint   Patient presents with    2 Week Follow-Up     Patient is here to follow up on several problems. HPI: Jen Mae is a 35 y.o. female is here for up of fatigue. She was recently diagnosed with Hashimoto's disease. She recently started her Synthroid. We have a referral into endocrinology, which she is yet to hear from. She also had a thyroid ultrasound. Did show 3 thyroid nodules. Referral to ENT is currently pending. She still very fatigued. She still losing hair. She still feels very tired all the time. Sometimes she has difficulty swallowing. However, she is somewhat relieved to find that there is possibly a diagnosis for some of her issues, rather than just fibromyalgia. She has no other concerns or complaints today.     Past Medical History:   Diagnosis Date    Fibromyalgia     Fluid retention     H/O Raynaud's syndrome     Heartburn     Labial cyst     sebaceous cyst and skin tag    Spastic colon     Vitamin D deficiency       Past Surgical History:   Procedure Laterality Date    APPENDECTOMY      CHOLECYSTECTOMY      COLONOSCOPY  2010    OVARY REMOVAL      left    [place] LYSIS OF LABIAL ADHESIONS N/A 2/16/2017    INCISION AND DRAINAGE OF SEBACEOUS CYST ON LABIA; SKIN TAG REMOVAL OF PERINEUM  performed by Osorio Degroot MD at  [address]      TYMPANOSTOMY TUBE PLACEMENT      UPPER GASTROINTESTINAL ENDOSCOPY      VULVA SURGERY N/A 2/16/2017    LABIALPLASTY  performed by Osorio [facility]root MD at [facility] [place]      Social History     Socioeconomic History    Marital status:      Spouse name: None    Number of children: None    Years of education: None    Highest education level: None   Occupational History    None   Social Needs    Financial resource strain: None    Food insecurity:     Worry: None     Inability: None    Transportation needs:     Medical: None     Non-medical: None   Tobacco Use    Smoking status: Current Every Day Smoker BY MOUTH TWICE A DAY (Patient taking differently: 2 times daily as needed ) 60 tablet 2    DULoxetine (CYMBALTA) 60 MG extended release capsule Take 60 mg by mouth daily      busPIRone (BUSPAR) 5 MG tablet Take 5 mg by mouth 2 times daily       furosemide (LASIX) 20 MG tablet Take 20 mg by mouth daily       rizatriptan (MAXALT) 5 MG tablet Take 5 mg by mouth once as needed for Migraine       famotidine (PEPCID) 20 MG tablet Take 20 mg by mouth 2 times daily as needed.  ketorolac (TORADOL) 60 MG/2ML injection Inject 2 mLs into the muscle once for 1 dose 2 mL 0     No current facility-administered medications for this visit. Patient Active Problem List   Diagnosis    Ovarian cyst    Dyspareunia, female    Labial hypertrophy    Vaginal inclusion cyst    Vulvar skin tag        Review of Systems   Constitutional: Positive for fatigue, fever and unexpected weight change. Negative for activity change, appetite change, chills and diaphoresis. Low grade fever   HENT: Positive for trouble swallowing. Negative for congestion, ear discharge, nosebleeds, postnasal drip, rhinorrhea, sinus pressure, sinus pain, sneezing, sore throat, tinnitus and voice change. Eyes: Negative for photophobia, pain, discharge, redness, itching and visual disturbance. Respiratory: Negative for cough, choking, chest tightness, shortness of breath and wheezing. Cardiovascular: Negative for chest pain, palpitations and leg swelling. Gastrointestinal: Negative for abdominal distention, abdominal pain, constipation, diarrhea and vomiting. Endocrine: Negative for cold intolerance, heat intolerance, polydipsia, polyphagia and polyuria. Genitourinary: Negative for difficulty urinating, dysuria, flank pain, frequency, genital sores and urgency. Musculoskeletal: Positive for back pain and neck pain. Negative for arthralgias, gait problem, joint swelling, myalgias and neck stiffness.    Skin: Negative for color

## 2019-08-20 NOTE — PATIENT INSTRUCTIONS
Patient Education      Patient Education        Hashimoto's Thyroiditis: Care Instructions  Your Care Instructions    Hashimoto's thyroiditis is a problem with the thyroid gland. The thyroid gland, which is in your neck, controls the way your body uses energy. Sometimes the disease causes the gland to make too much thyroid hormone (thyrotoxicosis). This can make you feel nervous, lose weight, and have many loose bowel movements. You may also have a fast heartbeat. But as the disease progresses, the gland usually does not make enough thyroid hormone. This can cause you to feel tired and have dry skin and thinning hair. Most people with Hashimoto's are diagnosed when they have these symptoms. You may need to take medicine if you have symptoms or if your thyroid hormone level is not normal. Most people with Hashimoto's thyroiditis need to take medicine for the rest of their lives. Follow-up care is a key part of your treatment and safety. Be sure to make and go to all appointments, and call your doctor if you are having problems. It's also a good idea to know your test results and keep a list of the medicines you take. How can you care for yourself at home? · Take your medicines exactly as prescribed. Call your doctor if you think you are having a problem with your medicine. You will get more details on the specific medicines your doctor prescribes. When should you call for help? Call 911 anytime you think you may need emergency care.  For example, call if:    · You passed out (lost consciousness).     · You have severe trouble breathing.     · You have a very slow heartbeat (less than 60 beats a minute).     · You have a low body temperature (95°F or below).    Watch closely for changes in your health, and be sure to contact your doctor if:    · You gain weight even though you are eating normally or less than usual.     · You feel extremely weak or tired.     · You have new changes in your skin, nails, or hair,

## 2019-08-21 ENCOUNTER — TELEPHONE (OUTPATIENT)
Dept: OTOLARYNGOLOGY | Age: 39
End: 2019-08-21

## 2019-08-23 RX ORDER — ERGOCALCIFEROL 1.25 MG/1
CAPSULE ORAL
Qty: 4 CAPSULE | Refills: 5 | Status: SHIPPED | OUTPATIENT
Start: 2019-08-23 | End: 2020-02-26

## 2019-08-29 ENCOUNTER — TELEPHONE (OUTPATIENT)
Dept: INTERNAL MEDICINE | Age: 39
End: 2019-08-29

## 2019-09-05 ENCOUNTER — TELEPHONE (OUTPATIENT)
Dept: OBGYN | Age: 39
End: 2019-09-05

## 2019-09-05 DIAGNOSIS — Z12.31 ENCOUNTER FOR SCREENING MAMMOGRAM FOR BREAST CANCER: ICD-10-CM

## 2019-09-05 DIAGNOSIS — R92.8 ABNORMAL MAMMOGRAM: Primary | ICD-10-CM

## 2019-09-11 ENCOUNTER — OFFICE VISIT (OUTPATIENT)
Dept: INTERNAL MEDICINE | Age: 39
End: 2019-09-11
Payer: MEDICAID

## 2019-09-11 VITALS
HEART RATE: 74 BPM | HEIGHT: 63 IN | RESPIRATION RATE: 18 BRPM | OXYGEN SATURATION: 97 % | BODY MASS INDEX: 26.05 KG/M2 | WEIGHT: 147 LBS | DIASTOLIC BLOOD PRESSURE: 72 MMHG | TEMPERATURE: 97.1 F | SYSTOLIC BLOOD PRESSURE: 98 MMHG

## 2019-09-11 DIAGNOSIS — J20.9 ACUTE BRONCHITIS, UNSPECIFIED ORGANISM: ICD-10-CM

## 2019-09-11 DIAGNOSIS — E04.1 THYROID NODULE: ICD-10-CM

## 2019-09-11 DIAGNOSIS — J01.10 ACUTE NON-RECURRENT FRONTAL SINUSITIS: Primary | ICD-10-CM

## 2019-09-11 PROCEDURE — 4004F PT TOBACCO SCREEN RCVD TLK: CPT | Performed by: NURSE PRACTITIONER

## 2019-09-11 PROCEDURE — G8419 CALC BMI OUT NRM PARAM NOF/U: HCPCS | Performed by: NURSE PRACTITIONER

## 2019-09-11 PROCEDURE — G8427 DOCREV CUR MEDS BY ELIG CLIN: HCPCS | Performed by: NURSE PRACTITIONER

## 2019-09-11 PROCEDURE — 99213 OFFICE O/P EST LOW 20 MIN: CPT | Performed by: NURSE PRACTITIONER

## 2019-09-11 RX ORDER — CEFDINIR 300 MG/1
300 CAPSULE ORAL 2 TIMES DAILY
Qty: 14 CAPSULE | Refills: 0 | Status: SHIPPED | OUTPATIENT
Start: 2019-09-11 | End: 2019-09-18

## 2019-09-11 ASSESSMENT — ENCOUNTER SYMPTOMS
SHORTNESS OF BREATH: 0
ABDOMINAL DISTENTION: 0
VOMITING: 0
COLOR CHANGE: 0
CHOKING: 0
DIARRHEA: 0
NAUSEA: 0
ABDOMINAL PAIN: 0
SORE THROAT: 1
STRIDOR: 0
WHEEZING: 0
SINUS PAIN: 1
BLOOD IN STOOL: 0
CONSTIPATION: 0
TROUBLE SWALLOWING: 1
EYE DISCHARGE: 0
EYE ITCHING: 0
COUGH: 1

## 2019-09-11 NOTE — PATIENT INSTRUCTIONS
1. Acute sinusitis  2. Acute bronchitis  Cefdinir 300 twice  a day for 7 days  get 2 doses in today    Saline nasal spray 4 times a day both nares for 7 days  Steroid spray, rhinocort, nasocort, nasonex, flonase twice daily about 5 minutes after the saline   mucinex 1200 mg twice daily for 7 days with plenty of water  Delsym cough syrup;   You can take it 3 times a day    3 thyroid nodules she is in the process of getting biopsies

## 2019-09-19 ENCOUNTER — OFFICE VISIT (OUTPATIENT)
Dept: ENDOCRINOLOGY | Facility: CLINIC | Age: 39
End: 2019-09-19

## 2019-09-19 ENCOUNTER — APPOINTMENT (OUTPATIENT)
Dept: LAB | Facility: HOSPITAL | Age: 39
End: 2019-09-19

## 2019-09-19 VITALS
BODY MASS INDEX: 26.03 KG/M2 | HEIGHT: 63 IN | HEART RATE: 73 BPM | WEIGHT: 146.9 LBS | OXYGEN SATURATION: 98 % | SYSTOLIC BLOOD PRESSURE: 124 MMHG | DIASTOLIC BLOOD PRESSURE: 78 MMHG

## 2019-09-19 DIAGNOSIS — E27.40 ADRENAL HYPOFUNCTION (HCC): ICD-10-CM

## 2019-09-19 DIAGNOSIS — N93.8 DUB (DYSFUNCTIONAL UTERINE BLEEDING): ICD-10-CM

## 2019-09-19 DIAGNOSIS — E03.8 HYPOTHYROIDISM DUE TO HASHIMOTO'S THYROIDITIS: Primary | ICD-10-CM

## 2019-09-19 DIAGNOSIS — E06.3 HYPOTHYROIDISM DUE TO HASHIMOTO'S THYROIDITIS: Primary | ICD-10-CM

## 2019-09-19 PROCEDURE — 82533 TOTAL CORTISOL: CPT | Performed by: INTERNAL MEDICINE

## 2019-09-19 PROCEDURE — 99204 OFFICE O/P NEW MOD 45 MIN: CPT | Performed by: INTERNAL MEDICINE

## 2019-09-19 PROCEDURE — 36415 COLL VENOUS BLD VENIPUNCTURE: CPT | Performed by: INTERNAL MEDICINE

## 2019-09-19 RX ORDER — LEVETIRACETAM 500 MG/1
500 TABLET ORAL 2 TIMES DAILY
COMMUNITY
End: 2020-04-01

## 2019-09-19 RX ORDER — RIZATRIPTAN BENZOATE 5 MG/1
5 TABLET, ORALLY DISINTEGRATING ORAL ONCE AS NEEDED
COMMUNITY

## 2019-09-19 RX ORDER — LEVOTHYROXINE SODIUM 0.03 MG/1
25 TABLET ORAL DAILY
COMMUNITY
End: 2019-09-22

## 2019-09-19 NOTE — PROGRESS NOTES
Leatha Goldman is a 39 y.o. female who presents for  evaluation of   Chief Complaint   Patient presents with   • Thyroid Problem       Referring provider    Yanni Silva MD  [address] [place] [address] [place] [address]    Primary Care Provider    Yanni Silva MD    39 year old with PMHx significant for fibromyalgia, depression , anxiety is referred for hypothyroidism secondary to Hashimoto's     Duration, Dx in July 2019     Symptoms, Leg Swelling, GERD , 20 lb weight gain , increased fatigue - different from fibromyalgia, headaches    Alleviating, levothyroxine 25 mcgs daily since August 2019     Severity, mild since no complications                 Past Medical History:   Diagnosis Date   • Depression    • Dyspnea    • Edema    • Epigastric abdominal pain    • Fatigue    • Fibromyalgia    • GERD (gastroesophageal reflux disease)    • Globus sensation    • Hypertension    • Hypokalemia    • Hypothyroidism    • IBS (irritable bowel syndrome)    • Migraine    • Obesity    • Vitamin D deficiency      No family history on file.  Social History     Tobacco Use   • Smoking status: Current Every Day Smoker     Packs/day: 1.00   • Smokeless tobacco: Never Used   Substance Use Topics   • Alcohol use: Yes     Comment: occasional   • Drug use: No         Current Outpatient Medications:   •  busPIRone (BUSPAR) 15 MG tablet, Take 15 mg by mouth 2 (Two) Times a Day., Disp: , Rfl:   •  dicyclomine (BENTYL) 20 MG tablet, Take 20 mg by mouth Every 6 (Six) Hours. prn, Disp: , Rfl:   •  DULoxetine (CYMBALTA) 60 MG capsule, Take 60 mg by mouth Daily., Disp: , Rfl:   •  famotidine (PEPCID) 20 MG tablet, Take 20 mg by mouth 2 (Two) Times a Day. prn, Disp: , Rfl:   •  furosemide (LASIX) 20 MG tablet, Take 20 mg by mouth Daily., Disp: , Rfl:   •  levETIRAcetam (KEPPRA) 500 MG tablet, Take 500 mg by mouth 2 (Two) Times a Day., Disp: , Rfl:   •  levothyroxine (SYNTHROID, LEVOTHROID) 25 MCG tablet, Take 25 mcg by  mouth Daily., Disp: , Rfl:   •  rizatriptan MLT (MAXALT-MLT) 5 MG disintegrating tablet, Take 5 mg by mouth 1 (One) Time As Needed for Migraine. May repeat in 2 hours if needed, Disp: , Rfl:   •  tiZANidine (ZANAFLEX) 4 MG tablet, Take 4 mg by mouth 2 (Two) Times a Day., Disp: , Rfl:   •  vitamin D (ERGOCALCIFEROL) 25595 UNITS capsule capsule, Take 50,000 Units by mouth 1 (one) time per week., Disp: , Rfl:     Review of Systems    Review of Systems   Constitutional: Positive for fatigue and unexpected weight change. Negative for activity change, appetite change, chills, diaphoresis and fever.   HENT: Positive for trouble swallowing. Negative for congestion, dental problem, drooling, ear discharge, ear pain, facial swelling, mouth sores, postnasal drip, rhinorrhea, sinus pressure, sore throat, tinnitus and voice change.    Eyes: Negative for photophobia, pain, discharge, redness, itching and visual disturbance.   Respiratory: Positive for shortness of breath. Negative for apnea, cough, choking, chest tightness, wheezing and stridor.    Cardiovascular: Positive for palpitations and leg swelling. Negative for chest pain.   Gastrointestinal: Negative for abdominal distention, abdominal pain, constipation, diarrhea, nausea and vomiting.   Endocrine: Positive for cold intolerance. Negative for heat intolerance, polydipsia, polyphagia and polyuria.   Genitourinary: Negative for decreased urine volume, difficulty urinating, dysuria, flank pain, frequency, hematuria and urgency.   Musculoskeletal: Positive for arthralgias and myalgias. Negative for back pain, gait problem, joint swelling, neck pain and neck stiffness.   Skin: Negative for color change, pallor, rash and wound.   Allergic/Immunologic: Negative for immunocompromised state.   Neurological: Positive for weakness. Negative for dizziness, tremors, seizures, syncope, facial asymmetry, speech difficulty, light-headedness, numbness and headaches.   Hematological:  "Negative for adenopathy.   Psychiatric/Behavioral: Positive for decreased concentration. Negative for agitation, behavioral problems, confusion, dysphoric mood, hallucinations, self-injury, sleep disturbance and suicidal ideas. The patient is nervous/anxious. The patient is not hyperactive.         Objective:   /78   Pulse 73   Ht 160 cm (63\")   Wt 66.6 kg (146 lb 14.4 oz)   SpO2 98%   BMI 26.02 kg/m²     Physical Exam   Constitutional: She is oriented to person, place, and time. She appears well-developed.   HENT:   Head: Normocephalic.   Right Ear: External ear normal.   Left Ear: External ear normal.   Nose: Nose normal.   Eyes: Conjunctivae and EOM are normal. No scleral icterus.   Neck: Normal range of motion. Neck supple. No tracheal deviation present. No thyromegaly present.   Cardiovascular: Normal rate, regular rhythm, normal heart sounds and intact distal pulses. Exam reveals no gallop and no friction rub.   No murmur heard.  Pulmonary/Chest: Effort normal and breath sounds normal. No stridor. No respiratory distress. She has no wheezes. She has no rales. She exhibits no tenderness.   Abdominal: Soft. Bowel sounds are normal. She exhibits no distension and no mass. There is no tenderness. There is no rebound and no guarding.   Musculoskeletal: Normal range of motion. She exhibits no tenderness or deformity.   Lymphadenopathy:     She has no cervical adenopathy.   Neurological: She is alert and oriented to person, place, and time. She displays normal reflexes. She exhibits normal muscle tone. Coordination normal.   Skin: No rash noted. No erythema. No pallor.   Psychiatric: She has a normal mood and affect. Her behavior is normal. Judgment and thought content normal.       Lab Review    Results for orders placed or performed in visit on 09/19/19   TSH   Result Value Ref Range    TSH 4.060 0.270 - 4.200 uIU/mL   T4, Free   Result Value Ref Range    Free T4 1.06 0.93 - 1.70 ng/dL   T3, Free "   Result Value Ref Range    T3, Free 3.02 2.00 - 4.40 pg/mL   Progesterone   Result Value Ref Range    Progesterone 6.39 ng/mL   Estradiol   Result Value Ref Range    Estradiol 90.7 pg/mL   FSH & LH   Result Value Ref Range    FSH 2.95 mIU/mL    LH 7.05 mIU/mL   Cortisol - AM   Result Value Ref Range    Cortisol 19.49   mcg/dL   DHEA-Sulfate   Result Value Ref Range    DHEA-Sulfate 132.6 57.3 - 279.2 ug/dL         Assessment/Plan       ICD-10-CM ICD-9-CM   1. Hypothyroidism due to Hashimoto's thyroiditis E03.8 244.8    E06.3 245.2   2. DUB (dysfunctional uterine bleeding) N93.8 626.8   3. Adrenal hypofunction (CMS/HCC) E27.49 255.41         I reviewed and summarized records from Yanni Silva MD from current year  and I reviewed / ordered labs.   From review of records :    Patient has Hashimoto's     Target TSH should be 0.5 to 2.5. Increase from levothyroxine 25 mcgs daily to 50 mcgs daily     I did explain that symptoms are unlikely or only minimally related to Hashimoto's and Hypotlhyroidism.     --\    DUB    Nl estradiol, progesterone and FSH / LH    --    Unlikely adrenal insufficiency as DHEAS is above 60           Orders Placed This Encounter   Procedures   • TSH   • T4, Free   • T3, Free   • T3, Reverse   • Testosterone, Total, Women & Children   • Progesterone   • Estradiol   • FSH & LH   • ACTH   • Cortisol - AM   • DHEA-Sulfate   • Salivary Cortisol, MS - Saliva, Oral Cavity         A copy of my note was sent to Yanni Silva MD    Please see my above opinion and suggestions.

## 2019-09-20 ENCOUNTER — APPOINTMENT (OUTPATIENT)
Dept: LAB | Facility: HOSPITAL | Age: 39
End: 2019-09-20

## 2019-09-20 LAB
CORTIS SERPL-MCNC: 19.49 MCG/DL
ESTRADIOL SERPL HS-MCNC: 90.7 PG/ML
FSH SERPL-ACNC: 2.95 MIU/ML
LH SERPL-ACNC: 7.05 MIU/ML
PROGEST SERPL-MCNC: 6.39 NG/ML
T3FREE SERPL-MCNC: 3.02 PG/ML (ref 2–4.4)
T4 FREE SERPL-MCNC: 1.06 NG/DL (ref 0.93–1.7)
TSH SERPL DL<=0.05 MIU/L-ACNC: 4.06 UIU/ML (ref 0.27–4.2)

## 2019-09-20 PROCEDURE — 82670 ASSAY OF TOTAL ESTRADIOL: CPT | Performed by: INTERNAL MEDICINE

## 2019-09-20 PROCEDURE — 84481 FREE ASSAY (FT-3): CPT | Performed by: INTERNAL MEDICINE

## 2019-09-20 PROCEDURE — 83001 ASSAY OF GONADOTROPIN (FSH): CPT | Performed by: INTERNAL MEDICINE

## 2019-09-20 PROCEDURE — 82627 DEHYDROEPIANDROSTERONE: CPT | Performed by: INTERNAL MEDICINE

## 2019-09-20 PROCEDURE — 84144 ASSAY OF PROGESTERONE: CPT | Performed by: INTERNAL MEDICINE

## 2019-09-20 PROCEDURE — 82533 TOTAL CORTISOL: CPT | Performed by: INTERNAL MEDICINE

## 2019-09-20 PROCEDURE — 84482 T3 REVERSE: CPT | Performed by: INTERNAL MEDICINE

## 2019-09-20 PROCEDURE — 84403 ASSAY OF TOTAL TESTOSTERONE: CPT | Performed by: INTERNAL MEDICINE

## 2019-09-20 PROCEDURE — 84439 ASSAY OF FREE THYROXINE: CPT | Performed by: INTERNAL MEDICINE

## 2019-09-20 PROCEDURE — 82024 ASSAY OF ACTH: CPT | Performed by: INTERNAL MEDICINE

## 2019-09-20 PROCEDURE — 83002 ASSAY OF GONADOTROPIN (LH): CPT | Performed by: INTERNAL MEDICINE

## 2019-09-20 PROCEDURE — 84443 ASSAY THYROID STIM HORMONE: CPT | Performed by: INTERNAL MEDICINE

## 2019-09-21 LAB — DHEA-S SERPL-MCNC: 132.6 UG/DL (ref 57.3–279.2)

## 2019-09-22 RX ORDER — LEVOTHYROXINE SODIUM 0.05 MG/1
50 TABLET ORAL DAILY
Qty: 30 TABLET | Refills: 11 | Status: SHIPPED | OUTPATIENT
Start: 2019-09-22 | End: 2020-04-01

## 2019-09-23 LAB — ACTH PLAS-MCNC: 22.9 PG/ML (ref 7.2–63.3)

## 2019-09-24 LAB — T3REVERSE SERPL-MCNC: 14.8 NG/DL (ref 9.2–24.1)

## 2019-09-25 LAB — TESTOST SERPL-MCNC: 16.9 NG/DL

## 2019-09-26 LAB — CORTIS SAL-MCNC: 0.04 UG/DL

## 2019-11-05 ENCOUNTER — OFFICE VISIT (OUTPATIENT)
Dept: INTERNAL MEDICINE | Age: 39
End: 2019-11-05
Payer: MEDICAID

## 2019-11-05 VITALS
HEART RATE: 75 BPM | SYSTOLIC BLOOD PRESSURE: 118 MMHG | WEIGHT: 153 LBS | HEIGHT: 63 IN | DIASTOLIC BLOOD PRESSURE: 82 MMHG | BODY MASS INDEX: 27.11 KG/M2 | OXYGEN SATURATION: 97 %

## 2019-11-05 DIAGNOSIS — E03.9 HYPOTHYROIDISM, UNSPECIFIED TYPE: ICD-10-CM

## 2019-11-05 DIAGNOSIS — M77.8 RIGHT ELBOW TENDINITIS: ICD-10-CM

## 2019-11-05 DIAGNOSIS — E55.9 VITAMIN D DEFICIENCY: Primary | ICD-10-CM

## 2019-11-05 DIAGNOSIS — M79.7 FIBROMYALGIA: ICD-10-CM

## 2019-11-05 DIAGNOSIS — G43.809 OTHER MIGRAINE WITHOUT STATUS MIGRAINOSUS, NOT INTRACTABLE: ICD-10-CM

## 2019-11-05 DIAGNOSIS — K21.9 GASTROESOPHAGEAL REFLUX DISEASE WITHOUT ESOPHAGITIS: ICD-10-CM

## 2019-11-05 PROCEDURE — G8427 DOCREV CUR MEDS BY ELIG CLIN: HCPCS | Performed by: INTERNAL MEDICINE

## 2019-11-05 PROCEDURE — G8419 CALC BMI OUT NRM PARAM NOF/U: HCPCS | Performed by: INTERNAL MEDICINE

## 2019-11-05 PROCEDURE — 4004F PT TOBACCO SCREEN RCVD TLK: CPT | Performed by: INTERNAL MEDICINE

## 2019-11-05 PROCEDURE — 99213 OFFICE O/P EST LOW 20 MIN: CPT | Performed by: INTERNAL MEDICINE

## 2019-11-05 PROCEDURE — G8484 FLU IMMUNIZE NO ADMIN: HCPCS | Performed by: INTERNAL MEDICINE

## 2019-11-05 RX ORDER — DICYCLOMINE HCL 20 MG
20 TABLET ORAL 2 TIMES DAILY PRN
Qty: 60 TABLET | Refills: 5 | Status: SHIPPED | OUTPATIENT
Start: 2019-11-05 | End: 2020-04-27

## 2019-11-06 ASSESSMENT — ENCOUNTER SYMPTOMS
PHOTOPHOBIA: 0
CHOKING: 0
BACK PAIN: 0
FACIAL SWELLING: 0
SORE THROAT: 0
EYE DISCHARGE: 0
DIARRHEA: 0
EYE REDNESS: 0
ABDOMINAL PAIN: 0
TROUBLE SWALLOWING: 0
SINUS PRESSURE: 0
EYE ITCHING: 0
RECTAL PAIN: 0
VOICE CHANGE: 0
NAUSEA: 0
CONSTIPATION: 0
ABDOMINAL DISTENTION: 0
COLOR CHANGE: 0
RHINORRHEA: 0
BLOOD IN STOOL: 0
ANAL BLEEDING: 0
VOMITING: 0
SHORTNESS OF BREATH: 0
COUGH: 0
EYE PAIN: 0
WHEEZING: 0
CHEST TIGHTNESS: 0

## 2019-12-20 ENCOUNTER — OFFICE VISIT (OUTPATIENT)
Dept: INTERNAL MEDICINE | Age: 39
End: 2019-12-20
Payer: MEDICAID

## 2019-12-20 VITALS
TEMPERATURE: 98.1 F | SYSTOLIC BLOOD PRESSURE: 95 MMHG | HEART RATE: 70 BPM | OXYGEN SATURATION: 98 % | DIASTOLIC BLOOD PRESSURE: 65 MMHG

## 2019-12-20 DIAGNOSIS — G43.B0 OPHTHALMOPLEGIC MIGRAINE, NOT INTRACTABLE: ICD-10-CM

## 2019-12-20 PROBLEM — E06.3 HYPOTHYROIDISM DUE TO HASHIMOTO'S THYROIDITIS: Status: ACTIVE | Noted: 2019-09-19

## 2019-12-20 PROBLEM — E03.8 HYPOTHYROIDISM DUE TO HASHIMOTO'S THYROIDITIS: Status: ACTIVE | Noted: 2019-09-19

## 2019-12-20 PROCEDURE — G8419 CALC BMI OUT NRM PARAM NOF/U: HCPCS | Performed by: INTERNAL MEDICINE

## 2019-12-20 PROCEDURE — G8484 FLU IMMUNIZE NO ADMIN: HCPCS | Performed by: INTERNAL MEDICINE

## 2019-12-20 PROCEDURE — G8427 DOCREV CUR MEDS BY ELIG CLIN: HCPCS | Performed by: INTERNAL MEDICINE

## 2019-12-20 PROCEDURE — 4004F PT TOBACCO SCREEN RCVD TLK: CPT | Performed by: INTERNAL MEDICINE

## 2019-12-20 PROCEDURE — 96372 THER/PROPH/DIAG INJ SC/IM: CPT | Performed by: INTERNAL MEDICINE

## 2019-12-20 PROCEDURE — 99213 OFFICE O/P EST LOW 20 MIN: CPT | Performed by: INTERNAL MEDICINE

## 2019-12-20 RX ORDER — KETOROLAC TROMETHAMINE 30 MG/ML
60 INJECTION, SOLUTION INTRAMUSCULAR; INTRAVENOUS ONCE
Status: COMPLETED | OUTPATIENT
Start: 2019-12-20 | End: 2019-12-20

## 2019-12-20 RX ORDER — TRAMADOL HYDROCHLORIDE 50 MG/1
50 TABLET ORAL EVERY 6 HOURS PRN
Qty: 10 TABLET | Refills: 0 | Status: SHIPPED | OUTPATIENT
Start: 2019-12-20 | End: 2019-12-25

## 2019-12-20 RX ADMIN — KETOROLAC TROMETHAMINE 60 MG: 30 INJECTION, SOLUTION INTRAMUSCULAR; INTRAVENOUS at 12:11

## 2019-12-20 ASSESSMENT — ENCOUNTER SYMPTOMS
SHORTNESS OF BREATH: 0
VOMITING: 0
TROUBLE SWALLOWING: 0
EYE DISCHARGE: 0
SORE THROAT: 0
EYE ITCHING: 0
BACK PAIN: 0
SINUS PRESSURE: 0
ABDOMINAL DISTENTION: 0
NAUSEA: 0
ABDOMINAL PAIN: 0
BLOOD IN STOOL: 0
WHEEZING: 0

## 2019-12-30 ENCOUNTER — TELEPHONE (OUTPATIENT)
Dept: INTERNAL MEDICINE | Age: 39
End: 2019-12-30

## 2019-12-30 DIAGNOSIS — G43.B0 OPHTHALMOPLEGIC MIGRAINE, NOT INTRACTABLE: Primary | ICD-10-CM

## 2019-12-31 ENCOUNTER — TELEPHONE (OUTPATIENT)
Dept: NEUROLOGY | Age: 39
End: 2019-12-31

## 2020-02-04 DIAGNOSIS — M79.7 FIBROMYALGIA: ICD-10-CM

## 2020-02-04 LAB
ALBUMIN SERPL-MCNC: 4.4 G/DL (ref 3.5–5.2)
ALP BLD-CCNC: 66 U/L (ref 35–104)
ALT SERPL-CCNC: 8 U/L (ref 5–33)
ANION GAP SERPL CALCULATED.3IONS-SCNC: 14 MMOL/L (ref 7–19)
AST SERPL-CCNC: 11 U/L (ref 5–32)
BASOPHILS ABSOLUTE: 0.1 K/UL (ref 0–0.2)
BASOPHILS RELATIVE PERCENT: 0.6 % (ref 0–1)
BILIRUB SERPL-MCNC: 0.4 MG/DL (ref 0.2–1.2)
BUN BLDV-MCNC: 13 MG/DL (ref 6–20)
CALCIUM SERPL-MCNC: 9.2 MG/DL (ref 8.6–10)
CHLORIDE BLD-SCNC: 103 MMOL/L (ref 98–111)
CHOLESTEROL, TOTAL: 166 MG/DL (ref 160–199)
CO2: 23 MMOL/L (ref 22–29)
CREAT SERPL-MCNC: 0.7 MG/DL (ref 0.5–0.9)
EOSINOPHILS ABSOLUTE: 0.5 K/UL (ref 0–0.6)
EOSINOPHILS RELATIVE PERCENT: 6.3 % (ref 0–5)
GFR NON-AFRICAN AMERICAN: >60
GLUCOSE BLD-MCNC: 89 MG/DL (ref 74–109)
HCT VFR BLD CALC: 42.6 % (ref 37–47)
HDLC SERPL-MCNC: 53 MG/DL (ref 65–121)
HEMOGLOBIN: 13.9 G/DL (ref 12–16)
IMMATURE GRANULOCYTES #: 0 K/UL
LDL CHOLESTEROL CALCULATED: 100 MG/DL
LYMPHOCYTES ABSOLUTE: 2.9 K/UL (ref 1.1–4.5)
LYMPHOCYTES RELATIVE PERCENT: 33.8 % (ref 20–40)
MCH RBC QN AUTO: 32.4 PG (ref 27–31)
MCHC RBC AUTO-ENTMCNC: 32.6 G/DL (ref 33–37)
MCV RBC AUTO: 99.3 FL (ref 81–99)
MONOCYTES ABSOLUTE: 0.5 K/UL (ref 0–0.9)
MONOCYTES RELATIVE PERCENT: 6.3 % (ref 0–10)
NEUTROPHILS ABSOLUTE: 4.5 K/UL (ref 1.5–7.5)
NEUTROPHILS RELATIVE PERCENT: 52.5 % (ref 50–65)
PDW BLD-RTO: 13.1 % (ref 11.5–14.5)
PLATELET # BLD: 322 K/UL (ref 130–400)
PMV BLD AUTO: 10.1 FL (ref 9.4–12.3)
POTASSIUM SERPL-SCNC: 3.8 MMOL/L (ref 3.5–5)
RBC # BLD: 4.29 M/UL (ref 4.2–5.4)
SODIUM BLD-SCNC: 140 MMOL/L (ref 136–145)
TOTAL PROTEIN: 6.8 G/DL (ref 6.6–8.7)
TRIGL SERPL-MCNC: 66 MG/DL (ref 0–149)
TSH SERPL DL<=0.05 MIU/L-ACNC: 4.27 UIU/ML (ref 0.27–4.2)
VITAMIN D 25-HYDROXY: 42.1 NG/ML
WBC # BLD: 8.6 K/UL (ref 4.8–10.8)

## 2020-02-11 ENCOUNTER — OFFICE VISIT (OUTPATIENT)
Dept: INTERNAL MEDICINE | Age: 40
End: 2020-02-11
Payer: MEDICAID

## 2020-02-11 VITALS
DIASTOLIC BLOOD PRESSURE: 75 MMHG | BODY MASS INDEX: 26.49 KG/M2 | SYSTOLIC BLOOD PRESSURE: 110 MMHG | HEIGHT: 63 IN | HEART RATE: 72 BPM | RESPIRATION RATE: 18 BRPM | OXYGEN SATURATION: 95 % | WEIGHT: 149.5 LBS

## 2020-02-11 PROCEDURE — G8484 FLU IMMUNIZE NO ADMIN: HCPCS | Performed by: INTERNAL MEDICINE

## 2020-02-11 PROCEDURE — 99396 PREV VISIT EST AGE 40-64: CPT | Performed by: INTERNAL MEDICINE

## 2020-02-11 RX ORDER — LEVOTHYROXINE SODIUM 0.05 MG/1
50 TABLET ORAL DAILY
COMMUNITY
Start: 2020-01-25 | End: 2020-02-11 | Stop reason: SDUPTHER

## 2020-02-11 RX ORDER — LEVOTHYROXINE SODIUM 0.07 MG/1
75 TABLET ORAL DAILY
Qty: 90 TABLET | Refills: 2 | Status: SHIPPED | OUTPATIENT
Start: 2020-02-11 | End: 2020-10-19

## 2020-02-11 RX ORDER — FAMOTIDINE 20 MG/1
20 TABLET, FILM COATED ORAL 2 TIMES DAILY PRN
Qty: 180 TABLET | Refills: 3 | Status: SHIPPED | OUTPATIENT
Start: 2020-02-11 | End: 2021-02-22 | Stop reason: SDUPTHER

## 2020-02-11 RX ORDER — RIZATRIPTAN BENZOATE 5 MG/1
5 TABLET ORAL
Qty: 30 TABLET | Refills: 0 | Status: SHIPPED | OUTPATIENT
Start: 2020-02-11 | End: 2020-07-06

## 2020-02-11 ASSESSMENT — PATIENT HEALTH QUESTIONNAIRE - PHQ9
2. FEELING DOWN, DEPRESSED OR HOPELESS: 1
SUM OF ALL RESPONSES TO PHQ QUESTIONS 1-9: 2
1. LITTLE INTEREST OR PLEASURE IN DOING THINGS: 1
SUM OF ALL RESPONSES TO PHQ QUESTIONS 1-9: 2
SUM OF ALL RESPONSES TO PHQ9 QUESTIONS 1 & 2: 2

## 2020-02-11 NOTE — PATIENT INSTRUCTIONS
depression. · Learn about fibromyalgia. This makes coping easier. Then, take an active role in your treatment. · Think about joining a support group with others who have fibromyalgia to learn more and get support. When should you call for help? Watch closely for changes in your health, and be sure to contact your doctor if:    · You feel sad, helpless, or hopeless; lose interest in things you used to enjoy; or have other symptoms of depression.     · Your fibromyalgia symptoms get worse. Where can you learn more? Go to [email or web address] and sign in to your [employer] account. Enter V003 in the [employer] box to learn more about \"Fibromyalgia: Care Instructions. \"     If you do not have an account, please click on the \"Sign Up Now\" link. Current as of: March 28, 2019  Content Version: 12.3  © 9779-9457 Healthwise, Incorporated. Care instructions adapted under license by [facility] ([facility]). If you have questions about a medical condition or this instruction, always ask your healthcare professional. [address] any warranty or liability for your use of this information.

## 2020-02-11 NOTE — PROGRESS NOTES
Chief Complaint   Patient presents with    Annual Exam    Hypothyroidism       HPI: Judi Clements is a 35 y.o. female is here for her annual physical exam.  Her functional status is good. She denies any history of falls. She has no concerns in regards to safety, hearing, or cognition. She had been seeing Dr. Precious Galvez for her Hashimoto's thyroiditis. However, she has only seen him on one occasion. She states that her appointments have been changed with Dr. Precious Galvez on 3 occasions. She would like a referral to a different endocrinologist.  She sees Dr. Neil Ortega for her history of fibromyalgia, which is relatively stable. She does seem to hurt and ache all over. Her medications do help somewhat. She is on Keppra, and Zanaflex, and Cymbalta for her fibromyalgia. Cymbalta is also somewhat helpful for depression. BuSpar does help with anxiety. She does complain of some lower extremity swelling. She is on Lasix for this. I do not see any swelling today. However, she states that she is swollen all over. She also sees Dr. Jessica Quiroga for history of migraines. She is averaging about 16-19 migraines per month. She does have a follow-up with Dr. Jessica Quiroga within the next week. Her TSH is elevated. We will increase her levothyroxine to 75 mcg p.o. daily. Her acid reflux is well controlled. Bentyl does help when she has abdominal spasms, but she has not had to use this recently.     Routine screening is as follows:  Eye exam yearly  Pap per Dr. 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 Left Ear: Tympanic membrane, ear canal and external ear normal. There is no impacted cerumen. Nose: Nose normal. No congestion or rhinorrhea. Mouth/Throat:      Mouth: Mucous membranes are moist.      Pharynx: Oropharynx is clear. No oropharyngeal exudate or posterior oropharyngeal erythema. Eyes:      General: No scleral icterus. Right eye: No discharge. Left eye: No discharge. Extraocular Movements: Extraocular movements intact. Conjunctiva/sclera: Conjunctivae normal.      Pupils: Pupils are equal, round, and reactive to light. Neck:      Musculoskeletal: Normal range of motion and neck supple. No neck rigidity or muscular tenderness. Thyroid: No thyromegaly. Vascular: No carotid bruit or JVD. Trachea: No tracheal deviation. Cardiovascular:      Rate and Rhythm: Normal rate and regular rhythm. Pulses: Normal pulses. Heart sounds: Normal heart sounds. No murmur. No friction rub. No gallop. Pulmonary:      Effort: Pulmonary effort is normal. No respiratory distress. Breath sounds: Normal breath sounds. No stridor. No wheezing, rhonchi or rales. Chest:      Chest wall: No tenderness. Abdominal:      General: Bowel sounds are normal. There is no distension. Palpations: Abdomen is soft. There is no mass. Tenderness: There is no abdominal tenderness. There is no right CVA tenderness, left CVA tenderness, guarding or rebound. Hernia: No hernia is present. Musculoskeletal: Normal range of motion. General: No swelling, tenderness, deformity or signs of injury. Right lower leg: No edema. Left lower leg: No edema. Lymphadenopathy:      Cervical: No cervical adenopathy. Skin:     General: Skin is warm and dry. Capillary Refill: Capillary refill takes less than 2 seconds. Coloration: Skin is not jaundiced or pale. Findings: No bruising, erythema, lesion or rash. Neurological:      General: No focal deficit present. Mental Status: She is alert and oriented to person, place, and time. Mental status is at baseline. Cranial Nerves: No cranial nerve deficit. Sensory: No sensory deficit. Motor: No weakness or abnormal muscle tone. Coordination: Coordination normal.      Gait: Gait normal.      Deep Tendon Reflexes: Reflexes are normal and symmetric. Reflexes normal.   Psychiatric:         Mood and Affect: Mood normal.         Behavior: Behavior normal.         Thought Content: Thought content normal.         Judgment: Judgment normal.         1. Hashimoto's thyroiditis        ASSESSMENT/PLAN:    1. Health maintenance: Routine screenings as per HPI. Labs discussed with patient today    2. Hypothyroidism: Increase her Synthroid 75 mcg p.o. daily. Refer to Dr. Lauren Reina for her Hashimoto's thyroiditis. check TSH T4 in a month    3. Migraines: Has an upcoming appointment with Dr. Ceja  next week. Continue Maxalt as needed    4. Acid reflux: Continue Pepcid as prescribed    5. Abdominal cramping: Continue Bentyl    6. Vitamin D deficiency: Continue ergocalciferol    7. Fibromyalgia: Relatively stable on a regimen of Keppra, Zanaflex, Cymbalta    8. Environmental allergies: Stable on Astelin    9. Depression: Continue Cymbalta    10. Anxiety: Stable on BuSpar    11. Lower extremity swelling: No swelling noted today. Continue Lasix as prescribed    Daija was seen today for annual exam and hypothyroidism. Diagnoses and all orders for this visit:    Hashimoto's thyroiditis  -     External Referral To Endocrinology  -     TSH without Reflex; Future  -     T4, Free; Future  -     Thyroid Peroxidase Antibody; Future  -     CBC Auto Differential; Future  -     Comprehensive Metabolic Panel; Future  -     Lipid Panel; Future  -     TSH without Reflex; Future  -     Vitamin D 25 Hydroxy; Future    Other orders  -     rizatriptan (MAXALT) 5 MG tablet;  Take 1 tablet by mouth once as needed for Migraine  -     famotidine (PEPCID) 20 MG tablet; Take 1 tablet by mouth 2 times daily as needed (reflux)  -     levothyroxine (SYNTHROID) 75 MCG tablet; Take 1 tablet by mouth Daily          Return in about 6 months (around 8/11/2020) for hypertension, hypothyroidism.      Orders Placed This Encounter   Procedures    TSH without Reflex     Standing Status:   Future     Standing Expiration Date:   2/11/2021    T4, Free     Standing Status:   Future     Standing Expiration Date:   2/10/2021    Thyroid Peroxidase Antibody     Standing Status:   Future     Standing Expiration Date:   2/11/2021    CBC Auto Differential     Fast 12 hours     Standing Status:   Future     Standing Expiration Date:   2/10/2021    Comprehensive Metabolic Panel     Fasting 12 hours     Standing Status:   Future     Standing Expiration Date:   2/10/2021    Lipid Panel     Standing Status:   Future     Standing Expiration Date:   2/10/2021     Order Specific Question:   Is Patient Fasting?/# of Hours     Answer:   12    TSH without Reflex     Fast 12 hours     Standing Status:   Future     Standing Expiration Date:   2/10/2021    Vitamin D 25 Hydroxy     Standing Status:   Future     Standing Expiration Date:   2/11/2021    External Referral To Endocrinology     Referral Priority:   Routine     Referral Type:   Eval and Treat     Referral Reason:   Specialty Services Required     Requested Specialty:   Endocrinology     Number of Visits Requested:   1       Alesia Ruth MD

## 2020-02-12 ASSESSMENT — ENCOUNTER SYMPTOMS
EYE DISCHARGE: 0
SHORTNESS OF BREATH: 0
RECTAL PAIN: 0
BACK PAIN: 0
FACIAL SWELLING: 0
VOICE CHANGE: 0
COUGH: 0
EYE PAIN: 0
EYE ITCHING: 0
WHEEZING: 0
RHINORRHEA: 0
TROUBLE SWALLOWING: 0
COLOR CHANGE: 0
ANAL BLEEDING: 0
CHOKING: 0
CHEST TIGHTNESS: 0
DIARRHEA: 0
EYE REDNESS: 0
VOMITING: 0
SORE THROAT: 0
SINUS PRESSURE: 0
BLOOD IN STOOL: 0
ABDOMINAL DISTENTION: 0
NAUSEA: 0
CONSTIPATION: 0
PHOTOPHOBIA: 0
ABDOMINAL PAIN: 0

## 2020-02-18 ENCOUNTER — OFFICE VISIT (OUTPATIENT)
Dept: NEUROLOGY | Age: 40
End: 2020-02-18
Payer: MEDICAID

## 2020-02-18 VITALS
HEIGHT: 63 IN | SYSTOLIC BLOOD PRESSURE: 113 MMHG | HEART RATE: 69 BPM | DIASTOLIC BLOOD PRESSURE: 81 MMHG | WEIGHT: 149 LBS | BODY MASS INDEX: 26.4 KG/M2

## 2020-02-18 PROBLEM — H53.149 PHOTOPHOBIA: Status: ACTIVE | Noted: 2020-02-18

## 2020-02-18 PROBLEM — G43.909 MIGRAINE WITHOUT STATUS MIGRAINOSUS, NOT INTRACTABLE: Status: ACTIVE | Noted: 2019-12-20

## 2020-02-18 PROBLEM — M79.7 FIBROMYALGIA: Status: ACTIVE | Noted: 2020-02-18

## 2020-02-18 PROBLEM — M54.2 PAIN, NECK: Status: ACTIVE | Noted: 2020-02-18

## 2020-02-18 PROCEDURE — 99204 OFFICE O/P NEW MOD 45 MIN: CPT | Performed by: PSYCHIATRY & NEUROLOGY

## 2020-02-18 PROCEDURE — 4004F PT TOBACCO SCREEN RCVD TLK: CPT | Performed by: PSYCHIATRY & NEUROLOGY

## 2020-02-18 PROCEDURE — G8427 DOCREV CUR MEDS BY ELIG CLIN: HCPCS | Performed by: PSYCHIATRY & NEUROLOGY

## 2020-02-18 PROCEDURE — G8484 FLU IMMUNIZE NO ADMIN: HCPCS | Performed by: PSYCHIATRY & NEUROLOGY

## 2020-02-18 PROCEDURE — G8419 CALC BMI OUT NRM PARAM NOF/U: HCPCS | Performed by: PSYCHIATRY & NEUROLOGY

## 2020-02-18 RX ORDER — FROVATRIPTAN SUCCINATE 2.5 MG/1
2.5 TABLET, FILM COATED ORAL PRN
Qty: 9 TABLET | Refills: 5 | Status: SHIPPED | OUTPATIENT
Start: 2020-02-18 | End: 2020-07-06

## 2020-02-18 RX ORDER — SUMATRIPTAN AND NAPROXEN SODIUM 85; 500 MG/1; MG/1
1 TABLET, FILM COATED ORAL ONCE
Qty: 9 TABLET | Refills: 5 | Status: SHIPPED | OUTPATIENT
Start: 2020-02-18 | End: 2020-07-06

## 2020-02-18 RX ORDER — ZOLMITRIPTAN 5 MG/1
5 TABLET, FILM COATED ORAL
Qty: 9 TABLET | Refills: 5 | Status: SHIPPED | OUTPATIENT
Start: 2020-02-18 | End: 2021-05-12 | Stop reason: CLARIF

## 2020-02-18 RX ORDER — NARATRIPTAN 2.5 MG/1
2.5 TABLET ORAL SEE ADMIN INSTRUCTIONS
Qty: 9 TABLET | Refills: 5 | Status: SHIPPED | OUTPATIENT
Start: 2020-02-18 | End: 2021-02-22 | Stop reason: SDUPTHER
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
## 2020-02-18 NOTE — PROGRESS NOTES
Review of Systems    Constitutional - yes fever or chills. yes diaphoresis or significant fatigue. HENT -  yes tinnitus or significant hearing loss. Eyes - yes sudden vision change or eye pain  Respiratory - yes significant shortness of breath or cough  Cardiovascular - yes chest pain No palpitations or significant leg swelling  Gastrointestinal - yes abdominal swelling or pain. Genitourinary - yes difficulty urinating, dysuria  Musculoskeletal - yes back pain or myalgia. Skin - yes color change or rash  Neurologic - No seizures. No lateralizing weakness. Hematologic - yes easy bruising or excessive bleeding. Psychiatric - no severe anxiety or nervousness. All other review of systems are negative.

## 2020-02-20 ENCOUNTER — TELEPHONE (OUTPATIENT)
Dept: NEUROLOGY | Age: 40
End: 2020-02-20

## 2020-02-26 RX ORDER — ERGOCALCIFEROL 1.25 MG/1
CAPSULE ORAL
Qty: 4 CAPSULE | Refills: 5 | Status: SHIPPED | OUTPATIENT
Start: 2020-02-26 | End: 2021-03-19

## 2020-03-13 DIAGNOSIS — E06.3 HASHIMOTO'S THYROIDITIS: ICD-10-CM

## 2020-03-13 LAB
BASOPHILS ABSOLUTE: 0.1 K/UL (ref 0–0.2)
BASOPHILS RELATIVE PERCENT: 0.6 % (ref 0–1)
EOSINOPHILS ABSOLUTE: 0.4 K/UL (ref 0–0.6)
EOSINOPHILS RELATIVE PERCENT: 5.3 % (ref 0–5)
HCT VFR BLD CALC: 46.2 % (ref 37–47)
HEMOGLOBIN: 15 G/DL (ref 12–16)
IMMATURE GRANULOCYTES #: 0 K/UL
LYMPHOCYTES ABSOLUTE: 2.8 K/UL (ref 1.1–4.5)
LYMPHOCYTES RELATIVE PERCENT: 33.7 % (ref 20–40)
MCH RBC QN AUTO: 32.4 PG (ref 27–31)
MCHC RBC AUTO-ENTMCNC: 32.5 G/DL (ref 33–37)
MCV RBC AUTO: 99.8 FL (ref 81–99)
MONOCYTES ABSOLUTE: 0.6 K/UL (ref 0–0.9)
MONOCYTES RELATIVE PERCENT: 7.4 % (ref 0–10)
NEUTROPHILS ABSOLUTE: 4.4 K/UL (ref 1.5–7.5)
NEUTROPHILS RELATIVE PERCENT: 52.9 % (ref 50–65)
PDW BLD-RTO: 12.9 % (ref 11.5–14.5)
PLATELET # BLD: 327 K/UL (ref 130–400)
PMV BLD AUTO: 10.1 FL (ref 9.4–12.3)
RBC # BLD: 4.63 M/UL (ref 4.2–5.4)
T4 FREE: 1.29 NG/DL (ref 0.93–1.7)
TSH SERPL DL<=0.05 MIU/L-ACNC: 2.7 UIU/ML (ref 0.27–4.2)
WBC # BLD: 8.3 K/UL (ref 4.8–10.8)

## 2020-03-14 LAB — THYROID PEROXIDASE (TPO) ABS: 409.1 IU/ML (ref 0–9)

## 2020-04-01 ENCOUNTER — OFFICE VISIT (OUTPATIENT)
Dept: ENDOCRINOLOGY | Facility: CLINIC | Age: 40
End: 2020-04-01

## 2020-04-01 DIAGNOSIS — E55.9 VITAMIN D DEFICIENCY: ICD-10-CM

## 2020-04-01 DIAGNOSIS — E06.3 HYPOTHYROIDISM DUE TO HASHIMOTO'S THYROIDITIS: Primary | ICD-10-CM

## 2020-04-01 DIAGNOSIS — E03.8 HYPOTHYROIDISM DUE TO HASHIMOTO'S THYROIDITIS: Primary | ICD-10-CM

## 2020-04-01 DIAGNOSIS — R79.89 ELEVATED CORTISOL LEVEL: ICD-10-CM

## 2020-04-01 DIAGNOSIS — D75.89 MACROCYTOSIS: ICD-10-CM

## 2020-04-01 PROBLEM — M79.7 FIBROMYALGIA: Status: ACTIVE | Noted: 2020-02-18

## 2020-04-01 PROCEDURE — 99442 PR PHYS/QHP TELEPHONE EVALUATION 11-20 MIN: CPT | Performed by: INTERNAL MEDICINE

## 2020-04-01 RX ORDER — LANOLIN ALCOHOL/MO/W.PET/CERES
400 CREAM (GRAM) TOPICAL DAILY
Qty: 100 TABLET | Refills: 3 | Status: SHIPPED | OUTPATIENT
Start: 2020-04-01 | End: 2021-04-01

## 2020-04-01 RX ORDER — DEXAMETHASONE 1 MG
1 TABLET ORAL ONCE
Qty: 1 TABLET | Refills: 0 | Status: SHIPPED | OUTPATIENT
Start: 2020-04-01 | End: 2020-04-01

## 2020-04-01 RX ORDER — GREEN TEA/HOODIA GORDONII 315-12.5MG
CAPSULE ORAL
Qty: 15 TABLET | Refills: 11 | Status: SHIPPED | OUTPATIENT
Start: 2020-04-01 | End: 2021-01-22

## 2020-04-01 RX ORDER — LEVOTHYROXINE SODIUM 0.07 MG/1
75 TABLET ORAL DAILY
COMMUNITY
Start: 2020-03-10

## 2020-04-01 NOTE — PROGRESS NOTES
Leatha Goldman is a 39 y.o. female who presents for  evaluation of   Chief Complaint   Patient presents with   • Hypothyroidism     This was a telephone encounter  Patient agreed to receive service through telephone as patient is being compliant with social distancing recommendations imparted by CDC and WHO.     Primary Care Provider    Yanni Silva MD    39 year old with PMHx significant for fibromyalgia, depression , anxiety is referred for hypothyroidism secondary to Hashimoto's     Duration, Dx in July 2019     Symptoms, Leg Swelling, GERD , 20 lb weight gain , increased fatigue - different from fibromyalgia, headaches    Alleviating, levothyroxine 25 mcgs daily since August 2019     Severity, mild since no complications                 Past Medical History:   Diagnosis Date   • Depression    • Dyspnea    • Edema    • Epigastric abdominal pain    • Fatigue    • Fibromyalgia    • GERD (gastroesophageal reflux disease)    • Globus sensation    • Hypertension    • Hypokalemia    • Hypothyroidism    • IBS (irritable bowel syndrome)    • Migraine    • Obesity    • Vitamin D deficiency      No family history on file.  Social History     Tobacco Use   • Smoking status: Current Every Day Smoker     Packs/day: 1.00   • Smokeless tobacco: Never Used   Substance Use Topics   • Alcohol use: Yes     Comment: occasional   • Drug use: No         Current Outpatient Medications:   •  busPIRone (BUSPAR) 15 MG tablet, Take 15 mg by mouth 2 (Two) Times a Day., Disp: , Rfl:   •  Cyanocobalamin (B-12) 500 MCG sublingual tablet, 500 mcgs under the tongue every other day, Disp: 15 tablet, Rfl: 11  •  dexamethasone (DECADRON) 1 MG tablet, Take 1 tablet by mouth 1 (One) Time for 1 dose. Take at 11 p.m. The night before cortisol testing, Disp: 1 tablet, Rfl: 0  •  dicyclomine (BENTYL) 20 MG tablet, Take 20 mg by mouth Every 6 (Six) Hours. prn, Disp: , Rfl:   •  DULoxetine (CYMBALTA) 60 MG capsule, Take 60 mg by mouth Daily., Disp:  , Rfl:   •  famotidine (PEPCID) 20 MG tablet, Take 20 mg by mouth 2 (Two) Times a Day. prn, Disp: , Rfl:   •  folic acid (FOLVITE) 400 MCG tablet, Take 1 tablet by mouth Daily., Disp: 100 tablet, Rfl: 3  •  furosemide (LASIX) 20 MG tablet, Take 20 mg by mouth Daily., Disp: , Rfl:   •  levothyroxine (SYNTHROID, LEVOTHROID) 75 MCG tablet, Take 75 mcg by mouth Daily., Disp: , Rfl:   •  rizatriptan MLT (MAXALT-MLT) 5 MG disintegrating tablet, Take 5 mg by mouth 1 (One) Time As Needed for Migraine. May repeat in 2 hours if needed, Disp: , Rfl:   •  tiZANidine (ZANAFLEX) 4 MG tablet, Take 4 mg by mouth 2 (Two) Times a Day., Disp: , Rfl:   •  vitamin D (ERGOCALCIFEROL) 99909 UNITS capsule capsule, Take 50,000 Units by mouth 1 (one) time per week., Disp: , Rfl:     Review of Systems    Review of Systems   Constitutional: Positive for fatigue and unexpected weight change. Negative for activity change, appetite change, chills, diaphoresis and fever.   HENT: Positive for trouble swallowing. Negative for congestion, dental problem, drooling, ear discharge, ear pain, facial swelling, mouth sores, postnasal drip, rhinorrhea, sinus pressure, sore throat, tinnitus and voice change.    Eyes: Negative for photophobia, pain, discharge, redness, itching and visual disturbance.   Respiratory: Positive for shortness of breath. Negative for apnea, cough, choking, chest tightness, wheezing and stridor.    Cardiovascular: Positive for palpitations and leg swelling. Negative for chest pain.   Gastrointestinal: Negative for abdominal distention, abdominal pain, constipation, diarrhea, nausea and vomiting.   Endocrine: Positive for cold intolerance. Negative for heat intolerance, polydipsia, polyphagia and polyuria.   Genitourinary: Negative for decreased urine volume, difficulty urinating, dysuria, flank pain, frequency, hematuria and urgency.   Musculoskeletal: Positive for arthralgias and myalgias. Negative for back pain, gait problem,  joint swelling, neck pain and neck stiffness.   Skin: Negative for color change, pallor, rash and wound.   Allergic/Immunologic: Negative for immunocompromised state.   Neurological: Positive for weakness. Negative for dizziness, tremors, seizures, syncope, facial asymmetry, speech difficulty, light-headedness, numbness and headaches.   Hematological: Negative for adenopathy.   Psychiatric/Behavioral: Positive for decreased concentration. Negative for agitation, behavioral problems, confusion, dysphoric mood, hallucinations, self-injury, sleep disturbance and suicidal ideas. The patient is nervous/anxious. The patient is not hyperactive.         Objective:   There were no vitals taken for this visit.    Physical Exam    Lab Review    Results for orders placed or performed in visit on 09/19/19   TSH   Result Value Ref Range    TSH 4.060 0.270 - 4.200 uIU/mL   T4, Free   Result Value Ref Range    Free T4 1.06 0.93 - 1.70 ng/dL   T3, Free   Result Value Ref Range    T3, Free 3.02 2.00 - 4.40 pg/mL   T3, Reverse   Result Value Ref Range    T3, Reverse 14.8 9.2 - 24.1 ng/dL   Testosterone, Total, Women & Children   Result Value Ref Range    Testosterone, Total 16.9 ng/dL   Progesterone   Result Value Ref Range    Progesterone 6.39 ng/mL   Estradiol   Result Value Ref Range    Estradiol 90.7 pg/mL   FSH & LH   Result Value Ref Range    FSH 2.95 mIU/mL    LH 7.05 mIU/mL   ACTH   Result Value Ref Range    ACTH 22.9 7.2 - 63.3 pg/mL   Cortisol - AM   Result Value Ref Range    Cortisol 19.49   mcg/dL   DHEA-Sulfate   Result Value Ref Range    DHEA-Sulfate 132.6 57.3 - 279.2 ug/dL   Salivary Cortisol, MS - Saliva, Oral Cavity   Result Value Ref Range    Cortisol, Salivary 0.037 ug/dL         Assessment/Plan       ICD-10-CM ICD-9-CM   1. Hypothyroidism due to Hashimoto's thyroiditis E03.8 244.8    E06.3 245.2   2. Macrocytosis D75.89 289.89   3. Elevated cortisol level (CMS/HCC) E27.0 255.3   4. Vitamin D deficiency E55.9 268.9          I reviewed and summarized records from Yanni Silva MD from current year  and I reviewed / ordered labs.   From review of records :    Patient has Hashimoto's     Target TSH should be 0.5 to 2.5.     Started with 25 mcgs daily     Now on 75 mcgs daily     I did explain that symptoms are unlikely or only minimally related to Hashimoto's and Hypotlhyroidism.     --\    DUB    Nl estradiol, progesterone and FSH / LH    --    Unlikely adrenal insufficiency as DHEAS is above 60     Rule out Cushing's     --    Macrocytosis    Check b12 and folic acid  Check MTFR    Orders Placed This Encounter   Procedures   • MTHFR Mutation   • Vitamin B12   • Folate RBC   • Cortisol - AM   • Dexamethasone, Serum     Measure after taking dexamethasone 1 mg at 11 p.m. The night before          A copy of my note was sent to Yanni Silva MD    Please see my above opinion and suggestions.     I spent 17 minutes reviewing patient electronic chart , reviewing medications , past history , active problems.   I provided advice regarding diabetes management, refilled prescriptions , ordered labs and arranged for future appointment.   Patient was advised to contact us if there were any unanswered questions or ongoing concerns.

## 2020-04-02 ENCOUNTER — APPOINTMENT (OUTPATIENT)
Dept: LAB | Facility: HOSPITAL | Age: 40
End: 2020-04-02

## 2020-04-02 LAB
CORTIS SERPL-MCNC: 0.63 MCG/DL
TSH SERPL DL<=0.05 MIU/L-ACNC: 1.98 UIU/ML (ref 0.27–4.2)
VIT B12 BLD-MCNC: 437 PG/ML (ref 211–946)

## 2020-04-02 PROCEDURE — 82533 TOTAL CORTISOL: CPT | Performed by: INTERNAL MEDICINE

## 2020-04-02 PROCEDURE — 85014 HEMATOCRIT: CPT | Performed by: INTERNAL MEDICINE

## 2020-04-02 PROCEDURE — 36415 COLL VENOUS BLD VENIPUNCTURE: CPT | Performed by: INTERNAL MEDICINE

## 2020-04-02 PROCEDURE — 84443 ASSAY THYROID STIM HORMONE: CPT | Performed by: INTERNAL MEDICINE

## 2020-04-02 PROCEDURE — G0480 DRUG TEST DEF 1-7 CLASSES: HCPCS | Performed by: INTERNAL MEDICINE

## 2020-04-02 PROCEDURE — 82747 ASSAY OF FOLIC ACID RBC: CPT | Performed by: INTERNAL MEDICINE

## 2020-04-02 PROCEDURE — 82607 VITAMIN B-12: CPT | Performed by: INTERNAL MEDICINE

## 2020-04-02 PROCEDURE — 81291 MTHFR GENE: CPT | Performed by: INTERNAL MEDICINE

## 2020-04-03 LAB
FOLATE BLD-MCNC: 300 NG/ML
FOLATE RBC-MCNC: 721 NG/ML
HCT VFR BLD AUTO: 41.6 % (ref 34–46.6)

## 2020-04-06 LAB — DEXAMETHASONE SERPL-MCNC: 182 NG/DL

## 2020-04-10 LAB — MTHFR GENE MUT ANL BLD/T: NORMAL

## 2020-04-13 ENCOUNTER — HOSPITAL ENCOUNTER (OUTPATIENT)
Dept: PAIN MANAGEMENT | Age: 40
Discharge: HOME | End: 2020-04-13
Payer: MEDICAID

## 2020-04-13 VITALS
OXYGEN SATURATION: 100 % | DIASTOLIC BLOOD PRESSURE: 72 MMHG | TEMPERATURE: 97.9 F | HEART RATE: 66 BPM | RESPIRATION RATE: 16 BRPM | SYSTOLIC BLOOD PRESSURE: 111 MMHG

## 2020-04-13 PROCEDURE — 6360000002 HC RX W HCPCS

## 2020-04-13 PROCEDURE — 64615 CHEMODENERV MUSC MIGRAINE: CPT

## 2020-04-13 PROCEDURE — 64615 CHEMODENERV MUSC MIGRAINE: CPT | Performed by: PSYCHIATRY & NEUROLOGY

## 2020-04-13 NOTE — PROGRESS NOTES
Procedure:  Level of Consciousness: [x]Alert [x]Oriented []Disoriented []Lethargic  Anxiety Level: [x]Calm []Anxious []Depressed []Other  Skin: [x]Warm [x]Dry []Cool []Moist []Intact []Other  Cardiovascular: []Palpitations: []Never []Occasionally []Frequently  Chest Pain: [x]No []Yes  Respiratory:  [x]Unlabored []Labored []Cough ([] Productive []Unproductive)  HCG Required: [x]No []Yes   Results: []Negative []Positive  Knowledge Level:        [x]Patient/Other verbalized understanding of pre-procedure instructions. []Assessment of post-op care needs (transportation, responsible caregiver)        []Able to discuss health care problems and how to deal with it.   Factors that Affect Teaching:        Language Barrier: [x]No []Yes - why:        Hearing Loss:        [x]No []Yes            Corrective Device:  []Yes []No        Vision Loss:           [x]No []Yes            Corrective Device:  []Yes []No        Memory Loss:       [x]No []Yes            []Short Term []Long Term  Motivational Level:  []Asks Questions                  []Extremely Anxious       [x]Seems Interested               []Seems Uninterested                  []Denies need for Education  Risk for Injury:  [x]Patient oriented to person, place and time  []History of frequent falls/loss of balance  Nutritional:  []Change in appetite   []Weight Gain   []Weight Loss  Functional:  []Requires assistance with ADL's

## 2020-04-27 RX ORDER — DICYCLOMINE HCL 20 MG
20 TABLET ORAL 2 TIMES DAILY PRN
Qty: 60 TABLET | Refills: 5 | Status: SHIPPED | OUTPATIENT
Start: 2020-04-27 | End: 2021-11-19 | Stop reason: SDUPTHER

## 2020-05-23 DIAGNOSIS — E55.9 VITAMIN D DEFICIENCY: ICD-10-CM

## 2020-05-23 DIAGNOSIS — E03.8 HYPOTHYROIDISM DUE TO HASHIMOTO'S THYROIDITIS: Primary | ICD-10-CM

## 2020-05-23 DIAGNOSIS — E04.1 THYROID NODULE: ICD-10-CM

## 2020-05-23 DIAGNOSIS — E06.3 HYPOTHYROIDISM DUE TO HASHIMOTO'S THYROIDITIS: Primary | ICD-10-CM

## 2020-05-26 ENCOUNTER — TELEPHONE (OUTPATIENT)
Dept: ENDOCRINOLOGY | Facility: CLINIC | Age: 40
End: 2020-05-26

## 2020-05-29 ENCOUNTER — HOSPITAL ENCOUNTER (OUTPATIENT)
Dept: ULTRASOUND IMAGING | Facility: HOSPITAL | Age: 40
Discharge: HOME | End: 2020-05-29
Admitting: INTERNAL MEDICINE

## 2020-05-29 ENCOUNTER — APPOINTMENT (OUTPATIENT)
Dept: LAB | Facility: HOSPITAL | Age: 40
End: 2020-05-29

## 2020-05-29 LAB
ALBUMIN SERPL-MCNC: 4.5 G/DL (ref 3.5–5.2)
ALBUMIN/GLOB SERPL: 1.6 G/DL
ALP SERPL-CCNC: 73 U/L (ref 39–117)
ALT SERPL W P-5'-P-CCNC: 14 U/L (ref 1–33)
ANION GAP SERPL CALCULATED.3IONS-SCNC: 12 MMOL/L (ref 5–15)
AST SERPL-CCNC: 16 U/L (ref 1–32)
BASOPHILS # BLD AUTO: 0.04 10*3/MM3 (ref 0–0.2)
BASOPHILS NFR BLD AUTO: 0.5 % (ref 0–1.5)
BILIRUB SERPL-MCNC: 0.5 MG/DL (ref 0.2–1.2)
BUN BLD-MCNC: 8 MG/DL (ref 6–20)
BUN/CREAT SERPL: 12.3 (ref 7–25)
CALCIUM SPEC-SCNC: 9.6 MG/DL (ref 8.6–10.5)
CHLORIDE SERPL-SCNC: 103 MMOL/L (ref 98–107)
CO2 SERPL-SCNC: 26 MMOL/L (ref 22–29)
CREAT BLD-MCNC: 0.65 MG/DL (ref 0.57–1)
DEPRECATED RDW RBC AUTO: 46.9 FL (ref 37–54)
EOSINOPHIL # BLD AUTO: 0.42 10*3/MM3 (ref 0–0.4)
EOSINOPHIL NFR BLD AUTO: 5 % (ref 0.3–6.2)
ERYTHROCYTE [DISTWIDTH] IN BLOOD BY AUTOMATED COUNT: 13.2 % (ref 12.3–15.4)
GFR SERPL CREATININE-BSD FRML MDRD: 101 ML/MIN/1.73
GLOBULIN UR ELPH-MCNC: 2.8 GM/DL
GLUCOSE BLD-MCNC: 88 MG/DL (ref 65–99)
HCT VFR BLD AUTO: 44.4 % (ref 34–46.6)
HGB BLD-MCNC: 14.9 G/DL (ref 12–15.9)
IMM GRANULOCYTES # BLD AUTO: 0.02 10*3/MM3 (ref 0–0.05)
IMM GRANULOCYTES NFR BLD AUTO: 0.2 % (ref 0–0.5)
LYMPHOCYTES # BLD AUTO: 1.95 10*3/MM3 (ref 0.7–3.1)
LYMPHOCYTES NFR BLD AUTO: 23.4 % (ref 19.6–45.3)
MCH RBC QN AUTO: 32.7 PG (ref 26.6–33)
MCHC RBC AUTO-ENTMCNC: 33.6 G/DL (ref 31.5–35.7)
MCV RBC AUTO: 97.4 FL (ref 79–97)
MONOCYTES # BLD AUTO: 0.58 10*3/MM3 (ref 0.1–0.9)
MONOCYTES NFR BLD AUTO: 7 % (ref 5–12)
NEUTROPHILS # BLD AUTO: 5.31 10*3/MM3 (ref 1.7–7)
NEUTROPHILS NFR BLD AUTO: 63.9 % (ref 42.7–76)
NRBC BLD AUTO-RTO: 0 /100 WBC (ref 0–0.2)
PLATELET # BLD AUTO: 367 10*3/MM3 (ref 140–450)
PMV BLD AUTO: 10.4 FL (ref 6–12)
POTASSIUM BLD-SCNC: 4.2 MMOL/L (ref 3.5–5.2)
PROT SERPL-MCNC: 7.3 G/DL (ref 6–8.5)
RBC # BLD AUTO: 4.56 10*6/MM3 (ref 3.77–5.28)
SODIUM BLD-SCNC: 141 MMOL/L (ref 136–145)
WBC NRBC COR # BLD: 8.32 10*3/MM3 (ref 3.4–10.8)

## 2020-05-29 PROCEDURE — 36415 COLL VENOUS BLD VENIPUNCTURE: CPT | Performed by: INTERNAL MEDICINE

## 2020-05-29 PROCEDURE — 76536 US EXAM OF HEAD AND NECK: CPT

## 2020-05-29 PROCEDURE — 80050 GENERAL HEALTH PANEL: CPT | Performed by: INTERNAL MEDICINE

## 2020-05-30 LAB — TSH SERPL DL<=0.05 MIU/L-ACNC: 2.49 UIU/ML (ref 0.27–4.2)

## 2020-06-09 ENCOUNTER — OFFICE VISIT (OUTPATIENT)
Dept: ENDOCRINOLOGY | Facility: CLINIC | Age: 40
End: 2020-06-09

## 2020-06-09 VITALS
DIASTOLIC BLOOD PRESSURE: 70 MMHG | RESPIRATION RATE: 17 BRPM | HEIGHT: 63 IN | BODY MASS INDEX: 27.18 KG/M2 | OXYGEN SATURATION: 98 % | HEART RATE: 72 BPM | SYSTOLIC BLOOD PRESSURE: 110 MMHG | WEIGHT: 153.4 LBS

## 2020-06-09 DIAGNOSIS — E55.9 VITAMIN D DEFICIENCY: ICD-10-CM

## 2020-06-09 DIAGNOSIS — E06.3 HYPOTHYROIDISM DUE TO HASHIMOTO'S THYROIDITIS: Primary | ICD-10-CM

## 2020-06-09 DIAGNOSIS — E03.8 HYPOTHYROIDISM DUE TO HASHIMOTO'S THYROIDITIS: Primary | ICD-10-CM

## 2020-06-09 DIAGNOSIS — E04.1 THYROID NODULE: ICD-10-CM

## 2020-06-09 PROCEDURE — 99214 OFFICE O/P EST MOD 30 MIN: CPT | Performed by: NURSE PRACTITIONER

## 2020-06-09 NOTE — PROGRESS NOTES
Leatha Goldman is a 39 y.o. female who presents for  evaluation of   No chief complaint on file.    Primary Care Provider    Yanni Silva MD    39 year old with PMHx significant for fibromyalgia, depression , anxiety is referred for hypothyroidism     secondary to Hashimoto's     Duration, Dx in July 2019     Symptoms, Leg Swelling, GERD , 20 lb weight gain , increased fatigue - different from fibromyalgia, headaches    Alleviating, levothyroxine 75 mcgs daily since August 2019     Severity, mild since no complications       Thyroid US from 5/2020:       There are 3 nodules in the right lobe. This includes a midpole nodule  measuring 11 x 7 x 9 mm in size. There is a second midpole nodule  measuring 10 x 6 x 6 mm in size and a lower pole nodule measuring 7 x 7  x 6 mm in size. No discrete nodules are identified within the left lobe    1. Diffuse heterogeneity of both lobes of the thyroid gland with 3  discrete nodules associated with the right lobe. I suspect this  represents a multinodular goiter. Follow-up ultrasound is recommended.       Past Medical History:   Diagnosis Date   • Depression    • Dyspnea    • Edema    • Epigastric abdominal pain    • Fatigue    • Fibromyalgia    • GERD (gastroesophageal reflux disease)    • Globus sensation    • Hypertension    • Hypokalemia    • Hypothyroidism    • IBS (irritable bowel syndrome)    • Migraine    • Obesity    • Vitamin D deficiency      History reviewed. No pertinent family history.  Social History     Tobacco Use   • Smoking status: Current Every Day Smoker     Packs/day: 1.00   • Smokeless tobacco: Never Used   Substance Use Topics   • Alcohol use: Yes     Comment: occasional   • Drug use: No         Current Outpatient Medications:   •  busPIRone (BUSPAR) 15 MG tablet, Take 15 mg by mouth 2 (Two) Times a Day., Disp: , Rfl:   •  Cyanocobalamin (B-12) 500 MCG sublingual tablet, 500 mcgs under the tongue every other day, Disp: 15 tablet, Rfl: 11  •   dicyclomine (BENTYL) 20 MG tablet, Take 20 mg by mouth Every 6 (Six) Hours. prn, Disp: , Rfl:   •  DULoxetine (CYMBALTA) 60 MG capsule, Take 60 mg by mouth Daily., Disp: , Rfl:   •  famotidine (PEPCID) 20 MG tablet, Take 20 mg by mouth 2 (Two) Times a Day. prn, Disp: , Rfl:   •  folic acid (FOLVITE) 400 MCG tablet, Take 1 tablet by mouth Daily., Disp: 100 tablet, Rfl: 3  •  furosemide (LASIX) 20 MG tablet, Take 20 mg by mouth Daily., Disp: , Rfl:   •  levothyroxine (SYNTHROID, LEVOTHROID) 75 MCG tablet, Take 75 mcg by mouth Daily., Disp: , Rfl:   •  rizatriptan MLT (MAXALT-MLT) 5 MG disintegrating tablet, Take 5 mg by mouth 1 (One) Time As Needed for Migraine. May repeat in 2 hours if needed, Disp: , Rfl:   •  tiZANidine (ZANAFLEX) 4 MG tablet, Take 4 mg by mouth 2 (Two) Times a Day., Disp: , Rfl:   •  vitamin D (ERGOCALCIFEROL) 34258 UNITS capsule capsule, Take 50,000 Units by mouth 1 (one) time per week., Disp: , Rfl:     Review of Systems    Review of Systems   Constitutional: Positive for fatigue and unexpected weight change. Negative for activity change, appetite change, chills, diaphoresis and fever.   HENT: Positive for trouble swallowing. Negative for congestion, dental problem, drooling, ear discharge, ear pain, facial swelling, mouth sores, postnasal drip, rhinorrhea, sinus pressure, sore throat, tinnitus and voice change.    Eyes: Negative for photophobia, pain, discharge, redness, itching and visual disturbance.   Respiratory: Positive for shortness of breath. Negative for apnea, cough, choking, chest tightness, wheezing and stridor.    Cardiovascular: Negative for chest pain.   Gastrointestinal: Negative for abdominal distention, abdominal pain, constipation, diarrhea, nausea and vomiting.   Endocrine: Positive for cold intolerance. Negative for heat intolerance, polydipsia, polyphagia and polyuria.   Genitourinary: Negative for decreased urine volume, difficulty urinating, dysuria, flank pain,  "frequency, hematuria and urgency.   Musculoskeletal: Positive for arthralgias and myalgias. Negative for back pain, gait problem, joint swelling, neck pain and neck stiffness.   Skin: Negative for color change, pallor, rash and wound.   Allergic/Immunologic: Negative for immunocompromised state.   Neurological: Positive for weakness. Negative for dizziness, tremors, seizures, syncope, facial asymmetry, speech difficulty, light-headedness, numbness and headaches.   Hematological: Negative for adenopathy.   Psychiatric/Behavioral: Negative for agitation, behavioral problems, confusion, dysphoric mood, hallucinations, self-injury, sleep disturbance and suicidal ideas. The patient is nervous/anxious. The patient is not hyperactive.         Objective:   /70   Pulse 72   Resp 17   Ht 160 cm (62.99\")   Wt 69.6 kg (153 lb 6.4 oz)   SpO2 98%   BMI 27.18 kg/m²     Physical Exam   Constitutional: She is oriented to person, place, and time. Vital signs are normal. She appears well-developed and well-nourished.   HENT:   Head: Normocephalic and atraumatic.   Eyes: Conjunctivae and lids are normal.   Neck: Trachea normal and normal range of motion. No tracheal tenderness present. No edema present. No thyroid mass and no thyromegaly present.   Cardiovascular: Normal rate, normal heart sounds and normal pulses.   Pulmonary/Chest: Effort normal and breath sounds normal. No respiratory distress. She has no wheezes.   Abdominal: Soft. She exhibits no distension and no mass.   Musculoskeletal: Normal range of motion. She exhibits no edema.   Lymphadenopathy:        Head (right side): No submental, no submandibular and no tonsillar adenopathy present.        Head (left side): No submental, no submandibular and no tonsillar adenopathy present.   Neurological: She is alert and oriented to person, place, and time. She has normal strength.   Skin: Skin is warm and dry. No abrasion and no lesion noted. No erythema. No pallor. "   Psychiatric: She has a normal mood and affect. Her speech is normal and behavior is normal. Judgment and thought content normal. Her mood appears not anxious. Her affect is not inappropriate. Cognition and memory are normal. She does not express impulsivity.   Nursing note and vitals reviewed.      Lab Review    Results for orders placed or performed in visit on 05/23/20   TSH   Result Value Ref Range    TSH 2.490 0.270 - 4.200 uIU/mL   Comprehensive Metabolic Panel   Result Value Ref Range    Glucose 88 65 - 99 mg/dL    BUN 8 6 - 20 mg/dL    Creatinine 0.65 0.57 - 1.00 mg/dL    Sodium 141 136 - 145 mmol/L    Potassium 4.2 3.5 - 5.2 mmol/L    Chloride 103 98 - 107 mmol/L    CO2 26.0 22.0 - 29.0 mmol/L    Calcium 9.6 8.6 - 10.5 mg/dL    Total Protein 7.3 6.0 - 8.5 g/dL    Albumin 4.50 3.50 - 5.20 g/dL    ALT (SGPT) 14 1 - 33 U/L    AST (SGOT) 16 1 - 32 U/L    Alkaline Phosphatase 73 39 - 117 U/L    Total Bilirubin 0.5 0.2 - 1.2 mg/dL    eGFR Non African Amer 101 >60 mL/min/1.73    Globulin 2.8 gm/dL    A/G Ratio 1.6 g/dL    BUN/Creatinine Ratio 12.3 7.0 - 25.0    Anion Gap 12.0 5.0 - 15.0 mmol/L   CBC Auto Differential   Result Value Ref Range    WBC 8.32 3.40 - 10.80 10*3/mm3    RBC 4.56 3.77 - 5.28 10*6/mm3    Hemoglobin 14.9 12.0 - 15.9 g/dL    Hematocrit 44.4 34.0 - 46.6 %    MCV 97.4 (H) 79.0 - 97.0 fL    MCH 32.7 26.6 - 33.0 pg    MCHC 33.6 31.5 - 35.7 g/dL    RDW 13.2 12.3 - 15.4 %    RDW-SD 46.9 37.0 - 54.0 fl    MPV 10.4 6.0 - 12.0 fL    Platelets 367 140 - 450 10*3/mm3    Neutrophil % 63.9 42.7 - 76.0 %    Lymphocyte % 23.4 19.6 - 45.3 %    Monocyte % 7.0 5.0 - 12.0 %    Eosinophil % 5.0 0.3 - 6.2 %    Basophil % 0.5 0.0 - 1.5 %    Immature Grans % 0.2 0.0 - 0.5 %    Neutrophils, Absolute 5.31 1.70 - 7.00 10*3/mm3    Lymphocytes, Absolute 1.95 0.70 - 3.10 10*3/mm3    Monocytes, Absolute 0.58 0.10 - 0.90 10*3/mm3    Eosinophils, Absolute 0.42 (H) 0.00 - 0.40 10*3/mm3    Basophils, Absolute 0.04 0.00 - 0.20  10*3/mm3    Immature Grans, Absolute 0.02 0.00 - 0.05 10*3/mm3    nRBC 0.0 0.0 - 0.2 /100 WBC         Assessment/Plan       ICD-10-CM ICD-9-CM   1. Hypothyroidism due to Hashimoto's thyroiditis E03.8 244.8    E06.3 245.2   2. Vitamin D deficiency E55.9 268.9   3. Thyroid nodule E04.1 241.0           Patient has Hashimoto's     Target TSH should be 0.5 to 2.5.     Started with 25 mcgs daily     Now on 75 mcgs daily - Continue     symptoms are unlikely or only minimally related to Hashimoto's and Hypotlhyroidism.     Thyroid US from 5/2020 was reviewed by myself and Dr. Carlin     nodules are stable when compared to outside US.     Repeat US in 6months since it seems to be stable to the previous US -     We will discuss FNA after next U/S     Lab Results   Component Value Date    TSH 2.490 05/29/2020       --\    DUB    Nl estradiol, progesterone and FSH / LH    --    Unlikely adrenal insufficiency as DHEAS is above 60     Rule out Cushing's     NL    --    Macrocytosis    Check b12 and folic acid  Check MTFR    NL     Orders Placed This Encounter   Procedures   • US Thyroid     Standing Status:   Future     Standing Expiration Date:   6/9/2021     Scheduling Instructions:      Need in 6 months ( 12/2020)     Order Specific Question:   Reason for Exam:     Answer:   goiter         A copy of my note was sent to Yanni Silva MD    Return in about 6 months (around 12/9/2020), or if symptoms worsen or fail to improve, for Recheck.      This document has been electronically signed by FREDDY Soto on June 9, 2020 13:28

## 2020-07-06 ENCOUNTER — HOSPITAL ENCOUNTER (OUTPATIENT)
Dept: PAIN MANAGEMENT | Age: 40
Discharge: HOME | End: 2020-07-06
Payer: MEDICAID

## 2020-07-06 VITALS
HEART RATE: 86 BPM | TEMPERATURE: 97.5 F | DIASTOLIC BLOOD PRESSURE: 72 MMHG | SYSTOLIC BLOOD PRESSURE: 136 MMHG | RESPIRATION RATE: 18 BRPM | OXYGEN SATURATION: 99 %

## 2020-07-06 PROCEDURE — 64615 CHEMODENERV MUSC MIGRAINE: CPT

## 2020-07-06 PROCEDURE — 64615 CHEMODENERV MUSC MIGRAINE: CPT | Performed by: PSYCHIATRY & NEUROLOGY

## 2020-07-06 PROCEDURE — 6360000002 HC RX W HCPCS

## 2020-07-06 RX ORDER — ZINC GLUCONATE 50 MG
50 TABLET ORAL NIGHTLY
COMMUNITY
End: 2021-02-22

## 2020-07-06 RX ORDER — MAGNESIUM GLUCONATE 27 MG(500)
500 TABLET ORAL NIGHTLY
COMMUNITY
End: 2021-02-22

## 2020-07-06 NOTE — PROCEDURES
[address]    Patient Name: Wil Shrestha    : 1980                    Age: 35 y.o. Sex: female    Date: 2020    Pre-op Diagnosis: Chronic Migraines    Post-op Diagnosis: Chronic Migraines    Procedure: Botox injections x 155 units (45 units wasted) for migraine    Performing Procedure:  Dr Tyra Araiza    Patient Vitals for the past 24 hrs:   BP Temp Temp src Pulse Resp SpO2   20 1024 136/72 97.5 °F (36.4 °C) Temporal 86 18 99 %       Previous Injections:  Patient had 3 headaches/migraines over the past month. Indications:    Wil Shrestha has been treated for problems with chronic migraine headaches. The patient was taken through a conservative course of treatment without complete resolution of symptoms. Botox injection therapy was offered and after the risks and benefits of the procedure were explained to the patient, we had agreed to proceed. The patient was taken to the procedure room and placed in the seated position. The following muscles were identified using palpation and standard landmarks. These muscles were marked and prepped with alcohol. A total of 155 units were injected after careful aspiration and the following distribution bilaterally:  10 units in 2 sites, procerus 5 units in one site, frontalis 20 units in 4 sites, temporalis 40 unit in 8 sites, occipitals 30 units in 6 sites, cervical paraspinals 20 units in 4 sites, and trapezius 30 units in 6 sites. Discharge: The patient tolerated the procedure well. There were no complications during the procedure and the patient was discharged home with discharge instructions. The patient has been instructed to contact the office should there be any complications or questions to arise between today and their next appointment.     Plan:  [] Will return to the office in   [] 1 month  [] 4 - 6 weeks  [] 8 weeks   [x] 12 weeks:  [x] Procedure Follow-up   [] Office Visit Diane Griffin MD, 7/6/2020 at 12:09 PM

## 2020-08-11 DIAGNOSIS — E06.3 HASHIMOTO'S THYROIDITIS: ICD-10-CM

## 2020-08-11 LAB
ALBUMIN SERPL-MCNC: 4.6 G/DL (ref 3.5–5.2)
ALP BLD-CCNC: 80 U/L (ref 35–104)
ALT SERPL-CCNC: 12 U/L (ref 5–33)
ANION GAP SERPL CALCULATED.3IONS-SCNC: 13 MMOL/L (ref 7–19)
AST SERPL-CCNC: 14 U/L (ref 5–32)
BILIRUB SERPL-MCNC: 0.6 MG/DL (ref 0.2–1.2)
BUN BLDV-MCNC: 10 MG/DL (ref 6–20)
CALCIUM SERPL-MCNC: 9.4 MG/DL (ref 8.6–10)
CHLORIDE BLD-SCNC: 105 MMOL/L (ref 98–111)
CHOLESTEROL, TOTAL: 161 MG/DL (ref 160–199)
CO2: 23 MMOL/L (ref 22–29)
CREAT SERPL-MCNC: 0.7 MG/DL (ref 0.5–0.9)
GFR AFRICAN AMERICAN: >59
GFR NON-AFRICAN AMERICAN: >60
GLUCOSE BLD-MCNC: 88 MG/DL (ref 74–109)
HDLC SERPL-MCNC: 52 MG/DL (ref 65–121)
LDL CHOLESTEROL CALCULATED: 95 MG/DL
POTASSIUM SERPL-SCNC: 3.9 MMOL/L (ref 3.5–5)
SODIUM BLD-SCNC: 141 MMOL/L (ref 136–145)
TOTAL PROTEIN: 7.1 G/DL (ref 6.6–8.7)
TRIGL SERPL-MCNC: 68 MG/DL (ref 0–149)
TSH SERPL DL<=0.05 MIU/L-ACNC: 2.02 UIU/ML (ref 0.27–4.2)
VITAMIN D 25-HYDROXY: 46.4 NG/ML

## 2020-08-12 ENCOUNTER — OFFICE VISIT (OUTPATIENT)
Dept: OBGYN | Age: 40
End: 2020-08-12
Payer: MEDICAID

## 2020-08-12 VITALS
HEART RATE: 78 BPM | WEIGHT: 150 LBS | SYSTOLIC BLOOD PRESSURE: 103 MMHG | BODY MASS INDEX: 26.58 KG/M2 | HEIGHT: 63 IN | TEMPERATURE: 98 F | DIASTOLIC BLOOD PRESSURE: 72 MMHG

## 2020-08-12 PROCEDURE — 99396 PREV VISIT EST AGE 40-64: CPT | Performed by: OBSTETRICS & GYNECOLOGY

## 2020-08-12 ASSESSMENT — ENCOUNTER SYMPTOMS
RESPIRATORY NEGATIVE: 1
GASTROINTESTINAL NEGATIVE: 1
EYES NEGATIVE: 1

## 2020-08-12 NOTE — PATIENT INSTRUCTIONS

## 2020-08-12 NOTE — PROGRESS NOTES
Rachel DUARTE RN, am scribing for and in the presence of Dr. Jamal Rashid 8/12/2020/10:50 AM/sign    Subjective:      Patient ID: Bulmaro Jackson is a 38 y.o. female. HPI    Patient presents for her annual pap smear and breast exam. Last mammogram was in 9/2019. She has been diagnosed with Hashimoto's and is being treated by PCP. Bulmaro Jackson is a 38 y.o. female with the following history as recorded in [facility]:  Patient Active Problem List    Diagnosis Date Noted    Pain, neck 02/18/2020    Photophobia 02/18/2020    Fibromyalgia 02/18/2020    Migraine without status migrainosus, not intractable 12/20/2019    Hypothyroidism due to Hashimoto's thyroiditis 09/19/2019    Thyroid nodule 09/11/2019    Labial hypertrophy 02/16/2017    Vaginal inclusion cyst 02/16/2017    Vulvar skin tag 02/16/2017    Ovarian cyst 02/19/2013    Dyspareunia, female 02/19/2013     Current Outpatient Medications   Medication Sig Dispense Refill    magnesium gluconate (MAGONATE) 500 MG tablet Take 500 mg by mouth nightly      zinc gluconate 50 MG tablet Take 50 mg by mouth nightly      dicyclomine (BENTYL) 20 MG tablet TAKE 1 TABLET BY MOUTH 2 TIMES DAILY AS NEEDED (ABDOMINAL PAIN) 60 tablet 5    vitamin D (ERGOCALCIFEROL) 1.25 MG (69631 UT) CAPS capsule TAKE ONE CAPSULE BY MOUTH WEEKLY 4 capsule 5    naratriptan (AMERGE) 2.5 MG tablet Take 1 tablet by mouth See Admin Instructions Take one (1) tablet at the onset of headache; if headache returns or does not fully resolve, the dose may be repeated after 4 hours; do not exceed five (5)mg in 24 hours.  9 tablet 5    famotidine (PEPCID) 20 MG tablet Take 1 tablet by mouth 2 times daily as needed (reflux) 180 tablet 3    tiZANidine (ZANAFLEX) 4 MG tablet Take 4 mg by mouth 1 QAM 2 QPM       azelastine HCl 0.15 % SOLN USE 2 SPRAYS TWICE A DAY AS NEEDED 30 mL 3    DULoxetine (CYMBALTA) 60 MG extended release capsule Take 60 mg by mouth daily      busPIRone (BUSPAR) 5 MG Negative. Respiratory: Negative. Cardiovascular: Negative. Gastrointestinal: Negative. Genitourinary: Negative for difficulty urinating, dyspareunia, dysuria, enuresis, frequency, hematuria, menstrual problem, pelvic pain, urgency and vaginal discharge. Musculoskeletal: Negative. Skin: Negative. Neurological: Negative. Psychiatric/Behavioral: Negative. Objective:/72 (Site: Left Upper Arm, Position: Sitting, Cuff Size: Medium Adult)   Pulse 78   Temp 98 °F (36.7 °C) (Temporal)   Ht 5' 3\" (1.6 m)   Wt 150 lb (68 kg)   LMP 07/18/2020   BMI 26.57 kg/m²      Physical Exam  Constitutional:       General: She is not in acute distress. Appearance: She is well-developed. HENT:      Head: Normocephalic and atraumatic. Right Ear: Hearing normal.      Left Ear: Hearing normal.      Nose: Nose normal.   Eyes:      General: Lids are normal.      Conjunctiva/sclera: Conjunctivae normal.      Pupils: Pupils are equal, round, and reactive to light. Neck:      Musculoskeletal: Normal range of motion and neck supple. Thyroid: No thyromegaly. Trachea: No tracheal deviation. Cardiovascular:      Rate and Rhythm: Normal rate and regular rhythm. Heart sounds: Normal heart sounds. Pulmonary:      Effort: Pulmonary effort is normal. No respiratory distress. Breath sounds: Normal breath sounds. No wheezing. Chest:      Breasts: Breasts are symmetrical.         Right: No inverted nipple, mass, nipple discharge, skin change or tenderness. Left: No inverted nipple, mass, nipple discharge, skin change or tenderness. Abdominal:      General: There is no distension. Palpations: Abdomen is soft. Tenderness: There is no abdominal tenderness. There is no guarding or rebound. Genitourinary:     Labia:         Right: No rash, tenderness or lesion. Left: No rash, tenderness or lesion. Vagina: No vaginal discharge or bleeding.       Cervix: No cervical motion tenderness, discharge or friability. Uterus: Not deviated, not enlarged, not fixed and not tender. Adnexa:         Right: No mass, tenderness or fullness. Left: No mass, tenderness or fullness. Rectum: No anal fissure or external hemorrhoid. Comments: Specimen for cervical cytology obtained. Musculoskeletal: Normal range of motion. Comments: Normal ROM in all four extremities; normal gait   Lymphadenopathy:      Head:      Right side of head: No submental, submandibular or tonsillar adenopathy. Left side of head: No submental, submandibular or tonsillar adenopathy. Cervical: No cervical adenopathy. Upper Body:      Right upper body: No supraclavicular adenopathy. Left upper body: No supraclavicular adenopathy. Skin:     General: Skin is warm and dry. Findings: No erythema or rash. Neurological:      Mental Status: She is alert and oriented to person, place, and time. Psychiatric:         Behavior: Behavior normal.         Assessment:       Diagnosis Orders   1. Well woman exam with routine gynecological exam  PAP SMEAR   2. Screening for cervical cancer  PAP SMEAR   3. Screening for HPV (human papillomavirus)  Human papillomavirus (HPV) DNA Probe Thin Prep High Risk   4. Encounter for screening mammogram for breast cancer  FLAKITA DIGITAL SCREEN W OR WO CAD BILATERAL           Plan:      MEDICATIONS:  No orders of the defined types were placed in this encounter. ORDERS:  Orders Placed This Encounter   Procedures    FLAKITA DIGITAL SCREEN W OR WO CAD BILATERAL    PAP SMEAR    Human papillomavirus (HPV) DNA Probe Thin Prep High Risk     The importance of self-breast examination was discussed. A screening mammogram is recommended at age 28 unless otherwise indicated. At age 35, annual mammogram screenings are recommended. A well balanced diet and exercise were encouraged, as well as the addition of multivitamin.  F/u in 1 year or

## 2020-08-17 ENCOUNTER — OFFICE VISIT (OUTPATIENT)
Dept: INTERNAL MEDICINE | Age: 40
End: 2020-08-17
Payer: MEDICAID

## 2020-08-17 VITALS
BODY MASS INDEX: 26.57 KG/M2 | DIASTOLIC BLOOD PRESSURE: 68 MMHG | OXYGEN SATURATION: 97 % | WEIGHT: 150 LBS | SYSTOLIC BLOOD PRESSURE: 102 MMHG | HEART RATE: 75 BPM

## 2020-08-17 DIAGNOSIS — B37.0 THRUSH: ICD-10-CM

## 2020-08-17 LAB
BASOPHILS ABSOLUTE: 0.1 K/UL (ref 0–0.2)
BASOPHILS RELATIVE PERCENT: 0.6 % (ref 0–1)
C-REACTIVE PROTEIN: 0.11 MG/DL (ref 0–0.5)
EOSINOPHILS ABSOLUTE: 0.4 K/UL (ref 0–0.6)
EOSINOPHILS RELATIVE PERCENT: 5.1 % (ref 0–5)
HCT VFR BLD CALC: 44.4 % (ref 37–47)
HEMOGLOBIN: 14.7 G/DL (ref 12–16)
IMMATURE GRANULOCYTES #: 0 K/UL
LYMPHOCYTES ABSOLUTE: 2 K/UL (ref 1.1–4.5)
LYMPHOCYTES RELATIVE PERCENT: 25 % (ref 20–40)
MCH RBC QN AUTO: 32.6 PG (ref 27–31)
MCHC RBC AUTO-ENTMCNC: 33.1 G/DL (ref 33–37)
MCV RBC AUTO: 98.4 FL (ref 81–99)
MONOCYTES ABSOLUTE: 0.6 K/UL (ref 0–0.9)
MONOCYTES RELATIVE PERCENT: 7.3 % (ref 0–10)
NEUTROPHILS ABSOLUTE: 5 K/UL (ref 1.5–7.5)
NEUTROPHILS RELATIVE PERCENT: 61.8 % (ref 50–65)
PDW BLD-RTO: 13.2 % (ref 11.5–14.5)
PLATELET # BLD: 327 K/UL (ref 130–400)
PMV BLD AUTO: 10.2 FL (ref 9.4–12.3)
RBC # BLD: 4.51 M/UL (ref 4.2–5.4)
SEDIMENTATION RATE, ERYTHROCYTE: 7 MM/HR (ref 0–20)
WBC # BLD: 8.1 K/UL (ref 4.8–10.8)

## 2020-08-17 PROCEDURE — 4004F PT TOBACCO SCREEN RCVD TLK: CPT | Performed by: INTERNAL MEDICINE

## 2020-08-17 PROCEDURE — G8427 DOCREV CUR MEDS BY ELIG CLIN: HCPCS | Performed by: INTERNAL MEDICINE

## 2020-08-17 PROCEDURE — 99214 OFFICE O/P EST MOD 30 MIN: CPT | Performed by: INTERNAL MEDICINE

## 2020-08-17 PROCEDURE — G8419 CALC BMI OUT NRM PARAM NOF/U: HCPCS | Performed by: INTERNAL MEDICINE

## 2020-08-17 RX ORDER — CLOTRIMAZOLE 10 MG/1
10 LOZENGE ORAL; TOPICAL
Qty: 50 TABLET | Refills: 0 | Status: SHIPPED | OUTPATIENT
Start: 2020-08-17 | End: 2020-08-27

## 2020-08-17 NOTE — PROGRESS NOTES
Chief Complaint   Patient presents with    6 Month Follow-Up     Havent been feeling good for the [address] been feeling really weak. HPI: Nori Dietrich is a 38 y.o. female is here for fibromyalgia, vitamin D deficiency, migraines, hypothyroidism, and depression. Her blood pressures well controlled on Lasix. She was having some lower extremity swelling at one time, but the Lasix seems to have taken care of this. She reports that she has been very tired for the past week. Sometimes, she has difficulty with swallowing. Her fibromyalgia doctor gave her nystatin for what was suspected to be a thrush infection. She has a white coating on her tongue. Nystatin did not help. She wants to know what else she could try. She states that she is just very tired. She thinks her immune system has been \"compromised. \". She is requesting inflammatory markers and a CBC today. In terms of her fibromyalgia, she has good days and bad days. She states that her fibromyalgia is well controlled on her current medication regimen. Her depression is relatively stable. Her thyroid function is stable. Her migraines are much improved with Botox. She still gets about 5 migraines per month, but they do not occur every day as they once did. Her reflux is well controlled on her current medication regimen.   Her vitamin D level is normal.    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 History     Socioeconomic History    Marital status:      Spouse name: None    Number of children: None    Years of education: None    Highest education level: None   Occupational History    None   Social Needs    Financial resource strain: None    Food insecurity     Worry: None     Inability: None    Transportation needs     Medical: None     Non-medical: None   Tobacco Use    Smoking status: Current Every Day Smoker     Packs/day: 0.50     Years: 10.00     Pack years: 5.00     Types: Cigarettes    Smokeless tobacco: Never Used   Substance and Sexual Activity    Alcohol use:  Yes     Alcohol/week: 1.0 standard drinks     Types: 1 Shots of liquor per week     Comment: 2 per month    Drug use: No    Sexual activity: Not Currently     Partners: Male   Lifestyle    Physical activity     Days per week: None     Minutes per session: None    Stress: None   Relationships    Social connections     Talks on phone: None     Gets together: None     Attends Synagogue service: None     Active member of club or organization: None     Attends meetings of clubs or organizations: None     Relationship status: None    Intimate partner violence     Fear of current or ex partner: None     Emotionally abused: None     Physically abused: None     Forced sexual activity: None   Other Topics Concern    None   Social History Narrative    None      Family History   Problem Relation Age of Onset    Breast Cancer Mother 61    Cancer Maternal Aunt         breast unsure of age   Eyvonne Clonts Cancer Paternal Aunt         breast unsure of age   Eyvonne Clonts Stroke Maternal Grandmother     Stroke Maternal Grandfather     Cancer Paternal Aunt         breast unsure of age        Current Outpatient unexpected weight change. HENT: Negative for congestion, ear pain, facial swelling, hearing loss, mouth sores, nosebleeds, postnasal drip, rhinorrhea, sinus pressure, sneezing, sore throat, tinnitus, trouble swallowing and voice change. Thrush on tongue   Eyes: Negative for photophobia, pain, discharge, redness, itching and visual disturbance. Respiratory: Negative for cough, choking, chest tightness, shortness of breath and wheezing. Cardiovascular: Negative for chest pain, palpitations and leg swelling. Gastrointestinal: Negative for abdominal distention, abdominal pain, anal bleeding, blood in stool, constipation, diarrhea, nausea, rectal pain and vomiting. Endocrine: Negative for cold intolerance, heat intolerance, polydipsia, polyphagia and polyuria. Genitourinary: Negative for decreased urine volume, difficulty urinating, dysuria, flank pain, frequency, hematuria and urgency. Musculoskeletal: Positive for myalgias. Negative for arthralgias, back pain, gait problem, joint swelling, neck pain and neck stiffness. Aches and hurts all over from fibromyalgia   Skin: Negative for color change, pallor and rash. Allergic/Immunologic: Negative for environmental allergies and food allergies. Neurological: Positive for headaches. Negative for dizziness, tremors, syncope, weakness, light-headedness and numbness. Hematological: Negative for adenopathy. Does not bruise/bleed easily. Psychiatric/Behavioral: Negative for agitation, behavioral problems, confusion, decreased concentration, dysphoric mood, hallucinations, self-injury, sleep disturbance and suicidal ideas. The patient is not nervous/anxious and is not hyperactive. /68   Pulse 75   Wt 150 lb (68 kg)   LMP 07/18/2020   SpO2 97%   BMI 26.57 kg/m²   Physical Exam  Vitals signs and nursing note reviewed. Constitutional:       General: She is not in acute distress. Appearance: Normal appearance.  She is Cervical: No cervical adenopathy. Skin:     General: Skin is warm and dry. Capillary Refill: Capillary refill takes less than 2 seconds. Coloration: Skin is not jaundiced or pale. Findings: No bruising, erythema, lesion or rash. Neurological:      General: No focal deficit present. Mental Status: She is alert and oriented to person, place, and time. Mental status is at baseline. Cranial Nerves: No cranial nerve deficit. Sensory: No sensory deficit. Motor: No weakness or abnormal muscle tone. Coordination: Coordination normal.      Gait: Gait normal.      Deep Tendon Reflexes: Reflexes are normal and symmetric. Reflexes normal.   Psychiatric:         Mood and Affect: Mood normal.         Behavior: Behavior normal.         Thought Content: Thought content normal.         Judgment: Judgment normal.         No diagnosis found. ASSESSMENT/PLAN:    48-year-old woman here for follow-up    1. Fibromyalgia: Stable    2. Thrush: Try Mycelex. CBC, ESR and CRP ordered. 3.  Vitamin D deficiency: Continue ergocalciferol    4. Migraines: Much improved with Botox    5. Anxiety and depression: Stable    6. Hypertension: Blood pressure well controlled on Lasix    7. Acid reflux: Continue Pepcid as prescribed    8. Hypothyroidism: Continue Synthroid at current dose        There are no diagnoses linked to this encounter. No follow-ups on file. No orders of the defined types were placed in this encounter.       Wayne Christensen MD

## 2020-08-17 NOTE — PATIENT INSTRUCTIONS
Patient Education        Hashimoto's Thyroiditis: Care Instructions  Your Care Instructions     Hashimoto's thyroiditis is a problem with the thyroid gland. The thyroid gland, which is in your neck, controls the way your body uses energy. Sometimes the disease causes the gland to make too much thyroid hormone (thyrotoxicosis). This can make you feel nervous, lose weight, and have many loose bowel movements. You may also have a fast heartbeat. But as the disease progresses, the gland usually does not make enough thyroid hormone. This can cause you to feel tired and have dry skin and thinning hair. Most people with Hashimoto's are diagnosed when they have these symptoms. You may need to take medicine if you have symptoms or if your thyroid hormone level is not normal. Most people with Hashimoto's thyroiditis need to take medicine for the rest of their lives. Follow-up care is a key part of your treatment and safety. Be sure to make and go to all appointments, and call your doctor if you are having problems. It's also a good idea to know your test results and keep a list of the medicines you take. How can you care for yourself at home? · Take your medicines exactly as prescribed. Call your doctor if you think you are having a problem with your medicine. You will get more details on the specific medicines your doctor prescribes. When should you call for help? [id number] anytime you think you may need emergency care. For example, call if:  · You passed out (lost consciousness). · You have severe trouble breathing. · You have a very slow heartbeat (less than 60 beats a minute). · You have a low body temperature (95°F or below). Watch closely for changes in your health, and be sure to contact your doctor if:  · You gain weight even though you are eating normally or less than usual.  · You feel extremely weak or tired. · You have new changes in your skin, nails, or hair, or the changes get worse.   · You notice that your thyroid gland has grown or changed in size. · You have constipation that is new or that gets worse. · You cannot stand cold temperatures. · You have heavy or irregular menstrual periods. · You have other new symptoms. Where can you learn more? Go to https://tushar.healthAnsible. org and sign in to your "[employer] account. Enter K454 in the [employer] box to learn more about \"Hashimoto's Thyroiditis: Care Instructions. \"     If you do not have an account, please click on the \"Sign Up Now\" link. Current as of: July 29, 2019               Content Version: 12.5  © 7302-1793 [employer]. Care instructions adapted under license by [facility] ([facility]). If you have questions about a medical condition or this instruction, always ask your healthcare professional. [address] any warranty or liability for your use of this information. Patient Education        Candidiasis: Care Instructions  Your Care Instructions  Candidiasis (say \"lne-phr-QY-uh-shaniqua\") is a yeast infection. Yeast normally lives in your body. But it can cause problems if your body's defenses don't work as they should. Some medicines can increase your chance of getting a yeast infection. These include antibiotics, steroids, and cancer drugs. And some diseases like AIDS and diabetes can make you more likely to get yeast infections. There are different types of yeast infections. Earnie Fried is a yeast infection in the mouth. It usually occurs in people with weak immune systems. It causes white patches inside the mouth and throat. Yeast infections of the skin usually occur in skin folds where the skin stays moist. They cause red, oozing patches on your skin. Babies can get these infections under the diaper. People who often wear gloves can get them on their hands. Many women get vaginal yeast infections. They are most common when women take antibiotics.  These infections can cause the vagina to itch and burn. They also cause white discharge that looks like cottage cheese. In rare cases, yeast infects the blood. This can cause serious disease. This kind of infection is treated with medicine given through a needle into a vein (IV). After you start treatment, a yeast infection usually goes away quickly. But if your immune system is weak, the infection may come back. Tell your doctor if you get yeast infections often. Follow-up care is a key part of your treatment and safety. Be sure to make and go to all appointments, and call your doctor if you are having problems. It's also a good idea to know your test results and keep a list of the medicines you take. How can you care for yourself at home? · Take your medicines exactly as prescribed. Call your doctor if you think you are having a problem with your medicine. · Use antibiotics only as directed by your doctor. · Eat yogurt with live cultures. It has bacteria called lactobacillus. It may help prevent some types of yeast infections. · Keep your skin clean and dry. Put powder on moist places. · If you are using a cream or suppository to treat a vaginal yeast infection, don't use condoms or a diaphragm. Use a different type of birth control. · Eat a healthy diet and get regular exercise. This will help keep your immune system strong. When should you call for help? Watch closely for changes in your health, and be sure to contact your doctor if:  · You do not get better as expected. Where can you learn more? Go to https://Priviapemarcewbetty.healthPagoFacil. org and sign in to your [employer] account. Enter L465 in the [facility] box to learn more about \"Candidiasis: Care Instructions. \"     If you do not have an account, please click on the \"Sign Up Now\" link. Current as of: November 8, 2019               Content Version: 12.5  © 1638-9724 [employer]. Care instructions adapted under license by [address]  If you have questions about a medical condition or this instruction, always ask your healthcare professional. [place] [address] any warranty or liability for your use of this information.

## 2020-08-18 LAB
HPV COMMENT: NORMAL
HPV TYPE 16: NOT DETECTED
HPV TYPE 18: NOT DETECTED
HPVOH (OTHER TYPES): NOT DETECTED

## 2020-08-18 ASSESSMENT — ENCOUNTER SYMPTOMS
COLOR CHANGE: 0
SORE THROAT: 0
EYE REDNESS: 0
NAUSEA: 0
PHOTOPHOBIA: 0
ABDOMINAL DISTENTION: 0
RHINORRHEA: 0
ABDOMINAL PAIN: 0
EYE PAIN: 0
CONSTIPATION: 0
ANAL BLEEDING: 0
DIARRHEA: 0
RECTAL PAIN: 0
EYE ITCHING: 0
SINUS PRESSURE: 0
CHEST TIGHTNESS: 0
VOMITING: 0
COUGH: 0
FACIAL SWELLING: 0
VOICE CHANGE: 0
EYE DISCHARGE: 0
WHEEZING: 0
BACK PAIN: 0
BLOOD IN STOOL: 0
SHORTNESS OF BREATH: 0
TROUBLE SWALLOWING: 0
CHOKING: 0

## 2020-08-24 ENCOUNTER — TELEPHONE (OUTPATIENT)
Dept: OBGYN | Age: 40
End: 2020-08-24

## 2020-08-24 RX ORDER — METRONIDAZOLE 500 MG/1
500 TABLET ORAL 2 TIMES DAILY
Qty: 14 TABLET | Refills: 0 | Status: SHIPPED | OUTPATIENT
Start: 2020-08-24 | End: 2020-08-31

## 2020-08-24 RX ORDER — FLUCONAZOLE 150 MG/1
150 TABLET ORAL ONCE
Qty: 2 TABLET | Refills: 0 | Status: SHIPPED | OUTPATIENT
Start: 2020-08-24 | End: 2020-08-24

## 2020-08-27 ENCOUNTER — OFFICE VISIT (OUTPATIENT)
Dept: ENDOCRINOLOGY | Facility: CLINIC | Age: 40
End: 2020-08-27

## 2020-08-27 ENCOUNTER — LAB (OUTPATIENT)
Dept: LAB | Facility: HOSPITAL | Age: 40
End: 2020-08-27

## 2020-08-27 VITALS
OXYGEN SATURATION: 98 % | HEART RATE: 76 BPM | HEIGHT: 63 IN | DIASTOLIC BLOOD PRESSURE: 76 MMHG | SYSTOLIC BLOOD PRESSURE: 126 MMHG | BODY MASS INDEX: 26.72 KG/M2 | WEIGHT: 150.8 LBS

## 2020-08-27 DIAGNOSIS — E06.3 HYPOTHYROIDISM DUE TO HASHIMOTO'S THYROIDITIS: ICD-10-CM

## 2020-08-27 DIAGNOSIS — E03.8 HYPOTHYROIDISM DUE TO HASHIMOTO'S THYROIDITIS: Primary | ICD-10-CM

## 2020-08-27 DIAGNOSIS — K14.6 SORENESS OF TONGUE: ICD-10-CM

## 2020-08-27 DIAGNOSIS — E03.8 HYPOTHYROIDISM DUE TO HASHIMOTO'S THYROIDITIS: ICD-10-CM

## 2020-08-27 DIAGNOSIS — E06.3 HYPOTHYROIDISM DUE TO HASHIMOTO'S THYROIDITIS: Primary | ICD-10-CM

## 2020-08-27 DIAGNOSIS — E53.8 B12 DEFICIENCY: ICD-10-CM

## 2020-08-27 PROCEDURE — 84443 ASSAY THYROID STIM HORMONE: CPT

## 2020-08-27 PROCEDURE — 36415 COLL VENOUS BLD VENIPUNCTURE: CPT

## 2020-08-27 PROCEDURE — 84439 ASSAY OF FREE THYROXINE: CPT

## 2020-08-27 PROCEDURE — 82607 VITAMIN B-12: CPT

## 2020-08-27 PROCEDURE — 84466 ASSAY OF TRANSFERRIN: CPT

## 2020-08-27 PROCEDURE — 83540 ASSAY OF IRON: CPT

## 2020-08-27 PROCEDURE — 99214 OFFICE O/P EST MOD 30 MIN: CPT | Performed by: NURSE PRACTITIONER

## 2020-08-27 NOTE — PROGRESS NOTES
Leatha Goldamn is a 40 y.o. female who presents for  evaluation of   Chief Complaint   Patient presents with   • Thyroid Problem   • Difficulty Swallowing     at night   • Weight Loss     unable to loss weight   • Oral Swelling     tongue color, shape different, , taste bud different      Primary Care Provider    Yanni Silva MD    39 year old with PMHx significant for fibromyalgia, depression , anxiety is referred for hypothyroidism     secondary to Hashimoto's     Duration, Dx in July 2019     Symptoms, Leg Swelling, GERD , 20 lb weight gain , increased fatigue - different from fibromyalgia, headaches    Alleviating, levothyroxine 75 mcgs daily since August 2019     Severity, mild since no complications       Thyroid US from 5/2020:       There are 3 nodules in the right lobe. This includes a midpole nodule  measuring 11 x 7 x 9 mm in size. There is a second midpole nodule  measuring 10 x 6 x 6 mm in size and a lower pole nodule measuring 7 x 7  x 6 mm in size. No discrete nodules are identified within the left lobe    1. Diffuse heterogeneity of both lobes of the thyroid gland with 3  discrete nodules associated with the right lobe. I suspect this  represents a multinodular goiter. Follow-up ultrasound is recommended.         Having problems with tongue, currently on Clotrimazole, tried Nystatin swish and swallow. She thinks this is due to her thyroid.       Past Medical History:   Diagnosis Date   • Depression    • Dyspnea    • Edema    • Epigastric abdominal pain    • Fatigue    • Fibromyalgia    • GERD (gastroesophageal reflux disease)    • Globus sensation    • Hypertension    • Hypokalemia    • Hypothyroidism    • IBS (irritable bowel syndrome)    • Migraine    • Obesity    • Vitamin D deficiency      History reviewed. No pertinent family history.  Social History     Tobacco Use   • Smoking status: Current Every Day Smoker     Packs/day: 1.00   • Smokeless tobacco: Never Used   Substance Use Topics    • Alcohol use: Yes     Comment: occasional   • Drug use: No         Current Outpatient Medications:   •  busPIRone (BUSPAR) 15 MG tablet, Take 15 mg by mouth 2 (Two) Times a Day., Disp: , Rfl:   •  dicyclomine (BENTYL) 20 MG tablet, Take 20 mg by mouth Every 6 (Six) Hours. prn, Disp: , Rfl:   •  DULoxetine (CYMBALTA) 60 MG capsule, Take 60 mg by mouth Daily., Disp: , Rfl:   •  famotidine (PEPCID) 20 MG tablet, Take 20 mg by mouth 2 (Two) Times a Day. prn, Disp: , Rfl:   •  folic acid (FOLVITE) 400 MCG tablet, Take 1 tablet by mouth Daily., Disp: 100 tablet, Rfl: 3  •  furosemide (LASIX) 20 MG tablet, Take 20 mg by mouth Daily., Disp: , Rfl:   •  levothyroxine (SYNTHROID, LEVOTHROID) 75 MCG tablet, Take 75 mcg by mouth Daily., Disp: , Rfl:   •  rizatriptan MLT (MAXALT-MLT) 5 MG disintegrating tablet, Take 5 mg by mouth 1 (One) Time As Needed for Migraine. May repeat in 2 hours if needed, Disp: , Rfl:   •  tiZANidine (ZANAFLEX) 4 MG tablet, Take 4 mg by mouth 2 (Two) Times a Day., Disp: , Rfl:   •  vitamin D (ERGOCALCIFEROL) 20685 UNITS capsule capsule, Take 50,000 Units by mouth 1 (one) time per week., Disp: , Rfl:   •  Cyanocobalamin (B-12) 500 MCG sublingual tablet, 500 mcgs under the tongue every other day, Disp: 15 tablet, Rfl: 11    Review of Systems    Review of Systems   Constitutional: Positive for fatigue and unexpected weight change. Negative for activity change, appetite change, chills, diaphoresis and fever.   HENT: Positive for trouble swallowing. Negative for congestion, dental problem, drooling, ear discharge, ear pain, facial swelling, mouth sores, postnasal drip, rhinorrhea, sinus pressure, sore throat, tinnitus and voice change.         Sore tongue    Eyes: Negative for photophobia, pain, discharge, redness, itching and visual disturbance.   Respiratory: Positive for shortness of breath. Negative for apnea, cough, choking, chest tightness, wheezing and stridor.    Cardiovascular: Negative for  "chest pain.   Gastrointestinal: Negative for abdominal distention, abdominal pain, constipation, diarrhea, nausea and vomiting.   Endocrine: Positive for cold intolerance. Negative for heat intolerance, polydipsia, polyphagia and polyuria.   Genitourinary: Negative for decreased urine volume, difficulty urinating, dysuria, flank pain, frequency, hematuria and urgency.   Musculoskeletal: Positive for arthralgias and myalgias. Negative for back pain, gait problem, joint swelling, neck pain and neck stiffness.   Skin: Negative for color change, pallor, rash and wound.   Allergic/Immunologic: Negative for immunocompromised state.   Neurological: Positive for weakness. Negative for dizziness, tremors, seizures, syncope, facial asymmetry, speech difficulty, light-headedness, numbness and headaches.   Hematological: Negative for adenopathy.   Psychiatric/Behavioral: Negative for agitation, behavioral problems, confusion, dysphoric mood, hallucinations, self-injury, sleep disturbance and suicidal ideas. The patient is nervous/anxious. The patient is not hyperactive.         Objective:   /76 (BP Location: Left arm, Patient Position: Sitting, Cuff Size: Adult)   Pulse 76   Ht 160 cm (63\")   Wt 68.4 kg (150 lb 12.8 oz)   SpO2 98%   BMI 26.71 kg/m²     Physical Exam   Constitutional: She is oriented to person, place, and time. Vital signs are normal. She appears well-developed and well-nourished.   HENT:   Head: Normocephalic and atraumatic.   Mouth/Throat: No tonsillar exudate.   Teeth indention on side of tongue, white film on tongue- currently being treated for thrush    Eyes: Conjunctivae and lids are normal.   Neck: Trachea normal and normal range of motion. No tracheal tenderness present. No edema present. No thyroid mass and no thyromegaly present.   Cardiovascular: Normal rate, normal heart sounds and normal pulses.   Pulmonary/Chest: Effort normal and breath sounds normal. No respiratory distress. She has no " wheezes.   Abdominal: Soft. She exhibits no distension and no mass.   Musculoskeletal: Normal range of motion. She exhibits no edema.   Lymphadenopathy:        Head (right side): No submental, no submandibular and no tonsillar adenopathy present.        Head (left side): No submental, no submandibular and no tonsillar adenopathy present.   Neurological: She is alert and oriented to person, place, and time. She has normal strength.   Skin: Skin is warm and dry. No abrasion and no lesion noted. No erythema. No pallor.   Psychiatric: She has a normal mood and affect. Her speech is normal and behavior is normal. Judgment and thought content normal. Her mood appears not anxious. Her affect is not inappropriate. Cognition and memory are normal. She does not express impulsivity.   Nursing note and vitals reviewed.      Lab Review    Results for orders placed or performed in visit on 05/23/20   TSH   Result Value Ref Range    TSH 2.490 0.270 - 4.200 uIU/mL   Comprehensive Metabolic Panel   Result Value Ref Range    Glucose 88 65 - 99 mg/dL    BUN 8 6 - 20 mg/dL    Creatinine 0.65 0.57 - 1.00 mg/dL    Sodium 141 136 - 145 mmol/L    Potassium 4.2 3.5 - 5.2 mmol/L    Chloride 103 98 - 107 mmol/L    CO2 26.0 22.0 - 29.0 mmol/L    Calcium 9.6 8.6 - 10.5 mg/dL    Total Protein 7.3 6.0 - 8.5 g/dL    Albumin 4.50 3.50 - 5.20 g/dL    ALT (SGPT) 14 1 - 33 U/L    AST (SGOT) 16 1 - 32 U/L    Alkaline Phosphatase 73 39 - 117 U/L    Total Bilirubin 0.5 0.2 - 1.2 mg/dL    eGFR Non African Amer 101 >60 mL/min/1.73    Globulin 2.8 gm/dL    A/G Ratio 1.6 g/dL    BUN/Creatinine Ratio 12.3 7.0 - 25.0    Anion Gap 12.0 5.0 - 15.0 mmol/L   CBC Auto Differential   Result Value Ref Range    WBC 8.32 3.40 - 10.80 10*3/mm3    RBC 4.56 3.77 - 5.28 10*6/mm3    Hemoglobin 14.9 12.0 - 15.9 g/dL    Hematocrit 44.4 34.0 - 46.6 %    MCV 97.4 (H) 79.0 - 97.0 fL    MCH 32.7 26.6 - 33.0 pg    MCHC 33.6 31.5 - 35.7 g/dL    RDW 13.2 12.3 - 15.4 %    RDW-SD 46.9  37.0 - 54.0 fl    MPV 10.4 6.0 - 12.0 fL    Platelets 367 140 - 450 10*3/mm3    Neutrophil % 63.9 42.7 - 76.0 %    Lymphocyte % 23.4 19.6 - 45.3 %    Monocyte % 7.0 5.0 - 12.0 %    Eosinophil % 5.0 0.3 - 6.2 %    Basophil % 0.5 0.0 - 1.5 %    Immature Grans % 0.2 0.0 - 0.5 %    Neutrophils, Absolute 5.31 1.70 - 7.00 10*3/mm3    Lymphocytes, Absolute 1.95 0.70 - 3.10 10*3/mm3    Monocytes, Absolute 0.58 0.10 - 0.90 10*3/mm3    Eosinophils, Absolute 0.42 (H) 0.00 - 0.40 10*3/mm3    Basophils, Absolute 0.04 0.00 - 0.20 10*3/mm3    Immature Grans, Absolute 0.02 0.00 - 0.05 10*3/mm3    nRBC 0.0 0.0 - 0.2 /100 WBC         Assessment/Plan       ICD-10-CM ICD-9-CM   1. Hypothyroidism due to Hashimoto's thyroiditis E03.8 244.8    E06.3 245.2   2. B12 deficiency E53.8 266.2   3. Soreness of tongue K14.6 529.6      Hashimoto's     Target TSH should be 0.5 to 2.5.     Started with 25 mcgs daily     Now on 75 mcgs daily - Continue     symptoms are unlikely or only minimally related to Hashimoto's and Hypotlhyroidism.     Thyroid US from 5/2020 was reviewed by myself and Dr. Carlin     nodules are stable when compared to outside US.     Repeat US in 6months since it seems to be stable to the previous US -     We will discuss FNA after next U/S     Lab Results   Component Value Date    TSH 2.020 08/11/2020         DUB    Nl estradiol, progesterone and FSH / LH    --    Unlikely adrenal insufficiency as DHEAS is above 60     Rule out Cushing's     NL    --    Macrocytosis    Check b12 and folic acid  Check MTFR    NL       Sore tongue     Patient currently being treated by PCP for thrush - not improving , has tried swish and swallow as well as Clotrimazole     States the shape of her tongue has changed- examination shows teeth imprints on side of tongue, which I assured her was normal     Reassured patient this was not due to thyroid     I will check iron, b12     She will need to continue to follow up with PCP       Orders Placed  This Encounter   Procedures   • TSH     Standing Status:   Future     Number of Occurrences:   1     Standing Expiration Date:   8/28/2021   • T4, Free     Standing Status:   Future     Number of Occurrences:   1     Standing Expiration Date:   8/27/2021   • Iron Profile     Standing Status:   Future     Number of Occurrences:   1     Standing Expiration Date:   8/27/2021   • Vitamin B12     Standing Status:   Future     Number of Occurrences:   1     Standing Expiration Date:   8/27/2021         A copy of my note was sent to Yanni Silva MD    Return if symptoms worsen or fail to improve, for Next scheduled follow up.      This document has been electronically signed by FREDDY Soot on August 27, 2020 17:29

## 2020-08-28 LAB
IRON 24H UR-MRATE: 134 MCG/DL (ref 37–145)
IRON SATN MFR SERPL: 37 % (ref 20–50)
T4 FREE SERPL-MCNC: 1.16 NG/DL (ref 0.93–1.7)
TIBC SERPL-MCNC: 367 MCG/DL (ref 298–536)
TRANSFERRIN SERPL-MCNC: 246 MG/DL (ref 200–360)
TSH SERPL DL<=0.05 MIU/L-ACNC: 1.86 UIU/ML (ref 0.27–4.2)
VIT B12 BLD-MCNC: 341 PG/ML (ref 211–946)

## 2020-09-02 ENCOUNTER — TELEPHONE (OUTPATIENT)
Dept: ENDOCRINOLOGY | Facility: CLINIC | Age: 40
End: 2020-09-02

## 2020-09-02 ENCOUNTER — TELEPHONE (OUTPATIENT)
Dept: INTERNAL MEDICINE | Age: 40
End: 2020-09-02

## 2020-09-02 NOTE — TELEPHONE ENCOUNTER
Pt needs to speak to Tesfaye's nurse saw Florence last week and needs to talk to them .Wants to hear from Carlin [phone] Did say she went to her dentist and needs to know a plan of action was all she would disclose.      Thank You

## 2020-10-05 ENCOUNTER — HOSPITAL ENCOUNTER (OUTPATIENT)
Dept: PAIN MANAGEMENT | Age: 40
Discharge: HOME | End: 2020-10-05
Payer: MEDICAID

## 2020-10-05 VITALS
TEMPERATURE: 97.3 F | DIASTOLIC BLOOD PRESSURE: 77 MMHG | HEART RATE: 64 BPM | SYSTOLIC BLOOD PRESSURE: 123 MMHG | RESPIRATION RATE: 18 BRPM

## 2020-10-05 PROCEDURE — 64615 CHEMODENERV MUSC MIGRAINE: CPT | Performed by: PSYCHIATRY & NEUROLOGY

## 2020-10-05 PROCEDURE — 64615 CHEMODENERV MUSC MIGRAINE: CPT

## 2020-10-05 PROCEDURE — 6360000002 HC RX W HCPCS

## 2020-10-05 ASSESSMENT — PAIN DESCRIPTION - LOCATION: LOCATION: SHOULDER;HEAD

## 2020-10-05 ASSESSMENT — PAIN DESCRIPTION - PAIN TYPE: TYPE: CHRONIC PAIN

## 2020-10-05 ASSESSMENT — PAIN - FUNCTIONAL ASSESSMENT: PAIN_FUNCTIONAL_ASSESSMENT: PREVENTS OR INTERFERES SOME ACTIVE ACTIVITIES AND ADLS

## 2020-10-05 ASSESSMENT — PAIN DESCRIPTION - ONSET: ONSET: AWAKENED FROM SLEEP

## 2020-10-05 ASSESSMENT — PAIN DESCRIPTION - ORIENTATION: ORIENTATION: RIGHT;LEFT

## 2020-10-05 ASSESSMENT — PAIN SCALES - GENERAL: PAINLEVEL_OUTOF10: 5

## 2020-10-05 ASSESSMENT — PAIN DESCRIPTION - FREQUENCY: FREQUENCY: CONTINUOUS

## 2020-10-05 ASSESSMENT — PAIN DESCRIPTION - PROGRESSION: CLINICAL_PROGRESSION: GRADUALLY WORSENING

## 2020-10-05 NOTE — PROCEDURES
[address]    Patient Name: Bin Hidalgo    : 1980                    Age: 38 y.o. Sex: female    Date: 2020    Pre-op Diagnosis: Chronic Migraines    Post-op Diagnosis: Chronic Migraines    Procedure: Botox injections x 155 units (45 units wasted) for migraine    Performing Procedure:  Dr Osman Barger    Patient Vitals for the past 24 hrs:   BP Temp Pulse Resp   10/05/20 1206 123/77 97.3 °F (36.3 °C) 64 18       Previous Injections:  Patient had 8 headaches/migraines over the past month. Indications:    Bin Hidalgo has been treated for problems with chronic migraine headaches. The patient was taken through a conservative course of treatment without complete resolution of symptoms. Botox injection therapy was offered and after the risks and benefits of the procedure were explained to the patient, we had agreed to proceed. The patient was taken to the procedure room and placed in the seated position. The following muscles were identified using palpation and standard landmarks. These muscles were marked and prepped with alcohol. A total of 155 units were injected after careful aspiration and the following distribution bilaterally:  10 units in 2 sites, procerus 5 units in one site, frontalis 20 units in 4 sites, temporalis 40 unit in 8 sites, occipitals 30 units in 6 sites, cervical paraspinals 20 units in 4 sites, and trapezius 30 units in 6 sites. Discharge: The patient tolerated the procedure well. There were no complications during the procedure and the patient was discharged home with discharge instructions. The patient has been instructed to contact the office should there be any complications or questions to arise between today and their next appointment.     Plan:  [] Will return to the office in   [] 1 month  [] 4 - 6 weeks  [] 8 weeks   [x] 12 weeks:  [x] Procedure Follow-up   [] Office Visit      Osman Barger MD, 10/5/2020 at 12:23 PM

## 2020-10-19 RX ORDER — LEVOTHYROXINE SODIUM 0.07 MG/1
TABLET ORAL
Qty: 30 TABLET | Refills: 8 | Status: SHIPPED | OUTPATIENT
Start: 2020-10-19 | End: 2020-11-12 | Stop reason: SDUPTHER

## 2020-11-12 RX ORDER — LEVOTHYROXINE SODIUM 0.07 MG/1
75 TABLET ORAL DAILY
Qty: 30 TABLET | Refills: 8 | Status: SHIPPED | OUTPATIENT
Start: 2020-11-12 | End: 2021-08-17

## 2020-12-28 ENCOUNTER — HOSPITAL ENCOUNTER (OUTPATIENT)
Dept: PAIN MANAGEMENT | Age: 40
Discharge: HOME | End: 2020-12-28
Payer: MEDICAID

## 2020-12-28 VITALS
TEMPERATURE: 97.1 F | HEART RATE: 70 BPM | SYSTOLIC BLOOD PRESSURE: 133 MMHG | RESPIRATION RATE: 20 BRPM | DIASTOLIC BLOOD PRESSURE: 72 MMHG | OXYGEN SATURATION: 98 %

## 2020-12-28 PROCEDURE — 64615 CHEMODENERV MUSC MIGRAINE: CPT | Performed by: PSYCHIATRY & NEUROLOGY

## 2020-12-28 PROCEDURE — 6360000002 HC RX W HCPCS

## 2020-12-28 PROCEDURE — 64615 CHEMODENERV MUSC MIGRAINE: CPT

## 2020-12-28 NOTE — PROCEDURES
[address]    Patient Name: Constance Villanueva    : 1980                    Age: 38 y.o. Sex: female    Date: 2020    Pre-op Diagnosis: Chronic Migraines    Post-op Diagnosis: Chronic Migraines    Procedure: Botox injections x 155 units (45 units wasted) for migraine    Performing Procedure:  Dr Em Anderson    Patient Vitals for the past 24 hrs:   BP Temp Temp src Pulse Resp SpO2   20 1113 133/72 97.1 °F (36.2 °C) Temporal 70 20 98 %       Previous Injections:  Patient had 7 headaches/migraines over the past month. Indications:    Constance Villanueva has been treated for problems with chronic migraine headaches. The patient was taken through a conservative course of treatment without complete resolution of symptoms. Botox injection therapy was offered and after the risks and benefits of the procedure were explained to the patient, we had agreed to proceed. The patient was taken to the procedure room and placed in the seated position. The following muscles were identified using palpation and standard landmarks. These muscles were marked and prepped with alcohol. A total of 155 units were injected after careful aspiration and the following distribution bilaterally:  10 units in 2 sites, procerus 5 units in one site, frontalis 20 units in 4 sites, temporalis 40 unit in 8 sites, occipitals 30 units in 6 sites, cervical paraspinals 20 units in 4 sites, and trapezius 30 units in 6 sites. Discharge: The patient tolerated the procedure well. There were no complications during the procedure and the patient was discharged home with discharge instructions. The patient has been instructed to contact the office should there be any complications or questions to arise between today and their next appointment.     Plan:  [] Will return to the office in   [] 1 month  [] 4 - 6 weeks  [] 8 weeks   [x] 12 weeks:  [x] Procedure Follow-up   [] Office Visit      Bk Yang MD, 12/28/2020 at 12:31 PM

## 2021-01-12 ENCOUNTER — TELEPHONE (OUTPATIENT)
Dept: ENDOCRINOLOGY | Facility: CLINIC | Age: 41
End: 2021-01-12

## 2021-01-12 ENCOUNTER — PATIENT MESSAGE (OUTPATIENT)
Dept: INTERNAL MEDICINE | Age: 41
End: 2021-01-12

## 2021-01-12 DIAGNOSIS — E06.3 HASHIMOTO'S DISEASE: Primary | ICD-10-CM

## 2021-01-12 NOTE — TELEPHONE ENCOUNTER
Leatha Weinberg with [employer]  Called and needs to speak with Dr about Patients treatment and history for 2019 and 2020    Please  Call [phone] to discuss this treatment as part of litigation.    Thank You

## 2021-01-14 ENCOUNTER — HOSPITAL ENCOUNTER (OUTPATIENT)
Dept: ULTRASOUND IMAGING | Age: 41
Discharge: HOME | End: 2021-01-14
Payer: MEDICAID

## 2021-01-14 DIAGNOSIS — E06.3 HASHIMOTO'S DISEASE: ICD-10-CM

## 2021-01-14 PROCEDURE — 76536 US EXAM OF HEAD AND NECK: CPT

## 2021-01-18 ENCOUNTER — PATIENT MESSAGE (OUTPATIENT)
Dept: INTERNAL MEDICINE | Age: 41
End: 2021-01-18

## 2021-01-19 RX ORDER — CYANOCOBALAMIN 1000 UG/ML
1000 INJECTION INTRAMUSCULAR; SUBCUTANEOUS
Qty: 1 ML | Refills: 0
Start: 2021-01-19

## 2021-01-19 RX ORDER — FOLIC ACID 1 MG/1
1 TABLET ORAL DAILY
Qty: 90 TABLET | Refills: 1 | Status: SHIPPED | OUTPATIENT
Start: 2021-01-19 | End: 2021-07-06

## 2021-01-19 NOTE — TELEPHONE ENCOUNTER
From: Francisco Lipoma  To: Elaine Lisa MD  Sent: 1/18/2021 8:54 PM CST  Subject: Test Results Question    Dr. Roxanne Alexander,    I have received information from Dr. Jeanette Murdock from [facility]. My labs from last week that I did at [facility] have come back with vitamin B12 being low along with folate. I believe a few years ago those levels were low and I tried to take OTC but it made me sick/nauseous when I took them OTC. Can you set up a day and time I can do the B12 injections and is there anything else besides OTC for the folate? Have you received the labs from [facility]? If not, I can contact her office and have them sent to you. Can your office send the thyroid ultrasound images and results from last week to Dr. Yeyo Mckeon office? I will make sure my appointment this Wednesday with the ENT, Dr. Suze Amaya, sends your office the information from the appointment as well. Thank you for all your assistance.      Daija

## 2021-01-19 NOTE — TELEPHONE ENCOUNTER
She will need to  a disk with the images. I have not seen labs from [facility]. B12 injections 1000 mg monthly.  Folic acid 1 mg po daily

## 2021-01-20 ENCOUNTER — OFFICE VISIT (OUTPATIENT)
Dept: OTOLARYNGOLOGY | Facility: CLINIC | Age: 41
End: 2021-01-20

## 2021-01-20 VITALS — OXYGEN SATURATION: 100 % | HEIGHT: 63 IN | TEMPERATURE: 98.2 F | WEIGHT: 151.4 LBS | BODY MASS INDEX: 26.82 KG/M2

## 2021-01-20 DIAGNOSIS — J37.0 CHRONIC LARYNGITIS: ICD-10-CM

## 2021-01-20 DIAGNOSIS — R09.89 CHRONIC THROAT CLEARING: ICD-10-CM

## 2021-01-20 DIAGNOSIS — K14.0 GLOSSITIS: Primary | ICD-10-CM

## 2021-01-20 PROCEDURE — 31575 DIAGNOSTIC LARYNGOSCOPY: CPT | Performed by: OTOLARYNGOLOGY

## 2021-01-20 PROCEDURE — 99204 OFFICE O/P NEW MOD 45 MIN: CPT | Performed by: OTOLARYNGOLOGY

## 2021-01-22 ENCOUNTER — NURSE ONLY (OUTPATIENT)
Dept: INTERNAL MEDICINE | Age: 41
End: 2021-01-22
Payer: MEDICAID

## 2021-01-22 DIAGNOSIS — E06.3 HASHIMOTO'S DISEASE: ICD-10-CM

## 2021-01-22 DIAGNOSIS — E53.8 VITAMIN B 12 DEFICIENCY: ICD-10-CM

## 2021-01-22 DIAGNOSIS — E06.3 HASHIMOTO'S DISEASE: Primary | ICD-10-CM

## 2021-01-22 PROCEDURE — 96372 THER/PROPH/DIAG INJ SC/IM: CPT | Performed by: INTERNAL MEDICINE

## 2021-01-22 RX ORDER — CYANOCOBALAMIN 1000 UG/ML
1000 INJECTION INTRAMUSCULAR; SUBCUTANEOUS ONCE
Status: COMPLETED | OUTPATIENT
Start: 2021-01-22 | End: 2021-01-22

## 2021-01-22 RX ADMIN — CYANOCOBALAMIN 1000 MCG: 1000 INJECTION INTRAMUSCULAR; SUBCUTANEOUS at 07:59

## 2021-01-22 NOTE — PROGRESS NOTES
"Subjective   Leatha Goldman is a 40 y.o. female.       History of Present Illness   Patient has several complaints today.  She is concerned about the appearance of her tongue.  She says since the summer 2020 her tongue has \"changed color\".  As a result of this she brushes her tongue 2-3 times a day with a toothbrush.  Its not bleeding.  She also notes indentations on the side of her tongue.  These are also not bleeding or painful.  Does report a 20 pound weight gain in the last year or 2.  Also clears her throat frequently and feels like she has mucus pooling in her throat.  Has a history of Hashimoto's and multiple thyroid nodules.  Has had several ultrasounds, both in the [facility] system and elsewhere.  Results are reviewed and show stable nodules none of which are of significant size to warrant needle biopsy.  Had previously seen Dr. Carlin but is now in [place] and seeing someone else.  Has acid reflux.  Has been prescribed famotidine but only takes it as needed.      The following portions of the patient's history were reviewed and updated as appropriate: allergies, current medications, past family history, past medical history, past social history, past surgical history and problem list.     reports that she has been smoking. She has been smoking about 1.00 pack per day. She has never used smokeless tobacco. She reports current alcohol use. She reports that she does not use drugs.   Patient is a tobacco user and has been counseled for use of tobacco products      Review of Systems        Objective   Physical Exam  General: Well-developed well-nourished female in no acute distress.  Alert and oriented x-3. Head: Normocephalic. Face: Symmetrical strength and appearance. PERRL. EOMI. Voice:Strong. Speech:Fluent  Ears: External ears no deformity, canals no discharge, tympanic membranes intact clear and mobile bilaterally.  Nose: Nares show no discharge mass polyp or purulence.  Boggy mucosa is present.  No gross " external deformity.    Oral cavity: Lips and gums without lesions.  Floor of mouth without lesions.  Tongue shows uniform whitish discoloration consistent with glossitis due to repeated trauma.  This is not infectious.  There is scalloping of the lateral tongue where the tongue encounters the dentition without evidence of mucosal breakdown.  Parotid and submandibular ducts unobstructed.  No mucosal lesions on the buccal mucosa or vestibule of the mouth.  Pharynx: No erythema exudate mass or ulcer  Neck: No lymphadenopathy.  No thyromegaly, specifically the thyroid is nonpalpable.  Trachea and larynx midline.  No masses in the parotid or submandibular glands.  Procedure Note    Pre-operative Diagnosis: Patient presents with:  Sore Throat      Post-operative Diagnosis: Chronic laryngitis    Anesthesia: Topical with Xylocaine and Dalton-Synephrine    Endoscopy Type:  Flexible Laryngoscopy    Procedure Details:    The patient was placed in the sitting position.  After topical anesthesia and decongestion, the 4 mm laryngoscope was passed.  The nasal cavities, nasopharynx, oropharynx, hypopharynx, and larynx were all examined.  Vocal cords were examined during respiration and phonation.  The following findings were noted:    Findings: Previously noted nasal findings were confirmed. Nasopharynx without mass, hypopharynx and larynx without evidence of neoplasm. Vocal cord mobility intact. There is chronic appearing edema and erythema of the laryngeal structures consistent with chronic laryngitis.    Condition:  Stable.  Patient tolerated procedure well.    Complications:  None    Assessment/Plan   Diagnoses and all orders for this visit:    1. Glossitis (Primary)    2. Chronic throat clearing    3. Chronic laryngitis        Plan: Reassured the patient that her tongue was not infected nor neoplastic.  Suggested she stop brushing her tongue as she is likely traumatically exfoliating the tongue and causing increased turnover of  the epithelium.  Explained that the scalloping may be due to weight gain and increased size of the tongue within the dental arch.  Again reassurance that I saw no evidence of neoplasm.  I think her throat clearing is due to irritation of the larynx from a combination of smoking and acid reflux.  Strongly urged smoking cessation.  Also encouraged her to use her famotidine every day.  She says she does not need a new prescription for this.  Advise return in 3 months at the [place] office, call sooner for problems.

## 2021-02-17 DIAGNOSIS — E55.9 VITAMIN D DEFICIENCY: ICD-10-CM

## 2021-02-17 DIAGNOSIS — I10 ESSENTIAL HYPERTENSION: ICD-10-CM

## 2021-02-17 DIAGNOSIS — E53.8 VITAMIN B 12 DEFICIENCY: Primary | ICD-10-CM

## 2021-02-17 DIAGNOSIS — E06.3 HASHIMOTO'S DISEASE: ICD-10-CM

## 2021-02-17 LAB
ALBUMIN SERPL-MCNC: 4.4 G/DL (ref 3.5–5.2)
ALP BLD-CCNC: 65 U/L (ref 35–104)
ALT SERPL-CCNC: 8 U/L (ref 5–33)
ANION GAP SERPL CALCULATED.3IONS-SCNC: 10 MMOL/L (ref 7–19)
AST SERPL-CCNC: 11 U/L (ref 5–32)
BASOPHILS ABSOLUTE: 0.1 K/UL (ref 0–0.2)
BASOPHILS RELATIVE PERCENT: 0.5 % (ref 0–1)
BILIRUB SERPL-MCNC: 0.6 MG/DL (ref 0.2–1.2)
BUN BLDV-MCNC: 10 MG/DL (ref 6–20)
CALCIUM SERPL-MCNC: 9.1 MG/DL (ref 8.6–10)
CHLORIDE BLD-SCNC: 103 MMOL/L (ref 98–111)
CHOLESTEROL, TOTAL: 176 MG/DL (ref 160–199)
CO2: 25 MMOL/L (ref 22–29)
CREAT SERPL-MCNC: 0.8 MG/DL (ref 0.5–0.9)
EOSINOPHILS ABSOLUTE: 0.5 K/UL (ref 0–0.6)
EOSINOPHILS RELATIVE PERCENT: 5.3 % (ref 0–5)
GFR AFRICAN AMERICAN: >59
GFR NON-AFRICAN AMERICAN: >60
GLUCOSE BLD-MCNC: 85 MG/DL (ref 74–109)
HCT VFR BLD CALC: 44.3 % (ref 37–47)
HDLC SERPL-MCNC: 48 MG/DL (ref 65–121)
HEMOGLOBIN: 14.5 G/DL (ref 12–16)
IMMATURE GRANULOCYTES #: 0 K/UL
LDL CHOLESTEROL CALCULATED: 107 MG/DL
LYMPHOCYTES ABSOLUTE: 2.4 K/UL (ref 1.1–4.5)
LYMPHOCYTES RELATIVE PERCENT: 26.3 % (ref 20–40)
MCH RBC QN AUTO: 32.7 PG (ref 27–31)
MCHC RBC AUTO-ENTMCNC: 32.7 G/DL (ref 33–37)
MCV RBC AUTO: 99.8 FL (ref 81–99)
MONOCYTES ABSOLUTE: 0.6 K/UL (ref 0–0.9)
MONOCYTES RELATIVE PERCENT: 6.4 % (ref 0–10)
NEUTROPHILS ABSOLUTE: 5.7 K/UL (ref 1.5–7.5)
NEUTROPHILS RELATIVE PERCENT: 61.2 % (ref 50–65)
PDW BLD-RTO: 13 % (ref 11.5–14.5)
PLATELET # BLD: 292 K/UL (ref 130–400)
PMV BLD AUTO: 10.3 FL (ref 9.4–12.3)
POTASSIUM SERPL-SCNC: 4.1 MMOL/L (ref 3.5–5)
RBC # BLD: 4.44 M/UL (ref 4.2–5.4)
SODIUM BLD-SCNC: 138 MMOL/L (ref 136–145)
TOTAL PROTEIN: 7 G/DL (ref 6.6–8.7)
TRIGL SERPL-MCNC: 105 MG/DL (ref 0–149)
TSH SERPL DL<=0.05 MIU/L-ACNC: 2.18 UIU/ML (ref 0.27–4.2)
VITAMIN D 25-HYDROXY: 28.6 NG/ML
WBC # BLD: 9.3 K/UL (ref 4.8–10.8)

## 2021-02-22 ENCOUNTER — OFFICE VISIT (OUTPATIENT)
Dept: INTERNAL MEDICINE | Age: 41
End: 2021-02-22
Payer: MEDICAID

## 2021-02-22 VITALS
SYSTOLIC BLOOD PRESSURE: 124 MMHG | HEIGHT: 63 IN | HEART RATE: 82 BPM | WEIGHT: 148 LBS | RESPIRATION RATE: 20 BRPM | OXYGEN SATURATION: 99 % | BODY MASS INDEX: 26.22 KG/M2 | DIASTOLIC BLOOD PRESSURE: 76 MMHG

## 2021-02-22 DIAGNOSIS — Z00.00 ROUTINE ADULT HEALTH MAINTENANCE: Primary | ICD-10-CM

## 2021-02-22 DIAGNOSIS — E55.9 VITAMIN D DEFICIENCY: ICD-10-CM

## 2021-02-22 DIAGNOSIS — M79.7 FIBROMYALGIA: ICD-10-CM

## 2021-02-22 DIAGNOSIS — R53.83 OTHER FATIGUE: ICD-10-CM

## 2021-02-22 DIAGNOSIS — E53.8 B12 DEFICIENCY: ICD-10-CM

## 2021-02-22 DIAGNOSIS — E06.3 HASHIMOTO'S DISEASE: ICD-10-CM

## 2021-02-22 DIAGNOSIS — E03.9 HYPOTHYROIDISM, UNSPECIFIED TYPE: ICD-10-CM

## 2021-02-22 DIAGNOSIS — K21.9 GASTROESOPHAGEAL REFLUX DISEASE WITHOUT ESOPHAGITIS: ICD-10-CM

## 2021-02-22 DIAGNOSIS — G43.809 OTHER MIGRAINE WITHOUT STATUS MIGRAINOSUS, NOT INTRACTABLE: ICD-10-CM

## 2021-02-22 DIAGNOSIS — I10 ESSENTIAL HYPERTENSION: ICD-10-CM

## 2021-02-22 DIAGNOSIS — F32.89 OTHER DEPRESSION: ICD-10-CM

## 2021-02-22 DIAGNOSIS — R10.9 ABDOMINAL CRAMPING: ICD-10-CM

## 2021-02-22 DIAGNOSIS — Z91.09 ENVIRONMENTAL ALLERGIES: ICD-10-CM

## 2021-02-22 DIAGNOSIS — F41.9 ANXIETY DISORDER, UNSPECIFIED TYPE: ICD-10-CM

## 2021-02-22 DIAGNOSIS — R60.0 LOWER EXTREMITY EDEMA: ICD-10-CM

## 2021-02-22 PROCEDURE — 96372 THER/PROPH/DIAG INJ SC/IM: CPT | Performed by: INTERNAL MEDICINE

## 2021-02-22 PROCEDURE — 99396 PREV VISIT EST AGE 40-64: CPT | Performed by: INTERNAL MEDICINE

## 2021-02-22 PROCEDURE — G8484 FLU IMMUNIZE NO ADMIN: HCPCS | Performed by: INTERNAL MEDICINE

## 2021-02-22 RX ORDER — FAMOTIDINE 20 MG/1
20 TABLET, FILM COATED ORAL 2 TIMES DAILY PRN
Qty: 180 TABLET | Refills: 3 | Status: SHIPPED | OUTPATIENT
Start: 2021-02-22 | End: 2022-02-21

## 2021-02-22 RX ORDER — CYANOCOBALAMIN 1000 UG/ML
1000 INJECTION INTRAMUSCULAR; SUBCUTANEOUS ONCE
Status: COMPLETED | OUTPATIENT
Start: 2021-02-22 | End: 2021-02-22

## 2021-02-22 RX ORDER — NARATRIPTAN 2.5 MG/1
2.5 TABLET ORAL SEE ADMIN INSTRUCTIONS
Qty: 9 TABLET | Refills: 5 | Status: SHIPPED | OUTPATIENT
Start: 2021-02-22 | End: 2021-05-12 | Stop reason: CLARIF

## 2021-02-22 RX ADMIN — CYANOCOBALAMIN 1000 MCG: 1000 INJECTION INTRAMUSCULAR; SUBCUTANEOUS at 10:01

## 2021-02-22 ASSESSMENT — PATIENT HEALTH QUESTIONNAIRE - PHQ9
SUM OF ALL RESPONSES TO PHQ QUESTIONS 1-9: 2
2. FEELING DOWN, DEPRESSED OR HOPELESS: 1

## 2021-02-22 NOTE — PROGRESS NOTES
Chief Complaint   Patient presents with    6 Month Follow-Up       HPI: Rudolph Morton is a 38 y.o. female is here for her annual physical exam.  Her functional status is good. She does not have any recent falls. She has no concerns in regards to safety, hearing, or cognition. She does see Dr. Dexter Pang  for history of fibromyalgia. She sees Dr. Codie Beavers for a history of migraines. She sees Dr. Miley Bravo for voicebox inflammation. She also goes to [facility] and sees Dr. Jared Jaquez for history of Hashimoto's thyroiditis. Migraines are better. She is getting 5-6 a month versus 1 almost all the day. She still hurts all over with her fibromyalgia. Dr. Dexter Pang does give her trigger point injections which do help. She does complain of some fatigue. She would like a B12 injection. Her thyroid function is stable. Occasionally, she has abdominal cramping. Bentyl helps with this. Her depression is stable her current medication regimen. She does take Lasix for lower extremity swelling which tends to work well. Her anxiety is stable.     Routine Screening is as follows:  Eye exam yearly  Flu vaccine decined  Pap 8/2020 per Dr. Nba Phillips done at Dr. Graciela Garcia office this year    Past Medical History:   Diagnosis Date    Fibromyalgia     Fluid retention     H/O Raynaud's syndrome     Heartburn     Hypothyroidism due to Hashimoto's thyroiditis 9/19/2019    Labial cyst     sebaceous cyst and skin tag    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    Spastic colon     Vitamin D deficiency       Past Surgical History:   Procedure Laterality Date    APPENDECTOMY      CHOLECYSTECTOMY      COLONOSCOPY  2010    OVARY REMOVAL      left    IL LYSIS OF LABIAL ADHESIONS N/A 2/16/2017    INCISION AND DRAINAGE OF SEBACEOUS CYST ON LABIA; SKIN TAG REMOVAL OF PERINEUM  performed by Laura Ladd MD at [address] [place] [address] ENDOSCOPY      VULVA SURGERY N/A 2/16/2017    LABIALPLASTY  performed by Silas Phoenix MD at [facility] History     Socioeconomic History    Marital status:      Spouse name: None    Number of children: None    Years of education: None    Highest education level: None   Occupational History    None   Social Needs    Financial resource strain: None    Food insecurity     Worry: None     Inability: None    Transportation needs     Medical: None     Non-medical: None   Tobacco Use    Smoking status: Current Every Day Smoker     Packs/day: 0.50     Years: 10.00     Pack years: 5.00     Types: Cigarettes    Smokeless tobacco: Never Used   Substance and Sexual Activity    Alcohol use:  Yes     Alcohol/week: 1.0 standard drinks     Types: 1 Shots of liquor per week     Comment: 2 per month    Drug use: No    Sexual activity: Not Currently     Partners: Male   Lifestyle    Physical activity     Days per week: None     Minutes per session: None    Stress: None   Relationships    Social connections     Talks on phone: None     Gets together: None     Attends Baptism service: None     Active member of club or organization: None     Attends meetings of clubs or organizations: None     Relationship status: None    Intimate partner violence     Fear of current or ex partner: None     Emotionally abused: None     Physically abused: None     Forced sexual activity: None   Other Topics Concern    None   Social History Narrative    None      Family History   Problem Relation Age of Onset    Breast Cancer Mother 61    Cancer Maternal Aunt         breast unsure of age   Collette Satkylerl Cancer Paternal Aunt         breast unsure of age   Collette Satchel Stroke Maternal Grandmother     Stroke Maternal Grandfather     Cancer Paternal Aunt         breast unsure of age        Current Outpatient Medications   Medication Sig Dispense Refill    famotidine (PEPCID) 20 MG tablet Take 1 tablet by mouth 2 times daily as needed (reflux) 180 tablet 3    naratriptan (AMERGE) 2.5 MG tablet Take 1 tablet by mouth See Admin Instructions Take one (1) tablet at the onset of headache; if headache returns or does not fully resolve, the dose may be repeated after 4 hours; do not exceed five (5)mg in 24 hours. 9 tablet 5    cyanocobalamin 1000 MCG/ML injection Inject 1 mL into the muscle every 30 days 1 mL 0    folic acid (FOLVITE) 1 MG tablet Take 1 tablet by mouth daily 90 tablet 1    levothyroxine (SYNTHROID) 75 MCG tablet Take 1 tablet by mouth Daily 30 tablet 8    dicyclomine (BENTYL) 20 MG tablet TAKE 1 TABLET BY MOUTH 2 TIMES DAILY AS NEEDED (ABDOMINAL PAIN) 60 tablet 5    vitamin D (ERGOCALCIFEROL) 1.25 MG (86184 UT) CAPS capsule TAKE ONE CAPSULE BY MOUTH WEEKLY 4 capsule 5    tiZANidine (ZANAFLEX) 4 MG tablet Take 4 mg by mouth 1 QAM 2 QPM       azelastine HCl 0.15 % SOLN USE 2 SPRAYS TWICE A DAY AS NEEDED 30 mL 3    DULoxetine (CYMBALTA) 60 MG extended release capsule Take 60 mg by mouth daily      busPIRone (BUSPAR) 5 MG tablet Take 5 mg by mouth 2 times daily       furosemide (LASIX) 20 MG tablet Take 20 mg by mouth daily       ZOLMitriptan (ZOMIG) 5 MG tablet Take 1 tablet by mouth once as needed for Migraine 9 tablet 5     No current facility-administered medications for this visit. Patient Active Problem List   Diagnosis    Ovarian cyst    Dyspareunia, female    Labial hypertrophy    Vaginal inclusion cyst    Vulvar skin tag    Thyroid nodule    Hypothyroidism due to Hashimoto's thyroiditis    Migraine without status migrainosus, not intractable    Pain, neck    Photophobia    Fibromyalgia        Review of Systems   Constitutional: Negative for activity change, appetite change, chills, diaphoresis, fatigue, fever and unexpected weight change.    HENT: Negative for congestion, ear pain, facial swelling, hearing loss, mouth sores, nosebleeds, postnasal drip, rhinorrhea, sinus pressure, sneezing, sore throat, tinnitus, trouble swallowing and voice change. Eyes: Negative for photophobia, pain, discharge, redness, itching and visual disturbance. Respiratory: Negative for cough, choking, chest tightness, shortness of breath and wheezing. Cardiovascular: Negative for chest pain, palpitations and leg swelling. Gastrointestinal: Negative for abdominal distention, abdominal pain, anal bleeding, blood in stool, constipation, diarrhea, nausea, rectal pain and vomiting. Endocrine: Negative for cold intolerance, heat intolerance, polydipsia, polyphagia and polyuria. Genitourinary: Negative for decreased urine volume, difficulty urinating, dysuria, flank pain, frequency, hematuria and urgency. Musculoskeletal: Positive for myalgias. Negative for arthralgias, back pain, gait problem, joint swelling, neck pain and neck stiffness. Aches and hurts all over from fibromyalgia   Skin: Negative for color change, pallor and rash. Allergic/Immunologic: Negative for environmental allergies and food allergies. Neurological: Positive for headaches. Negative for dizziness, tremors, syncope, weakness, light-headedness and numbness. Hematological: Negative for adenopathy. Does not bruise/bleed easily. Psychiatric/Behavioral: Negative for agitation, behavioral problems, confusion, decreased concentration, dysphoric mood, hallucinations, self-injury, sleep disturbance and suicidal ideas. The patient is not nervous/anxious and is not hyperactive. /76   Pulse 82   Resp 20   Ht 5' 3\" (1.6 m)   Wt 148 lb (67.1 kg)   SpO2 99%   BMI 26.22 kg/m²   Physical Exam  Vitals signs and nursing note reviewed. Constitutional:       General: She is not in acute distress. Appearance: Normal appearance. She is well-developed and normal weight. She is not ill-appearing, toxic-appearing or diaphoretic. HENT:      Head: Normocephalic and atraumatic.       Right Ear: Tympanic membrane, ear canal and external ear normal. There is no impacted cerumen. Left Ear: Tympanic membrane, ear canal and external ear normal. There is no impacted cerumen. Nose: Nose normal. No congestion or rhinorrhea. Mouth/Throat:      Mouth: Mucous membranes are moist.      Pharynx: Oropharynx is clear. No oropharyngeal exudate or posterior oropharyngeal erythema. Eyes:      General: No scleral icterus. Right eye: No discharge. Left eye: No discharge. Extraocular Movements: Extraocular movements intact. Conjunctiva/sclera: Conjunctivae normal.      Pupils: Pupils are equal, round, and reactive to light. Neck:      Musculoskeletal: Normal range of motion and neck supple. No neck rigidity or muscular tenderness. Thyroid: No thyromegaly. Vascular: No carotid bruit or JVD. Trachea: No tracheal deviation. Cardiovascular:      Rate and Rhythm: Normal rate and regular rhythm. Pulses: Normal pulses. Heart sounds: Normal heart sounds. No murmur. No friction rub. No gallop. Pulmonary:      Effort: Pulmonary effort is normal. No respiratory distress. Breath sounds: Normal breath sounds. No stridor. No wheezing, rhonchi or rales. Chest:      Chest wall: No tenderness. Abdominal:      General: Bowel sounds are normal. There is no distension. Palpations: Abdomen is soft. There is no mass. Tenderness: There is no abdominal tenderness. There is no right CVA tenderness, left CVA tenderness, guarding or rebound. Hernia: No hernia is present. Musculoskeletal: Normal range of motion. General: No swelling, tenderness, deformity or signs of injury. Right lower leg: No edema. Left lower leg: No edema. Lymphadenopathy:      Cervical: No cervical adenopathy. Skin:     General: Skin is warm and dry. Capillary Refill: Capillary refill takes less than 2 seconds. Coloration: Skin is not jaundiced or pale.       Findings: No bruising, erythema, lesion or rash. Neurological:      General: No focal deficit present. Mental Status: She is alert and oriented to person, place, and time. Mental status is at baseline. Cranial Nerves: No cranial nerve deficit. Sensory: No sensory deficit. Motor: No weakness or abnormal muscle tone. Coordination: Coordination normal.      Gait: Gait normal.      Deep Tendon Reflexes: Reflexes are normal and symmetric. Reflexes normal.   Psychiatric:         Mood and Affect: Mood normal.         Behavior: Behavior normal.         Thought Content: Thought content normal.         Judgment: Judgment normal.         1. Routine adult health maintenance    2. B12 deficiency    3. Vitamin D deficiency    4. Hashimoto's disease    5. Other fatigue    6. Gastroesophageal reflux disease without esophagitis    7. Other migraine without status migrainosus, not intractable    8. Hypothyroidism, unspecified type    9. Abdominal cramping    10. Fibromyalgia    11. Environmental allergies    12. Other depression    13. Anxiety disorder, unspecified type    14. Essential hypertension    15. Lower extremity edema        ASSESSMENT/PLAN:    41-year-old woman here for her annual physical exam    1. Health maintenance: Routine screening is as per HPI. Labs discussed with patient today    2. Fatigue: B12 injection ordered for today    3. Acid reflux: Stable on Pepcid    4. Migraines: Continue Amerge as needed    5. Hypothyroidism/ hashimoto's thyroiditis: Continue Synthroid as prescribed    6. Abdominal cramping: Bentyl as needed    7. Vitamin D deficiency: Continue vitamin D supplementation with ergocalciferol    8. Fibromyalgia: Continue Zanaflex and trigger point injections as needed    9. Environmental allergies,: Continue Astelin nasal spray    10. Depression: Continue Cymbalta    11. Anxiety: BuSpar as needed    12.  Hypertension/lower extremity swelling: Continue Lasix as prescribed        Daija was seen today for 6 month follow-up. Diagnoses and all orders for this visit:    Routine adult health maintenance    B12 deficiency  -     Vitamin B12; Future    Vitamin D deficiency  -     Vitamin D 25 Hydroxy; Future    Hashimoto's disease  -     CBC Auto Differential; Future  -     Comprehensive Metabolic Panel; Future  -     Lipid Panel; Future  -     TSH without Reflex; Future    Other fatigue    Gastroesophageal reflux disease without esophagitis    Other migraine without status migrainosus, not intractable    Hypothyroidism, unspecified type    Abdominal cramping    Fibromyalgia    Environmental allergies    Other depression    Anxiety disorder, unspecified type    Essential hypertension    Lower extremity edema    Other orders  -     famotidine (PEPCID) 20 MG tablet; Take 1 tablet by mouth 2 times daily as needed (reflux)  -     naratriptan (AMERGE) 2.5 MG tablet; Take 1 tablet by mouth See Admin Instructions Take one (1) tablet at the onset of headache; if headache returns or does not fully resolve, the dose may be repeated after 4 hours; do not exceed five (5)mg in 24 hours. -     cyanocobalamin injection 1,000 mcg          Return in about 6 months (around 8/22/2021) for hypothyroidism.      Orders Placed This Encounter   Procedures    CBC Auto Differential     Fast 12 hours     Standing Status:   Future     Standing Expiration Date:   2/22/2022    Comprehensive Metabolic Panel     Fasting 12 hours     Standing Status:   Future     Standing Expiration Date:   2/22/2022    Lipid Panel     Standing Status:   Future     Standing Expiration Date:   2/22/2022     Order Specific Question:   Is Patient Fasting?/# of Hours     Answer:   12    TSH without Reflex     Fast 12 hours     Standing Status:   Future     Standing Expiration Date:   2/22/2022    Vitamin D 25 Hydroxy     Standing Status:   Future     Standing Expiration Date:   2/22/2022    Vitamin B12     Standing Status:   Future     Standing Expiration Date: 2/22/2022       Jodie Hanna MD

## 2021-02-22 NOTE — PATIENT INSTRUCTIONS
· Learn about fibromyalgia. This makes coping easier. Then, take an active role in your treatment. · Think about joining a support group with others who have fibromyalgia to learn more and get support. When should you call for help? Watch closely for changes in your health, and be sure to contact your doctor if:    · You feel sad, helpless, or hopeless; lose interest in things you used to enjoy; or have other symptoms of depression.     · Your fibromyalgia symptoms get worse. Where can you learn more? Go to [email or web address]. org and sign in to your [employer] account. Enter V003 in the [employer] box to learn more about \"Fibromyalgia: Care Instructions. \"     If you do not have an account, please click on the \"Sign Up Now\" link. Current as of: November 20, 2019               Content Version: 12.6  © 1267-9180 [employer]. Care instructions adapted under license by [facility] ([facility]). If you have questions about a medical condition or this instruction, always ask your healthcare professional. [address] any warranty or liability for your use of this information.

## 2021-03-01 ASSESSMENT — ENCOUNTER SYMPTOMS
PHOTOPHOBIA: 0
WHEEZING: 0
CHOKING: 0
SHORTNESS OF BREATH: 0
RECTAL PAIN: 0
COLOR CHANGE: 0
SINUS PRESSURE: 0
NAUSEA: 0
CONSTIPATION: 0
BLOOD IN STOOL: 0
ANAL BLEEDING: 0
ABDOMINAL PAIN: 0
VOICE CHANGE: 0
RHINORRHEA: 0
EYE DISCHARGE: 0
EYE PAIN: 0
COUGH: 0
ABDOMINAL DISTENTION: 0
FACIAL SWELLING: 0
EYE REDNESS: 0
DIARRHEA: 0
BACK PAIN: 0
CHEST TIGHTNESS: 0
TROUBLE SWALLOWING: 0
VOMITING: 0
EYE ITCHING: 0
SORE THROAT: 0

## 2021-03-19 RX ORDER — ERGOCALCIFEROL 1.25 MG/1
CAPSULE ORAL
Qty: 4 CAPSULE | Refills: 5 | Status: SHIPPED | OUTPATIENT
Start: 2021-03-19 | End: 2021-11-19 | Stop reason: SDUPTHER

## 2021-03-19 NOTE — TELEPHONE ENCOUNTER
Daija called requesting a refill of the below medication which has been pended for you:     Requested Prescriptions     Pending Prescriptions Disp Refills    vitamin D (ERGOCALCIFEROL) 1.25 MG (13495 UT) CAPS capsule [Pharmacy Med Name: VITAMIN D2 1.25MG(50,000 UNIT)] 4 capsule 5     Sig: TAKE 1 CAPSULE BY MOUTH ONE TIME PER WEEK       Last Appointment Date: 2/22/2021  Next Appointment Date: 8/23/2021    Allergies   Allergen Reactions    Armodafinil Rash     Thrush and mouth ulcers  Thrush and mouth ulcers

## 2021-03-22 ENCOUNTER — NURSE ONLY (OUTPATIENT)
Dept: INTERNAL MEDICINE | Age: 41
End: 2021-03-22
Payer: MEDICAID

## 2021-03-22 DIAGNOSIS — E53.8 B12 DEFICIENCY: Primary | ICD-10-CM

## 2021-03-22 PROCEDURE — 96372 THER/PROPH/DIAG INJ SC/IM: CPT | Performed by: INTERNAL MEDICINE

## 2021-03-22 RX ORDER — BUSPIRONE HYDROCHLORIDE 10 MG/1
10 TABLET ORAL 2 TIMES DAILY
COMMUNITY
Start: 2021-02-24

## 2021-03-22 RX ORDER — CYANOCOBALAMIN 1000 UG/ML
1000 INJECTION INTRAMUSCULAR; SUBCUTANEOUS ONCE
Status: COMPLETED | OUTPATIENT
Start: 2021-03-22 | End: 2021-03-22

## 2021-03-22 RX ORDER — CHOLECALCIFEROL (VITAMIN D3) 125 MCG
CAPSULE ORAL
COMMUNITY
Start: 2021-03-19 | End: 2021-05-12 | Stop reason: CLARIF

## 2021-03-22 RX ADMIN — CYANOCOBALAMIN 1000 MCG: 1000 INJECTION INTRAMUSCULAR; SUBCUTANEOUS at 14:00

## 2021-03-22 NOTE — PROGRESS NOTES
After obtaining consent, and per orders of Dr. Roxanne Alexander, injection of B12 given in Left deltoid by Danny Krause. Patient instructed to remain in clinic for 20 minutes afterwards, and to report any adverse reaction to me immediately.

## 2021-03-29 ENCOUNTER — HOSPITAL ENCOUNTER (OUTPATIENT)
Dept: PAIN MANAGEMENT | Age: 41
Discharge: HOME | End: 2021-03-29
Payer: MEDICAID

## 2021-03-29 VITALS
RESPIRATION RATE: 18 BRPM | SYSTOLIC BLOOD PRESSURE: 109 MMHG | HEART RATE: 59 BPM | OXYGEN SATURATION: 99 % | DIASTOLIC BLOOD PRESSURE: 59 MMHG | TEMPERATURE: 97 F

## 2021-03-29 PROCEDURE — 64615 CHEMODENERV MUSC MIGRAINE: CPT | Performed by: PSYCHIATRY & NEUROLOGY

## 2021-03-29 PROCEDURE — 6360000002 HC RX W HCPCS

## 2021-03-29 PROCEDURE — 64615 CHEMODENERV MUSC MIGRAINE: CPT

## 2021-03-29 NOTE — PROCEDURES
[address]    Patient Name: Yanelis Stevenson    : 1980                    Age: 38 y.o. Sex: female    Date: 2021    Pre-op Diagnosis: Chronic Migraines    Post-op Diagnosis: Chronic Migraines    Procedure: Botox injections x 155 units (45 units wasted) for migraine    Performing Procedure:  Dr Alisha Casiano    Patient Vitals for the past 24 hrs:   BP Temp Temp src Pulse Resp SpO2   21 1002 (!) 109/59 97 °F (36.1 °C) Temporal 59 18 99 %       Previous Injections:  Patient had 4 migraines and 6 headaches over the past month. At least 100 units or greater of Botox was used. The patient had reduction in migraines and was able to increase their activity level post treatment with Botox    Indications:    Yanelis Stevenson has been treated for problems with chronic migraine headaches. The patient was taken through a conservative course of treatment without complete resolution of symptoms. Botox injection therapy was offered and after the risks and benefits of the procedure were explained to the patient, we had agreed to proceed. The patient was taken to the procedure room and placed in the seated position. The following muscles were identified using palpation and standard landmarks. These muscles were marked and prepped with alcohol. A total of 155 units were injected after careful aspiration and the following distribution bilaterally:  10 units in 2 sites, procerus 5 units in one site, frontalis 20 units in 4 sites, temporalis 40 unit in 8 sites, occipitals 30 units in 6 sites, cervical paraspinals 20 units in 4 sites, and trapezius 30 units in 6 sites. Discharge: The patient tolerated the procedure well. There were no complications during the procedure and the patient was discharged home with discharge instructions.     The patient has been instructed to contact the office should there be any complications or questions to arise between today and their next appointment.     Plan:  [] Will return to the office in   [] 1 month  [] 4 - 6 weeks  [] 8 weeks   [x] 12 weeks:  [x] Procedure Follow-up   [] Office Visit      Christina Blank MD, 3/29/2021 at 11:04 AM

## 2021-04-22 ENCOUNTER — OFFICE VISIT (OUTPATIENT)
Dept: URGENT CARE | Age: 41
End: 2021-04-22
Payer: MEDICAID

## 2021-04-22 ENCOUNTER — NURSE TRIAGE (OUTPATIENT)
Dept: OTHER | Facility: CLINIC | Age: 41
End: 2021-04-22

## 2021-04-22 VITALS
WEIGHT: 149 LBS | DIASTOLIC BLOOD PRESSURE: 82 MMHG | BODY MASS INDEX: 26.39 KG/M2 | SYSTOLIC BLOOD PRESSURE: 125 MMHG | HEART RATE: 71 BPM | OXYGEN SATURATION: 97 % | TEMPERATURE: 97.8 F | RESPIRATION RATE: 16 BRPM

## 2021-04-22 DIAGNOSIS — R06.02 SHORTNESS OF BREATH: ICD-10-CM

## 2021-04-22 DIAGNOSIS — R09.81 HEAD CONGESTION: ICD-10-CM

## 2021-04-22 DIAGNOSIS — R05.9 COUGH: Primary | ICD-10-CM

## 2021-04-22 DIAGNOSIS — R68.83 CHILLS: ICD-10-CM

## 2021-04-22 PROCEDURE — 4004F PT TOBACCO SCREEN RCVD TLK: CPT | Performed by: NURSE PRACTITIONER

## 2021-04-22 PROCEDURE — G8419 CALC BMI OUT NRM PARAM NOF/U: HCPCS | Performed by: NURSE PRACTITIONER

## 2021-04-22 PROCEDURE — 99213 OFFICE O/P EST LOW 20 MIN: CPT | Performed by: NURSE PRACTITIONER

## 2021-04-22 PROCEDURE — G8427 DOCREV CUR MEDS BY ELIG CLIN: HCPCS | Performed by: NURSE PRACTITIONER

## 2021-04-22 ASSESSMENT — ENCOUNTER SYMPTOMS
NAUSEA: 0
EYES NEGATIVE: 1
ABDOMINAL PAIN: 0
DIARRHEA: 0
COUGH: 1
SINUS PAIN: 0
VOMITING: 0
SORE THROAT: 1
SINUS PRESSURE: 0
SHORTNESS OF BREATH: 1

## 2021-04-22 ASSESSMENT — VISUAL ACUITY: OU: 1

## 2021-04-22 NOTE — PROGRESS NOTES
[address] [facility]  [address] [address] [place] [address]  Dept: [phone]  Dept Fax: [phone]  Loc: [phone]    Nicki Montalvo is a 38 y.o. female who presents today for her medical conditions/complaintsas noted below. Nicki Montalvo is c/o of Congestion (0nset 1 week ago, pt reports she is having a flare up of several auto immune diseases she has), Pharyngitis (swelling), and Nasal Congestion (head congestion )        HPI:     Pharyngitis  This is a new problem. Episode onset: Since Tuesday. The problem occurs constantly. The problem has been waxing and waning. Associated symptoms include anorexia, arthralgias, chills, congestion, coughing, fatigue, headaches, myalgias and a sore throat. Pertinent negatives include no abdominal pain, fever, nausea, rash or vomiting. Associated symptoms comments: Shortness of breath. Nothing aggravates the symptoms. She has tried rest for the symptoms. The treatment provided mild relief. Daija tells me she feels like she is having a flare up of her autoimmune diseases and she had a migraine headache for 4-5 days and her appetite is poor. She had some white spots on her tongue and posterior pharynx on Tuesday but thinks they now may be gone. She does have a history of pneumonia but has not had fever or productive cough. She denies known sick contacts. She is using Flonase on occasion for sinus drainage.   Past Medical History:   Diagnosis Date    Fibromyalgia     Fluid retention     H/O Raynaud's syndrome     Heartburn     Hypothyroidism due to Hashimoto's thyroiditis 9/19/2019    Labial cyst     sebaceous cyst and skin tag    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    Spastic colon     Vitamin D deficiency      Past Surgical History:   Procedure Laterality Date    APPENDECTOMY      CHOLECYSTECTOMY      COLONOSCOPY  2010    OVARY REMOVAL      left    MI LYSIS OF LABIAL ADHESIONS N/A 2/16/2017    INCISION AND DRAINAGE OF SEBACEOUS CYST ON LABIA; SKIN TAG REMOVAL OF PERINEUM  performed by Elaine Pardo MD at [address]      TYMPANOSTOMY TUBE PLACEMENT      UPPER GASTROINTESTINAL ENDOSCOPY      VULVA SURGERY N/A 2/16/2017    LABIALPLASTY  performed by [facility]ardo [facility] at [facility] OR       Family History   Problem Relation Age of Onset    Breast Cancer Mother 61    Cancer Maternal Aunt         breast unsure of age   Daisy Manual Cancer Paternal [de-identified]         breast unsure of age   Daisy Manual Stroke Maternal Grandmother     Stroke Maternal Grandfather     Cancer Paternal Aunt         breast unsure of age       Social History     Tobacco Use    Smoking status: Current Every Day Smoker     Packs/day: 0.50     Years: 10.00     Pack years: 5.00     Types: Cigarettes    Smokeless tobacco: Never Used   Substance Use Topics    Alcohol use: Yes     Alcohol/week: 1.0 standard drinks     Types: 1 Shots of liquor per week     Comment: 2 per month      Current Outpatient Medications   Medication Sig Dispense Refill    busPIRone (BUSPAR) 10 MG tablet TAKE 1/2 1 TABLET BY MOUTH TWICE A DAY AS NEEDED      [employer] D3 125 MCG (5000 UT) CAPS capsule       vitamin D (ERGOCALCIFEROL) 1.25 MG (39067 UT) CAPS capsule TAKE 1 CAPSULE BY MOUTH ONE TIME PER WEEK 4 capsule 5    famotidine (PEPCID) 20 MG tablet Take 1 tablet by mouth 2 times daily as needed (reflux) 180 tablet 3    naratriptan (AMERGE) 2.5 MG tablet Take 1 tablet by mouth See Admin Instructions Take one (1) tablet at the onset of headache; if headache returns or does not fully resolve, the dose may be repeated after 4 hours; do not exceed five (5)mg in 24 hours.  9 tablet 5    cyanocobalamin 1000 MCG/ML injection Inject 1 mL into the muscle every 30 days 1 mL 0    folic acid (FOLVITE) 1 MG tablet Take 1 tablet by mouth daily 90 tablet 1    levothyroxine (SYNTHROID) 75 MCG tablet Take 1 tablet by mouth Daily 30 tablet 8    dicyclomine (BENTYL) 20 MG tablet TAKE 1 TABLET BY MOUTH 2 TIMES Positive for headaches.       :Objective      Physical Exam  Vitals signs and nursing note reviewed. Constitutional:       General: She is awake. She is not in acute distress. Appearance: Normal appearance. She is well-developed, well-groomed and normal weight. She is not ill-appearing. HENT:      Head: Normocephalic. Right Ear: Hearing, ear canal and external ear normal. A middle ear effusion is present. Left Ear: Hearing, ear canal and external ear normal. A middle ear effusion is present. Ears:      Comments: Clear fluid behind right and left TM     Nose: Congestion present. Right Sinus: No maxillary sinus tenderness or frontal sinus tenderness. Left Sinus: No maxillary sinus tenderness or frontal sinus tenderness. Mouth/Throat:      Lips: Pink. Mouth: Mucous membranes are moist. No oral lesions. Tongue: No lesions. Pharynx: Oropharynx is clear. Uvula midline. Posterior oropharyngeal erythema present. Tonsils: 0 on the right. 0 on the left. Eyes:      General: Vision grossly intact. Conjunctiva/sclera: Conjunctivae normal.   Neck:      Musculoskeletal: Neck supple. Trachea: Phonation normal.   Cardiovascular:      Rate and Rhythm: Normal rate and regular rhythm. Heart sounds: Normal heart sounds, S1 normal and S2 normal. No murmur. No friction rub. No gallop. Pulmonary:      Effort: Pulmonary effort is normal. No respiratory distress. Breath sounds: Normal breath sounds and air entry. No wheezing, rhonchi or rales. Abdominal:      Palpations: Abdomen is soft. Musculoskeletal:         General: No tenderness or deformity. Lymphadenopathy:      Head:      Right side of head: No tonsillar adenopathy. Left side of head: No tonsillar adenopathy. Skin:     General: Skin is warm and dry. Capillary Refill: Capillary refill takes less than 2 seconds. Neurological:      General: No focal deficit present.       Mental Status: She is alert, oriented to person, place, and time and easily aroused. Mental status is at baseline. Psychiatric:         Attention and Perception: Attention normal.         Mood and Affect: Mood normal.         Speech: Speech normal.         Behavior: Behavior normal. Behavior is cooperative. /82   Pulse 71   Temp 97.8 °F (36.6 °C) (Temporal)   Resp 16   Wt 149 lb (67.6 kg)   SpO2 97%   BMI 26.39 kg/m²     :Assessment       Diagnosis Orders   1. Cough     2. Shortness of breath     3. Head congestion     4. Chills         :Plan    No orders of the defined types were placed in this encounter. Pt declined COVID test, strep test, chest x-ray tells me she thinks it is bronchitis and just wants to continue supportive care. We discussed her chronic illnesses and antibiotics and she declined this as well ifsymptoms worsen she is to return to clinic or call or see her PCP. She voiced understanding. Return if symptoms worsen or fail to improve. No orders of the defined types were placed in this encounter. Patient Instructions   Plenty of fluids  Rest  Flonase 2 sprays each nostril at bedtime  OTC Claritin or Zyrtec as directed  OTC Tylenol or Motrin as needed  If symptoms persist or worsen recommend returning to UC or follow-up with PCP- if you develop fever,worsening productive cough, poor oral intake, overall worsening of symptoms ( since you declined COVID testing today         Patient given educational materials- see patient instructions. Discussed use, benefit, and side effects of prescribedmedications. All patient questions answered. Pt voiced understanding.        Electronically signed by CAIO Millan CNP on 4/22/2021 at 2:33 PM

## 2021-04-22 NOTE — PATIENT INSTRUCTIONS
Plenty of fluids  Rest  Flonase 2 sprays each nostril at bedtime  OTC Claritin or Zyrtec as directed  OTC Tylenol or Motrin as needed  If symptoms persist or worsen recommend returning to UC or follow-up with PCP- if you develop fever,worsening productive cough, poor oral intake, overall worsening of symptoms ( since you declined COVID testing today

## 2021-04-22 NOTE — TELEPHONE ENCOUNTER
Reason for Disposition   Fever present > 3 days (72 hours)    Answer Assessment - Initial Assessment Questions  1. ONSET: \"When did the nasal discharge start? \"       Began to feel \"cold\" symptoms on Tuesday    2. AMOUNT: \"How much discharge is there? \"      Head congestion    3. COUGH: \"Do you have a cough? \" If yes, ask: \"Describe the color of your sputum\" (clear, white, yellow, green)     Confirms coughing- not constantly- no sputum coming up    4. RESPIRATORY DISTRESS: \"Describe your breathing. \"       SOB and \"chest discomfort\"- feels SOB when moving and a tightness in chest when breathing    5. FEVER: \"Do you have a fever? \" If so, ask: \"What is your temperature, how was it measured, and when did it start? \"     Reports chills- has history of Raynaud's disease- has had chills on and off all week. 6. SEVERITY: \"Overall, how bad are you feeling right now? \" (e.g., doesn't interfere with normal activities, staying home from school/work, staying in bed)       \"staying\" in bed, states, \"had a flare-up\" and now has \"sick\" feelings    7. OTHER SYMPTOMS: \"Do you have any other symptoms? \" (e.g., sore throat, earache, wheezing, vomiting)      Sore and swollen throat and tongue, decreased appetite, bilateral ear drainage, Has the chills     8. PREGNANCY: \"Is there any chance you are pregnant? \" \"When was your last menstrual period? \"    Denies    Protocols used: COMMON COLD-ADULT-OH    Received call from Janyth Aschoff at pre-service [facility] with [employer]. Brief description of triage: See above. Triage indicates for patient to be seen today. Patient requesting in person visit. Explained that due to current symptoms and [facility]) protocol would be unable to come into office. [facility] told this RN before triage that virtual visit was offered to patient today. Patient refusing virtual appointment. Will go to urgent care to be seen by provider.     Care advice provided, patient verbalizes understanding; denies any other questions or concerns; instructed to call back for any new or worsening symptoms. Attention Provider: Thank you for allowing me to participate in the care of your patient. The patient was connected to triage in response to information provided to the ECC. Please do not respond through this encounter as the response is not directed to a shared pool.

## 2021-05-03 ENCOUNTER — TELEPHONE (OUTPATIENT)
Dept: INTERNAL MEDICINE | Age: 41
End: 2021-05-03

## 2021-05-03 NOTE — TELEPHONE ENCOUNTER
[address] [facility] called to check status of medical records request. It was scanned in 3/8 and again on 4/14.

## 2021-05-04 ENCOUNTER — NURSE ONLY (OUTPATIENT)
Dept: INTERNAL MEDICINE | Age: 41
End: 2021-05-04
Payer: MEDICAID

## 2021-05-04 DIAGNOSIS — E53.8 B12 DEFICIENCY: Primary | ICD-10-CM

## 2021-05-04 PROCEDURE — 96372 THER/PROPH/DIAG INJ SC/IM: CPT | Performed by: INTERNAL MEDICINE

## 2021-05-04 RX ORDER — CYANOCOBALAMIN 1000 UG/ML
1000 INJECTION INTRAMUSCULAR; SUBCUTANEOUS ONCE
Status: COMPLETED | OUTPATIENT
Start: 2021-05-04 | End: 2021-05-04

## 2021-05-04 RX ADMIN — CYANOCOBALAMIN 1000 MCG: 1000 INJECTION INTRAMUSCULAR; SUBCUTANEOUS at 08:30

## 2021-05-04 NOTE — PROGRESS NOTES
1000mcg of B12 given in (R) delotid from office stock. Pt tolerated well. UlAditya Kohler 47 #[id number] LOT #[id number] EXP 4/22.

## 2021-05-06 ENCOUNTER — OFFICE VISIT (OUTPATIENT)
Dept: OTOLARYNGOLOGY | Facility: CLINIC | Age: 41
End: 2021-05-06

## 2021-05-06 VITALS — BODY MASS INDEX: 26.58 KG/M2 | OXYGEN SATURATION: 97 % | WEIGHT: 150 LBS | HEIGHT: 63 IN

## 2021-05-06 DIAGNOSIS — R09.89 CHRONIC THROAT CLEARING: Primary | ICD-10-CM

## 2021-05-06 DIAGNOSIS — J37.0 CHRONIC LARYNGITIS: ICD-10-CM

## 2021-05-06 PROCEDURE — 99213 OFFICE O/P EST LOW 20 MIN: CPT | Performed by: OTOLARYNGOLOGY

## 2021-05-10 NOTE — PROGRESS NOTES
Subjective   Leatha Goldman is a 40 y.o. female.       History of Present Illness   Patient I saw earlier this year with complaints about the appearance of her tongue.  This included scalloping of the lateral border which appeared to be mechanical related to the size of her tongue relative to her mandibular arch.  She also had chronic throat clearing and fiberoptic laryngoscopy showed what appeared to be chronic laryngitis.  She is a smoker.  She was given famotidine empirically and states this did not change things at all.  She continues to smoke and has no plans to quit.  I had also thought she was over traumatizing her tongue by brushing it 2-3 times a day with a toothbrush.  She says her tongue has not changed that she could tell.      The following portions of the patient's history were reviewed and updated as appropriate: allergies, current medications, past family history, past medical history, past social history, past surgical history and problem list.     reports that she has been smoking. She has been smoking about 1.00 pack per day. She has never used smokeless tobacco. She reports current alcohol use. She reports that she does not use drugs.   Patient is a tobacco user and has been counseled for use of tobacco products      Review of Systems        Objective   Physical Exam  Ears: External ears no deformity, canals no discharge, tympanic membranes intact clear and mobile bilaterally.  Nares: Mildly boggy mucosa no discharge or purulence  Oral cavity: Tongue remains grossly normal.  Mild scalloping of the lateral border without evidence of neoplasm or mucosal breakdown.  Ventral surface is whitish in appearance but this does not appear to be abnormal and certainly does not appear infectious.  Pharynx: No masses or lesions.  Mirror exam of the larynx shows erythema of the larynx consistent with cigarette smoking but no evidence of neoplasm.      Assessment/Plan   Diagnoses and all orders for this  visit:    1. Chronic throat clearing (Primary)    2. Chronic laryngitis      Plan: I had a claudia discussion with the patient.  I explained to her that I could not find any obvious infection or neoplasm.  I told her that her tongue appeared to be a collection of normal variants including the scalloping and the appearance of the ventral tongue.  Told her as long as she continues to smoke I am unlikely to be able to give her any medication that will improve her sensation of throat irritation and throat clearing.  I invited her to seek another opinion with one of my colleagues at [facility] if she was unsatisfied with my explanation.  I again encourage smoking cessation.  She may follow-up with me as needed.

## 2021-05-12 ENCOUNTER — HOSPITAL ENCOUNTER (OUTPATIENT)
Dept: GENERAL RADIOLOGY | Age: 41
Discharge: HOME | End: 2021-05-12
Payer: MEDICAID

## 2021-05-12 ENCOUNTER — OFFICE VISIT (OUTPATIENT)
Dept: INTERNAL MEDICINE | Age: 41
End: 2021-05-12
Payer: MEDICAID

## 2021-05-12 VITALS
HEIGHT: 63 IN | BODY MASS INDEX: 26.4 KG/M2 | HEART RATE: 76 BPM | RESPIRATION RATE: 18 BRPM | WEIGHT: 149 LBS | SYSTOLIC BLOOD PRESSURE: 120 MMHG | OXYGEN SATURATION: 98 % | DIASTOLIC BLOOD PRESSURE: 72 MMHG

## 2021-05-12 DIAGNOSIS — R05.9 COUGH: ICD-10-CM

## 2021-05-12 DIAGNOSIS — M79.7 FIBROMYALGIA: ICD-10-CM

## 2021-05-12 DIAGNOSIS — R53.83 OTHER FATIGUE: ICD-10-CM

## 2021-05-12 DIAGNOSIS — J20.9 ACUTE BRONCHITIS AND BRONCHIOLITIS: ICD-10-CM

## 2021-05-12 DIAGNOSIS — J21.9 ACUTE BRONCHITIS AND BRONCHIOLITIS: Primary | ICD-10-CM

## 2021-05-12 DIAGNOSIS — J34.89 SINUS PRESSURE: ICD-10-CM

## 2021-05-12 DIAGNOSIS — E06.3 HYPOTHYROIDISM DUE TO HASHIMOTO'S THYROIDITIS: ICD-10-CM

## 2021-05-12 DIAGNOSIS — J21.9 ACUTE BRONCHITIS AND BRONCHIOLITIS: ICD-10-CM

## 2021-05-12 DIAGNOSIS — F17.210 NICOTINE DEPENDENCE, CIGARETTES, UNCOMPLICATED: ICD-10-CM

## 2021-05-12 DIAGNOSIS — E03.8 HYPOTHYROIDISM DUE TO HASHIMOTO'S THYROIDITIS: ICD-10-CM

## 2021-05-12 DIAGNOSIS — J20.9 ACUTE BRONCHITIS AND BRONCHIOLITIS: Primary | ICD-10-CM

## 2021-05-12 LAB
ALBUMIN SERPL-MCNC: 4.7 G/DL (ref 3.5–5.2)
ALP BLD-CCNC: 85 U/L (ref 35–104)
ALT SERPL-CCNC: 16 U/L (ref 5–33)
ANION GAP SERPL CALCULATED.3IONS-SCNC: 14 MMOL/L (ref 7–19)
AST SERPL-CCNC: 16 U/L (ref 5–32)
BASOPHILS ABSOLUTE: 0 K/UL (ref 0–0.2)
BASOPHILS RELATIVE PERCENT: 0.5 % (ref 0–1)
BILIRUB SERPL-MCNC: 0.6 MG/DL (ref 0.2–1.2)
BUN BLDV-MCNC: 7 MG/DL (ref 6–20)
CALCIUM SERPL-MCNC: 9.7 MG/DL (ref 8.6–10)
CHLORIDE BLD-SCNC: 103 MMOL/L (ref 98–111)
CO2: 23 MMOL/L (ref 22–29)
CREAT SERPL-MCNC: 0.6 MG/DL (ref 0.5–0.9)
EOSINOPHILS ABSOLUTE: 0.5 K/UL (ref 0–0.6)
EOSINOPHILS RELATIVE PERCENT: 6.2 % (ref 0–5)
GFR AFRICAN AMERICAN: >59
GFR NON-AFRICAN AMERICAN: >60
GLUCOSE BLD-MCNC: 96 MG/DL (ref 74–109)
HCT VFR BLD CALC: 44.9 % (ref 37–47)
HEMOGLOBIN: 15.2 G/DL (ref 12–16)
IMMATURE GRANULOCYTES #: 0 K/UL
LYMPHOCYTES ABSOLUTE: 2.2 K/UL (ref 1.1–4.5)
LYMPHOCYTES RELATIVE PERCENT: 25.8 % (ref 20–40)
MCH RBC QN AUTO: 32.7 PG (ref 27–31)
MCHC RBC AUTO-ENTMCNC: 33.9 G/DL (ref 33–37)
MCV RBC AUTO: 96.6 FL (ref 81–99)
MONOCYTES ABSOLUTE: 0.6 K/UL (ref 0–0.9)
MONOCYTES RELATIVE PERCENT: 7 % (ref 0–10)
NEUTROPHILS ABSOLUTE: 5.1 K/UL (ref 1.5–7.5)
NEUTROPHILS RELATIVE PERCENT: 60 % (ref 50–65)
PDW BLD-RTO: 13.2 % (ref 11.5–14.5)
PLATELET # BLD: 372 K/UL (ref 130–400)
PMV BLD AUTO: 9.8 FL (ref 9.4–12.3)
POTASSIUM SERPL-SCNC: 4 MMOL/L (ref 3.5–5)
RBC # BLD: 4.65 M/UL (ref 4.2–5.4)
SODIUM BLD-SCNC: 140 MMOL/L (ref 136–145)
TOTAL PROTEIN: 7.6 G/DL (ref 6.6–8.7)
TSH SERPL DL<=0.05 MIU/L-ACNC: 1.33 UIU/ML (ref 0.27–4.2)
WBC # BLD: 8.6 K/UL (ref 4.8–10.8)

## 2021-05-12 PROCEDURE — 71046 X-RAY EXAM CHEST 2 VIEWS: CPT

## 2021-05-12 PROCEDURE — 4004F PT TOBACCO SCREEN RCVD TLK: CPT | Performed by: INTERNAL MEDICINE

## 2021-05-12 PROCEDURE — G8427 DOCREV CUR MEDS BY ELIG CLIN: HCPCS | Performed by: INTERNAL MEDICINE

## 2021-05-12 PROCEDURE — G8419 CALC BMI OUT NRM PARAM NOF/U: HCPCS | Performed by: INTERNAL MEDICINE

## 2021-05-12 PROCEDURE — 99214 OFFICE O/P EST MOD 30 MIN: CPT | Performed by: INTERNAL MEDICINE

## 2021-05-12 RX ORDER — ARIPIPRAZOLE 5 MG/1
5 TABLET ORAL NIGHTLY
COMMUNITY
End: 2021-08-20

## 2021-05-12 RX ORDER — LEVOCETIRIZINE DIHYDROCHLORIDE 5 MG/1
5 TABLET, FILM COATED ORAL NIGHTLY
Qty: 30 TABLET | Refills: 0 | Status: SHIPPED | OUTPATIENT
Start: 2021-05-12 | End: 2021-06-04

## 2021-05-12 RX ORDER — RIZATRIPTAN BENZOATE 10 MG/1
10 TABLET ORAL
COMMUNITY
End: 2021-11-19 | Stop reason: SDUPTHER

## 2021-05-12 RX ORDER — ALBUTEROL SULFATE 90 UG/1
2 AEROSOL, METERED RESPIRATORY (INHALATION) 4 TIMES DAILY PRN
Qty: 1 INHALER | Refills: 0 | Status: SHIPPED | OUTPATIENT
Start: 2021-05-12 | End: 2021-06-01

## 2021-05-12 RX ORDER — CYCLOBENZAPRINE HCL 5 MG
5 TABLET ORAL
COMMUNITY
End: 2022-06-06 | Stop reason: ALTCHOICE

## 2021-05-12 RX ORDER — PREDNISONE 10 MG/1
10 TABLET ORAL 2 TIMES DAILY
Qty: 6 TABLET | Refills: 0 | Status: SHIPPED | OUTPATIENT
Start: 2021-05-12 | End: 2021-05-15

## 2021-05-12 RX ORDER — AZITHROMYCIN 250 MG/1
250 TABLET, FILM COATED ORAL SEE ADMIN INSTRUCTIONS
Qty: 6 TABLET | Refills: 0 | Status: SHIPPED | OUTPATIENT
Start: 2021-05-12 | End: 2021-05-17

## 2021-05-12 ASSESSMENT — ENCOUNTER SYMPTOMS
COUGH: 1
CHEST TIGHTNESS: 0
ABDOMINAL PAIN: 0
WHEEZING: 1
CONSTIPATION: 0
SORE THROAT: 0

## 2021-05-12 NOTE — PROGRESS NOTES
Chief Complaint   Patient presents with    Fatigue     Pt has been getting really tired after eating meals for the past 3 weeks. PT is not able to get into her Endo at [facility] in order to discuss this    Cough     On-Going for about 3 weeks. Went to urgent care on 04/22 and states that she is still coughing no XR was done    Migraine     History of presenting illness:  Ivon Hutson is a35 y.o. female who presents today for follow up on her chronic medical conditions as noted below. 1. Coughing 3 weeks  Went to urgent care- states nothing was done  Some sneezing  Some yellow colored sputum  Has ho pneumonia in past    2. Has low thyroid form Hashimotos's  ++ thryoid nodules  Follows with endocrinology at [place] [address] that TFT's were good with endo 1/2021  States that recently over last 3 to 4 weeks has been feeling extremely tired  She wonders if her thyroid is in good range    3. She also has issues with fibromyalgia.   Feels like her fibromyalgia may be \"acting up\"    Patient Active Problem List    Diagnosis Date Noted    Nicotine dependence, cigarettes, uncomplicated 24/84/5457    Pain, neck 02/18/2020    Photophobia 02/18/2020    Fibromyalgia 02/18/2020    Migraine without status migrainosus, not intractable 12/20/2019    Hypothyroidism due to Hashimoto's thyroiditis 09/19/2019    Thyroid nodule 09/11/2019    Labial hypertrophy 02/16/2017    Vaginal inclusion cyst 02/16/2017    Vulvar skin tag 02/16/2017    Ovarian cyst 02/19/2013    Dyspareunia, female 02/19/2013     Past Medical History:   Diagnosis Date    Fibromyalgia     Fluid retention     H/O Raynaud's syndrome     Heartburn     Hypothyroidism due to Hashimoto's thyroiditis 9/19/2019    Labial cyst     sebaceous cyst and skin tag    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    Spastic colon     Vitamin D deficiency       Past Surgical History:   Procedure Laterality Date    APPENDECTOMY      CHOLECYSTECTOMY      COLONOSCOPY  2010    OVARY REMOVAL      left    RI LYSIS OF LABIAL ADHESIONS N/A 2/16/2017    INCISION AND DRAINAGE OF SEBACEOUS CYST ON LABIA; SKIN TAG REMOVAL OF PERINEUM  performed by Jaun Bowden MD at  [facility]      TYMPANOSTOMY TUBE PLACEMENT      UPPER GASTROINTESTINAL ENDOSCOPY      VULVA SURGERY N/A 2/16/2017    LABIALPLASTY  performed by Jaun Bowden MD at [facility] OR     Current Outpatient Medications   Medication Sig Dispense Refill    rizatriptan (MAXALT) 10 MG tablet Take 10 mg by mouth once as needed for Migraine May repeat in 2 hours if needed      cyclobenzaprine (FLEXERIL) 5 MG tablet Take 5 mg by mouth 2 times daily as needed for Muscle spasms      ARIPiprazole (ABILIFY) 5 MG tablet Take 5 mg by mouth nightly      levocetirizine (XYZAL) 5 MG tablet Take 1 tablet by mouth nightly 30 tablet 0    azithromycin (ZITHROMAX) 250 MG tablet Take 1 tablet by mouth See Admin Instructions for 5 days 500mg on day 1 followed by 250mg on days 2 - 5 6 tablet 0    predniSONE (DELTASONE) 10 MG tablet Take 1 tablet by mouth 2 times daily for 3 days 6 tablet 0    albuterol sulfate HFA (VENTOLIN HFA) 108 (90 Base) MCG/ACT inhaler Inhale 2 puffs into the lungs 4 times daily as needed for Wheezing 1 Inhaler 0    busPIRone (BUSPAR) 10 MG tablet 10 mg 2 times daily       vitamin D (ERGOCALCIFEROL) 1.25 MG (82454 UT) CAPS capsule TAKE 1 CAPSULE BY MOUTH ONE TIME PER WEEK 4 capsule 5    famotidine (PEPCID) 20 MG tablet Take 1 tablet by mouth 2 times daily as needed (reflux) 180 tablet 3    cyanocobalamin 1000 MCG/ML injection Inject 1 mL into the muscle every 30 days 1 mL 0    folic acid (FOLVITE) 1 MG tablet Take 1 tablet by mouth daily 90 tablet 1    levothyroxine (SYNTHROID) 75 MCG tablet Take 1 tablet by mouth Daily 30 tablet 8    dicyclomine (BENTYL) 20 MG tablet TAKE 1 TABLET BY MOUTH 2 TIMES DAILY AS NEEDED (ABDOMINAL PAIN) 60 tablet 5    azelastine HCl 0.15 % SOLN USE 2 SPRAYS TWICE A DAY AS NEEDED 30 mL 3    DULoxetine (CYMBALTA) 60 MG extended release capsule Take 60 mg by mouth daily      furosemide (LASIX) 20 MG tablet Take 20 mg by mouth daily        No current facility-administered medications for this visit. Allergies   Allergen Reactions    Armodafinil Rash     Thrush and mouth ulcers  Thrush and mouth ulcers     Social History     Tobacco Use    Smoking status: Current Every Day Smoker     Packs/day: 0.50     Years: 10.00     Pack years: 5.00     Types: Cigarettes    Smokeless tobacco: Never Used   Substance Use Topics    Alcohol use: Yes     Alcohol/week: 1.0 standard drinks     Types: 1 Shots of liquor per week     Comment: 2 per month      Family History   Problem Relation Age of Onset    Breast Cancer Mother 61    Cancer Maternal Aunt         breast unsure of age   [employer] Cancer Paternal [de-identified]         breast unsure of age   [employer] Stroke Maternal Grandmother     Stroke Maternal Grandfather     Cancer Paternal Aunt         breast unsure of age       Review of Systems   Constitutional: Positive for fatigue. Negative for chills and fever. HENT: Negative for congestion, ear pain, nosebleeds, postnasal drip and sore throat. Respiratory: Positive for cough and wheezing. Negative for chest tightness. Cardiovascular: Negative for chest pain, palpitations and leg swelling. Gastrointestinal: Negative for abdominal pain and constipation. Genitourinary: Negative for dysuria and urgency. Musculoskeletal: Positive for arthralgias and myalgias. Skin: Negative for rash. Neurological: Negative for dizziness and headaches. Psychiatric/Behavioral: Negative. Vitals:    05/12/21 1303   BP: 120/72   Site: Left Upper Arm   Position: Sitting   Cuff Size: Large Adult   Pulse: 76   Resp: 18   SpO2: 98%   Weight: 149 lb (67.6 kg)   Height: 5' 3\" (1.6 m)     Body mass index is 26.39 kg/m². Physical Exam  Constitutional:       Appearance: She is well-developed.    HENT: Right Ear: External ear normal.      Left Ear: External ear normal.      Nose: Congestion present. Mouth/Throat:      Pharynx: No oropharyngeal exudate. Eyes:      Conjunctiva/sclera: Conjunctivae normal.      Pupils: Pupils are equal, round, and reactive to light. Neck:      Musculoskeletal: Neck supple. Thyroid: No thyromegaly. Vascular: No JVD. Cardiovascular:      Rate and Rhythm: Normal rate. Heart sounds: Normal heart sounds. No murmur. Pulmonary:      Effort: No respiratory distress. Breath sounds: Wheezing and rales present. Chest:      Chest wall: No tenderness. Abdominal:      General: Bowel sounds are normal.      Palpations: Abdomen is soft. Lymphadenopathy:      Cervical: No cervical adenopathy. Skin:     Findings: No rash. Lab Review   No visits with results within 2 Month(s) from this visit.    Latest known visit with results is:   Orders Only on 02/17/2021   Component Date Value    Vit D, 25-Hydroxy 02/17/2021 28.6*    Cholesterol, Total 02/17/2021 176     Triglycerides 02/17/2021 105     HDL 02/17/2021 48*    LDL Calculated 02/17/2021 107     TSH 02/17/2021 2.180     Sodium 02/17/2021 138     Potassium 02/17/2021 4.1     Chloride 02/17/2021 103     CO2 02/17/2021 25     Anion Gap 02/17/2021 10     Glucose 02/17/2021 85     BUN 02/17/2021 10     CREATININE 02/17/2021 0.8     GFR Non- 02/17/2021 >60     GFR  02/17/2021 >59     Calcium 02/17/2021 9.1     Total Protein 02/17/2021 7.0     Albumin 02/17/2021 4.4     Total Bilirubin 02/17/2021 0.6     Alkaline Phosphatase 02/17/2021 65     ALT 02/17/2021 8     AST 02/17/2021 11     WBC 02/17/2021 9.3     RBC 02/17/2021 4.44     Hemoglobin 02/17/2021 14.5     Hematocrit 02/17/2021 44.3     MCV 02/17/2021 99.8*    MCH 02/17/2021 32.7*    MCHC 02/17/2021 32.7*    RDW 02/17/2021 13.0     Platelets 36/66/4577 292     MPV 02/17/2021 10.3     Neutrophils % 02/17/2021 61.2     Lymphocytes % 02/17/2021 26.3     Monocytes % 02/17/2021 6.4     Eosinophils % 02/17/2021 5.3*    Basophils % 02/17/2021 0.5     Neutrophils Absolute 02/17/2021 5.7     Immature Granulocytes # 02/17/2021 0.0     Lymphocytes Absolute 02/17/2021 2.4     Monocytes Absolute 02/17/2021 0.60     Eosinophils Absolute 02/17/2021 0.50     Basophils Absolute 02/17/2021 0.10            ASSESSMENT/PLAN:    Acute bronchitis and bronchiolitis  Cough  Sinus pressure  Coughing 3 weeks  Went to urgent care- states nothing was done  Some sneezing  Some yellow colored sputum  Has ho pneumonia in past    Obtain    -     XR CHEST (2 VW); Future    RX  -     azithromycin (ZITHROMAX) 250 MG tablet; Take 1 tablet by mouth See Admin Instructions for 5 days 500mg on day 1 followed by 250mg on days 2 - 5  -     levocetirizine (XYZAL) 5 MG tablet; Take 1 tablet by mouth nightly  -     predniSONE (DELTASONE) 10 MG tablet; Take 1 tablet by mouth 2 times daily for 3 days  -     albuterol sulfate HFA (VENTOLIN HFA) 108 (90 Base) MCG/ACT inhaler; Inhale 2 puffs into the lungs 4 times daily as needed for Wheezing  If sx not improving and resolving , if sx continue or re-occur pt has been instructed to call us and / or return here for follow- up evaluation      Other fatigue  Known history of fibromyalgia  Hypothyroidism due to Hashimoto's thyroiditis  Has low thyroid form Hashimotos's  ++ thryoid nodules  Follows with endocrinology at [place] [address] that TFT's were good with endo 1/2021  States that recently over last 3 to 4 weeks has been feeling extremely tired  She thinks her thyroid levels are not well  Obtain    -     CBC Auto Differential; Future  -     Comprehensive Metabolic Panel; Future  -     TSH without Reflex; Future      Nicotine dependence, cigarettes, uncomplicated  Smoking cessation discussed with patient. Reference material given.   Additional counseling and prescription medication help was offered to patient. Orders Placed This Encounter   Procedures    XR CHEST (2 VW)    CBC Auto Differential    Comprehensive Metabolic Panel    TSH without Reflex     New Prescriptions    ALBUTEROL SULFATE HFA (VENTOLIN HFA) 108 (90 BASE) MCG/ACT INHALER    Inhale 2 puffs into the lungs 4 times daily as needed for Wheezing    AZITHROMYCIN (ZITHROMAX) 250 MG TABLET    Take 1 tablet by mouth See Admin Instructions for 5 days 500mg on day 1 followed by 250mg on days 2 - 5    LEVOCETIRIZINE (XYZAL) 5 MG TABLET    Take 1 tablet by mouth nightly    PREDNISONE (DELTASONE) 10 MG TABLET    Take 1 tablet by mouth 2 times daily for 3 days         No follow-ups on file. Patient Instructions     Patient Education        Stopping Smoking: Care Instructions  Your Care Instructions     Cigarette smokers crave the nicotine in cigarettes. Giving it up is much harder than simply changing a habit. Your body has to stop craving the nicotine. It is hard to quit, but you can do it. There are many tools that people use to quit smoking. You may find that combining tools works best for you. There are several steps to quitting. First you get ready to quit. Then you get support to help you. After that, you learn new skills and behaviors to become a nonsmoker. For many people, a necessary step is getting and using medicine. Your doctor will help you set up the plan that best meets your needs. You may want to attend a smoking cessation program to help you quit smoking. When you choose a program, look for one that has proven success. Ask your doctor for ideas. You will greatly increase your chances of success if you take medicine as well as get counseling or join a cessation program.  Some of the changes you feel when you first quit tobacco are uncomfortable. Your body will miss the nicotine at first, and you may feel short-tempered and grumpy. You may have trouble sleeping or concentrating.  Medicine can help you deal with these symptoms. You may struggle with changing your smoking habits and rituals. The last step is the tricky one: Be prepared for the smoking urge to continue for a time. This is a lot to deal with, but keep at it. You will feel better. Follow-up care is a key part of your treatment and safety. Be sure to make and go to all appointments, and call your doctor if you are having problems. It's also a good idea to know your test results and keep a list of the medicines you take. How can you care for yourself at home? · Ask your family, friends, and coworkers for support. You have a better chance of quitting if you have help and support. · Join a support group, such as Nicotine Anonymous, for people who are trying to quit smoking. · Consider signing up for a smoking cessation program, such as the American Lung Association's Freedom from Smoking program.  · Get text messaging support. Go to the website at www.smokefree. gov to sign up for the [facility] program.  · Set a quit date. Pick your date carefully so that it is not right in the middle of a big deadline or stressful time. Once you quit, do not even take a puff. Get rid of all ashtrays and lighters after your last cigarette. Clean your house and your clothes so that they do not smell of smoke. · Learn how to be a nonsmoker. Think about ways you can avoid those things that make you reach for a cigarette. ? Avoid situations that put you at greatest risk for smoking. For some people, it is hard to have a drink with friends without smoking. For others, they might skip a coffee break with coworkers who smoke. ? Change your daily routine. Take a different route to work or eat a meal in a different place. · Cut down on stress. Calm yourself or release tension by doing an activity you enjoy, such as reading a book, taking a hot bath, or gardening.   · Talk to your doctor or pharmacist about nicotine replacement therapy, which replaces the nicotine in your body. You still get nicotine but you do not use tobacco. Nicotine replacement products help you slowly reduce the amount of nicotine you need. These products come in several forms, many of them available over-the-counter:  ? Nicotine patches  ? Nicotine gum and lozenges  ? Nicotine inhaler  · Ask your doctor about bupropion (Wellbutrin) or varenicline (Chantix), which are prescription medicines. They do not contain nicotine. They help you by reducing withdrawal symptoms, such as stress and anxiety. · Some people find hypnosis, acupuncture, and massage helpful for ending the smoking habit. · Eat a healthy diet and get regular exercise. Having healthy habits will help your body move past its craving for nicotine. · Be prepared to keep trying. Most people are not successful the first few times they try to quit. Do not get mad at yourself if you smoke again. Make a list of things you learned and think about when you want to try again, such as next week, next month, or next year. Where can you learn more? Go to https://[employer] org and sign in to your [employer] account. Enter L193 in the [employer] box to learn more about \"Stopping Smoking: Care Instructions. \"     If you do not have an account, please click on the \"Sign Up Now\" link. Current as of: March 12, 2020               Content Version: 12.8  © 6957-6663 Healthwise, Incorporated. Care instructions adapted under license by [facility] ([facility]). If you have questions about a medical condition or this instruction, always ask your healthcare professional. [place] [address] any warranty or liability for your use of this information. EMR Dragon/transcription disclaimer:Significant part of this  encounter note is electronic transcription/translationof spoken language to printed text.  The electronic translation of spoken language may be erroneous, or at times, nonsensical words or phrases may be inadvertently transcribed.  Although I have reviewed the note for sucherrors, some may still exist. Cheiloplasty (Complex) Text: A decision was made to reconstruct the defect with a  cheiloplasty.  The defect was undermined extensively.  Additional obicularis oris muscle was excised with a 15 blade scalpel.  The defect was converted into a full thickness wedge to facilite a better cosmetic result.  Small vessels were then tied off with 5-0 monocyrl. The obicularis oris, superficial fascia, adipose and dermis were then reapproximated.  After the deeper layers were approximated the epidermis was reapproximated with particular care given to realign the vermilion border.

## 2021-05-12 NOTE — PATIENT INSTRUCTIONS
are trying to quit smoking. · Consider signing up for a smoking cessation program, such as the American Lung Association's Freedom from Smoking program.  · Get text messaging support. Go to the website at www.smokefree. gov to sign up for the [facility] program.  · Set a quit date. Pick your date carefully so that it is not right in the middle of a big deadline or stressful time. Once you quit, do not even take a puff. Get rid of all ashtrays and lighters after your last cigarette. Clean your house and your clothes so that they do not smell of smoke. · Learn how to be a nonsmoker. Think about ways you can avoid those things that make you reach for a cigarette. ? Avoid situations that put you at greatest risk for smoking. For some people, it is hard to have a drink with friends without smoking. For others, they might skip a coffee break with coworkers who smoke. ? Change your daily routine. Take a different route to work or eat a meal in a different place. · Cut down on stress. Calm yourself or release tension by doing an activity you enjoy, such as reading a book, taking a hot bath, or gardening. · Talk to your doctor or pharmacist about nicotine replacement therapy, which replaces the nicotine in your body. You still get nicotine but you do not use tobacco. Nicotine replacement products help you slowly reduce the amount of nicotine you need. These products come in several forms, many of them available over-the-counter:  ? Nicotine patches  ? Nicotine gum and lozenges  ? Nicotine inhaler  · Ask your doctor about bupropion (Wellbutrin) or varenicline (Chantix), which are prescription medicines. They do not contain nicotine. They help you by reducing withdrawal symptoms, such as stress and anxiety. · Some people find hypnosis, acupuncture, and massage helpful for ending the smoking habit. · Eat a healthy diet and get regular exercise.  Having healthy habits will help your body move past its craving for

## 2021-05-24 ENCOUNTER — TELEPHONE (OUTPATIENT)
Dept: INTERNAL MEDICINE | Age: 41
End: 2021-05-24

## 2021-06-01 ENCOUNTER — NURSE ONLY (OUTPATIENT)
Dept: INTERNAL MEDICINE | Age: 41
End: 2021-06-01
Payer: MEDICAID

## 2021-06-01 DIAGNOSIS — E53.8 B12 DEFICIENCY: Primary | ICD-10-CM

## 2021-06-01 PROCEDURE — 96372 THER/PROPH/DIAG INJ SC/IM: CPT | Performed by: INTERNAL MEDICINE

## 2021-06-01 RX ORDER — CYANOCOBALAMIN 1000 UG/ML
1000 INJECTION INTRAMUSCULAR; SUBCUTANEOUS ONCE
Status: COMPLETED | OUTPATIENT
Start: 2021-06-01 | End: 2021-06-01

## 2021-06-01 RX ADMIN — CYANOCOBALAMIN 1000 MCG: 1000 INJECTION INTRAMUSCULAR; SUBCUTANEOUS at 08:59

## 2021-06-01 NOTE — TELEPHONE ENCOUNTER
Last Appointment Date: 5/12/2021  Next Appointment Date: 8/23/2021    Allergies   Allergen Reactions    Armodafinil Rash     Thrush and mouth ulcers  Thrush and mouth ulcers       Patient needs refill on   Requested Prescriptions     Pending Prescriptions Disp Refills    VENTOLIN  (90 Base) MCG/ACT inhaler [Pharmacy Med Name: VENTOLIN HFA 90 MCG INHALER] 18 Inhaler 0     Sig: INHALE 2 PUFFS INTO THE LUNGS 4 TIMES DAILY AS NEEDED FOR WHEEZING

## 2021-06-04 RX ORDER — LEVOCETIRIZINE DIHYDROCHLORIDE 5 MG/1
TABLET, FILM COATED ORAL
Qty: 30 TABLET | Refills: 0 | Status: SHIPPED | OUTPATIENT
Start: 2021-06-04

## 2021-07-06 RX ORDER — FOLIC ACID 1 MG/1
TABLET ORAL
Qty: 90 TABLET | Refills: 1 | Status: SHIPPED | OUTPATIENT
Start: 2021-07-06 | End: 2021-08-20

## 2021-07-06 NOTE — TELEPHONE ENCOUNTER
Daija called requesting a refill of the below medication which has been pended for you:     Requested Prescriptions     Pending Prescriptions Disp Refills    folic acid (FOLVITE) 1 MG tablet [Pharmacy Med Name: FOLIC ACID 1 MG TABLET] 90 tablet 1     Sig: TAKE 1 TABLET BY MOUTH EVERY DAY       Last Appointment Date: 2/22/2021  Next Appointment Date: 8/23/2021    Allergies   Allergen Reactions    Armodafinil Rash     Thrush and mouth ulcers  Thrush and mouth ulcers
Simple: Patient demonstrates quick and easy understanding

## 2021-07-12 ENCOUNTER — HOSPITAL ENCOUNTER (OUTPATIENT)
Dept: PAIN MANAGEMENT | Age: 41
Discharge: HOME | End: 2021-07-12
Payer: MEDICAID

## 2021-07-12 VITALS
HEART RATE: 75 BPM | SYSTOLIC BLOOD PRESSURE: 117 MMHG | TEMPERATURE: 96.8 F | OXYGEN SATURATION: 98 % | DIASTOLIC BLOOD PRESSURE: 65 MMHG | RESPIRATION RATE: 18 BRPM

## 2021-07-12 PROCEDURE — 64615 CHEMODENERV MUSC MIGRAINE: CPT

## 2021-07-12 PROCEDURE — 6360000002 HC RX W HCPCS

## 2021-07-12 PROCEDURE — 64615 CHEMODENERV MUSC MIGRAINE: CPT | Performed by: PSYCHIATRY & NEUROLOGY

## 2021-07-12 NOTE — PROCEDURES
[address]    Patient Name: Rina Vasquez    : 1980                    Age: 38 y.o. Sex: female    Date: 2021    Pre-op Diagnosis: Chronic Migraines    Post-op Diagnosis: Chronic Migraines    Procedure: Botox injections x 155 units (45 units wasted) for migraine    Performing Procedure:  Dr Álvaro Montero for the past 24 hrs:   BP Temp Temp src Pulse Resp SpO2   21 0939 117/65 96.8 °F (36 °C) Temporal 75 18 98 %       Previous Injections:  Patient had 4 migraines and 2 headaches over the past month. At least 100 units or greater of Botox was used. The patient had reduction in migraines and was able to increase their activity level post treatment with Botox    Indications:    Rina Vasquez has been treated for problems with chronic migraine headaches. The patient was taken through a conservative course of treatment without complete resolution of symptoms. Botox injection therapy was offered and after the risks and benefits of the procedure were explained to the patient, we had agreed to proceed. The patient was taken to the procedure room and placed in the seated position. The following muscles were identified using palpation and standard landmarks. These muscles were marked and prepped with alcohol. A total of 155 units were injected after careful aspiration and the following distribution bilaterally:  10 units in 2 sites, procerus 5 units in one site, frontalis 20 units in 4 sites, temporalis 40 unit in 8 sites, occipitals 30 units in 6 sites, cervical paraspinals 20 units in 4 sites, and trapezius 30 units in 6 sites. Discharge: The patient tolerated the procedure well. There were no complications during the procedure and the patient was discharged home with discharge instructions.     The patient has been instructed to contact the office should there be any complications or questions to arise between today and their next appointment.     Plan:  [] Will return to the office in   [] 1 month  [] 4 - 6 weeks  [] 8 weeks   [x] 12 weeks:  [x] Procedure Follow-up   [] Office Visit      Madi Lux MD, 7/12/2021 at 10:05 AM

## 2021-07-12 NOTE — PROGRESS NOTES
Patient states that he/she has _2_____ headaches out of 30 days each month.   Patient states that he/she has __4____ migraines out of 30 days each month.monthly

## 2021-07-13 ENCOUNTER — NURSE ONLY (OUTPATIENT)
Dept: INTERNAL MEDICINE | Age: 41
End: 2021-07-13
Payer: MEDICAID

## 2021-07-13 DIAGNOSIS — E53.8 B12 DEFICIENCY: Primary | ICD-10-CM

## 2021-07-13 PROCEDURE — 96372 THER/PROPH/DIAG INJ SC/IM: CPT | Performed by: INTERNAL MEDICINE

## 2021-07-13 RX ORDER — CYANOCOBALAMIN 1000 UG/ML
1000 INJECTION INTRAMUSCULAR; SUBCUTANEOUS ONCE
Status: COMPLETED | OUTPATIENT
Start: 2021-07-13 | End: 2021-07-13

## 2021-07-13 RX ADMIN — CYANOCOBALAMIN 1000 MCG: 1000 INJECTION INTRAMUSCULAR; SUBCUTANEOUS at 10:33

## 2021-07-13 NOTE — PROGRESS NOTES
1000mcg of B12 given in (L) deltoid from office stock. Pt tolerated well. [facility] #[id number] LOT #[id number] EXP 11/2021.

## 2021-07-20 ENCOUNTER — TELEPHONE (OUTPATIENT)
Dept: INTERNAL MEDICINE | Age: 41
End: 2021-07-20
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
## 2021-07-20 NOTE — TELEPHONE ENCOUNTER
Royer with [facility]  at [facility] calling back to see what time the deposition on 8/4 you could do. He said if that 8/4 doesn't work they could do 8/11, but they want to get it done ASAP. Please advise.

## 2021-07-26 ENCOUNTER — TELEPHONE (OUTPATIENT)
Dept: INTERNAL MEDICINE | Age: 41
End: 2021-07-26

## 2021-07-26 NOTE — TELEPHONE ENCOUNTER
Shantelle German, from [facility] is calling stating that they need to schedule a phone conference to go over Pts care. Please advise a time that this can be set up.

## 2021-08-03 ENCOUNTER — PATIENT MESSAGE (OUTPATIENT)
Dept: INTERNAL MEDICINE | Age: 41
End: 2021-08-03

## 2021-08-03 DIAGNOSIS — M79.7 FIBROMYALGIA: Primary | ICD-10-CM

## 2021-08-03 NOTE — TELEPHONE ENCOUNTER
Okay to refer. Is she willing to go to [facility]?  The rheumatologists here don't always take her insurance

## 2021-08-03 NOTE — TELEPHONE ENCOUNTER
From: Cait Donis  To: Raffaele Adams MD  Sent: 8/3/2021 3:38 PM CDT  Subject: Test Results Question    Dr. Juanito Lopez,    I am leaving [facility]. I saw my Endocrinologist, Dr. Vince Jaimes. I need to be referred to a rheumatologist as soon as possible. Dr. Vince Jaimes stated it would be easier if Dr. Juanito Lopez referred me versus her. I had them send my visit to your office as well. I had lab work today and an ultrasound.    Thank you,    Cait Donis  [birth year]

## 2021-08-17 ENCOUNTER — NURSE ONLY (OUTPATIENT)
Dept: INTERNAL MEDICINE | Age: 41
End: 2021-08-17
Payer: MEDICAID

## 2021-08-17 ENCOUNTER — PATIENT MESSAGE (OUTPATIENT)
Dept: INTERNAL MEDICINE | Age: 41
End: 2021-08-17

## 2021-08-17 DIAGNOSIS — E53.8 B12 DEFICIENCY: Primary | ICD-10-CM

## 2021-08-17 PROCEDURE — 96372 THER/PROPH/DIAG INJ SC/IM: CPT | Performed by: INTERNAL MEDICINE

## 2021-08-17 RX ORDER — LEVOTHYROXINE SODIUM 0.1 MG/1
75 TABLET ORAL DAILY
Qty: 90 TABLET | Refills: 1 | Status: SHIPPED | OUTPATIENT
Start: 2021-08-17 | End: 2021-08-23 | Stop reason: DRUGHIGH

## 2021-08-17 RX ORDER — CYANOCOBALAMIN 1000 UG/ML
1000 INJECTION INTRAMUSCULAR; SUBCUTANEOUS ONCE
Status: COMPLETED | OUTPATIENT
Start: 2021-08-17 | End: 2021-08-17

## 2021-08-17 RX ADMIN — CYANOCOBALAMIN 1000 MCG: 1000 INJECTION INTRAMUSCULAR; SUBCUTANEOUS at 08:09

## 2021-08-17 NOTE — TELEPHONE ENCOUNTER
Well, we were not aware that her Synthroid had been changed by somebody else. She can continue her Synthroid to 88 mcg p.o. daily. We will make sure we change this when she comes in on Monday. We will repeat some additional labs with her CBC. We will check a B12, iron, ferritin, TIBC when she comes in. Her elevated TSH can certainly cause constipation. It may take a little bit of time for the medication to work.

## 2021-08-17 NOTE — TELEPHONE ENCOUNTER
Called patient she reports not feeling good overall for 2 weeks. She has been so fatigued she doesn't feel like doing anything. She reports sleeping much more than normal, she takes her son to school and goes home to sleep until around 2-3PM will be up a few hours and does it all over again. She reports not having a bowel movement since last Monday. She reports she has IBS so she has the back and forth of constipation and diarrhea. She reports she has tried enemas, Milk of mag, stool softners and has still been unable to go. Patient denies any fever, sore throat, cough or flu like symptoms. She doesn't know what is going on. She has been on 88mcg of levothyroxine at the beginning of august but hasnt seen any improvement. She had labs drawn this morning she does have an upcoming appointment Monday.

## 2021-08-17 NOTE — PROGRESS NOTES
1000mcg of B12 given in (L) deltoid from office stock. Pt tolerated well. [facility] #[phone] LOT #[id number] EXP 8/2022.

## 2021-08-17 NOTE — TELEPHONE ENCOUNTER
Daija called requesting a refill of the below medication which has been pended for you:     Requested Prescriptions     Pending Prescriptions Disp Refills    levothyroxine (SYNTHROID) 100 MCG tablet 90 tablet 1     Sig: Take 1 tablet by mouth Daily       Last Appointment Date: 2/22/2021  Next Appointment Date: 8/23/2021    Allergies   Allergen Reactions    Armodafinil Rash     Thrush and mouth ulcers  Thrush and mouth ulcers

## 2021-08-17 NOTE — TELEPHONE ENCOUNTER
From: Carlyon Denver  To: Marilu Rodrigez MD  Sent: 8/17/2021 2:09 PM CDT  Subject: Test Results Question    Dr. Maria G Camacho,    Please help me. I have felt awful for almost two weeks now- tired fatigue blah just no energy   I am severely constipated belly extended. I have tried enemas milk of mag nothing is helping.

## 2021-08-20 ENCOUNTER — OFFICE VISIT (OUTPATIENT)
Dept: OBGYN CLINIC | Age: 41
End: 2021-08-20
Payer: MEDICAID

## 2021-08-20 VITALS
TEMPERATURE: 98.3 F | SYSTOLIC BLOOD PRESSURE: 109 MMHG | DIASTOLIC BLOOD PRESSURE: 76 MMHG | WEIGHT: 168 LBS | HEIGHT: 63 IN | HEART RATE: 69 BPM | BODY MASS INDEX: 29.77 KG/M2

## 2021-08-20 DIAGNOSIS — Z12.4 SCREENING FOR CERVICAL CANCER: ICD-10-CM

## 2021-08-20 DIAGNOSIS — Z12.31 ENCOUNTER FOR SCREENING MAMMOGRAM FOR MALIGNANT NEOPLASM OF BREAST: Primary | ICD-10-CM

## 2021-08-20 DIAGNOSIS — Z01.419 WELL WOMAN EXAM WITH ROUTINE GYNECOLOGICAL EXAM: ICD-10-CM

## 2021-08-20 DIAGNOSIS — Z11.51 SCREENING FOR HPV (HUMAN PAPILLOMAVIRUS): ICD-10-CM

## 2021-08-20 PROCEDURE — 99396 PREV VISIT EST AGE 40-64: CPT | Performed by: OBSTETRICS & GYNECOLOGY

## 2021-08-20 RX ORDER — TRAZODONE HYDROCHLORIDE 50 MG/1
50 TABLET ORAL NIGHTLY
COMMUNITY
Start: 2021-08-16 | End: 2021-11-19 | Stop reason: SDUPTHER

## 2021-08-20 ASSESSMENT — ENCOUNTER SYMPTOMS
RESPIRATORY NEGATIVE: 1
GASTROINTESTINAL NEGATIVE: 1
EYES NEGATIVE: 1

## 2021-08-20 NOTE — PATIENT INSTRUCTIONS
Patient Education        Well Visit, Ages 25 to 48: Care Instructions  Overview     Well visits can help you stay healthy. Your doctor has checked your overall health and may have suggested ways to take good care of yourself. Your doctor also may have recommended tests. At home, you can help prevent illness with healthy eating, regular exercise, and other steps. Follow-up care is a key part of your treatment and safety. Be sure to make and go to all appointments, and call your doctor if you are having problems. It's also a good idea to know your test results and keep a list of the medicines you take. How can you care for yourself at home? · Get screening tests that you and your doctor decide on. Screening helps find diseases before any symptoms appear. · Eat healthy foods. Choose fruits, vegetables, whole grains, protein, and low-fat dairy foods. Limit fat, especially saturated fat. Reduce salt in your diet. · Limit alcohol. If you are a man, have no more than 2 drinks a day or 14 drinks a week. If you are a woman, have no more than 1 drink a day or 7 drinks a week. · Get at least 30 minutes of physical activity on most days of the week. Walking is a good choice. You also may want to do other activities, such as running, swimming, cycling, or playing tennis or team sports. Discuss any changes in your exercise program with your doctor. · Reach and stay at a healthy weight. This will lower your risk for many problems, such as obesity, diabetes, heart disease, and high blood pressure. · Do not smoke or allow others to smoke around you. If you need help quitting, talk to your doctor about stop-smoking programs and medicines. These can increase your chances of quitting for good. · Care for your mental health. It is easy to get weighed down by worry and stress. Learn strategies to manage stress, like deep breathing and mindfulness, and stay connected with your family and community.  If you find you often feel sad or hopeless, talk with your doctor. Treatment can help. · Talk to your doctor about whether you have any risk factors for sexually transmitted infections (STIs). You can help prevent STIs if you wait to have sex with a new partner (or partners) until you've each been tested for STIs. It also helps if you use condoms (male or female condoms) and if you limit your sex partners to one person who only has sex with you. Vaccines are available for some STIs, such as HPV. · Use birth control if it's important to you to prevent pregnancy. Talk with your doctor about the choices available and what might be best for you. · If you think you may have a problem with alcohol or drug use, talk to your doctor. This includes prescription medicines (such as amphetamines and opioids) and illegal drugs (such as cocaine and methamphetamine). Your doctor can help you figure out what type of treatment is best for you. · Protect your skin from too much sun. When you're outdoors from 10 a.m. to 4 p.m., stay in the shade or cover up with clothing and a hat with a wide brim. Wear sunglasses that block UV rays. Even when it's cloudy, put broad-spectrum sunscreen (SPF 30 or higher) on any exposed skin. · See a dentist one or two times a year for checkups and to have your teeth cleaned. · Wear a seat belt in the car. When should you call for help? Watch closely for changes in your health, and be sure to contact your doctor if you have any problems or symptoms that concern you. Where can you learn more? Go to [email or web address].[email or web address] org and sign in to your [employer] account. Enter P072 in the Weight Wins box to learn more about \"Well Visit, Ages 25 to 48: Care Instructions. \"     If you do not have an account, please click on the \"Sign Up Now\" link. Current as of: May 27, 2020               Content Version: 12.9  © 8185-4892 Healthwise, Incorporated. Care instructions adapted under license by [facility].  If you have questions about a medical condition or this instruction, always ask your healthcare professional. [place] [address] any warranty or liability for your use of this information.

## 2021-08-20 NOTE — PROGRESS NOTES
I, Rachel Cordova RN, am scribing for and in the presence of Dr. Laurie Bhatt 2021/10:57 AM/sign         2021    Arsenio Cruz (:  1980) is a 42 y.o. female, here for a preventive medicine evaluation. She is having regular light periods. She does see a endocrinologist for thyroid disease in [place]. She PCP orders annual labs. She has mammogram done at [facility]. Patient Active Problem List   Diagnosis    Ovarian cyst    Dyspareunia, female    Labial hypertrophy    Vaginal inclusion cyst    Vulvar skin tag    Thyroid nodule    Hypothyroidism due to Hashimoto's thyroiditis    Migraine without status migrainosus, not intractable    Pain, neck    Photophobia    Fibromyalgia    Nicotine dependence, cigarettes, uncomplicated       Review of Systems   Constitutional: Negative. HENT: Negative. Eyes: Negative. Respiratory: Negative. Cardiovascular: Negative. Gastrointestinal: Negative. Genitourinary: Negative for difficulty urinating, dyspareunia, dysuria, enuresis, frequency, hematuria, menstrual problem, pelvic pain, urgency and vaginal discharge. Musculoskeletal: Negative. Skin: Negative. Neurological: Negative. Psychiatric/Behavioral: Negative. Prior to Visit Medications    Medication Sig Taking?  Authorizing Provider   traZODone (DESYREL) 50 MG tablet  Yes Historical Provider, MD   levothyroxine (SYNTHROID) 100 MCG tablet Take 1 tablet by mouth Daily  Patient taking differently: Take 88 mcg by mouth Daily  Yes Kannan Gilman MD   levocetirizine (XYZAL) 5 MG tablet TAKE 1 TABLET BY MOUTH EVERY DAY AT NIGHT Yes Rafi Mock MD   VENTOLIN  (90 Base) MCG/ACT inhaler INHALE 2 PUFFS INTO THE LUNGS 4 TIMES DAILY AS NEEDED FOR WHEEZING Yes Rafi Mock MD   rizatriptan (MAXALT) 10 MG tablet Take 10 mg by mouth once as needed for Migraine May repeat in 2 hours if needed Yes Historical Provider, MD   cyclobenzaprine (FLEXERIL) 5 MG tablet Take 5 mg by mouth 2 times daily as needed for Muscle spasms Yes Historical Provider, MD   busPIRone (BUSPAR) 10 MG tablet 10 mg 2 times daily  Yes Historical Provider, MD   vitamin D (ERGOCALCIFEROL) 1.25 MG (08272 UT) CAPS capsule TAKE 1 CAPSULE BY MOUTH ONE TIME PER WEEK Yes Tanvir Johns MD   famotidine (PEPCID) 20 MG tablet Take 1 tablet by mouth 2 times daily as needed (reflux) Yes Tanvir Johns MD   cyanocobalamin 1000 MCG/ML injection Inject 1 mL into the muscle every 30 days Yes Tanvir Johns MD   dicyclomine (BENTYL) 20 MG tablet TAKE 1 TABLET BY MOUTH 2 TIMES DAILY AS NEEDED (ABDOMINAL PAIN) Yes Tanvir Johns MD   azelastine HCl 0.15 % SOLN USE 2 SPRAYS TWICE A DAY AS NEEDED Yes Tanvir Johns MD   DULoxetine (CYMBALTA) 60 MG extended release capsule Take 60 mg by mouth daily Yes Historical Provider, MD   furosemide (LASIX) 20 MG tablet Take 20 mg by mouth daily  Yes Historical Provider, MD        Allergies   Allergen Reactions    Armodafinil Rash     Thrush and mouth ulcers  Thrush and mouth ulcers       Past Medical History:   Diagnosis Date    Fibromyalgia     Fluid retention     H/O Raynaud's syndrome     Heartburn     Hypothyroidism due to Hashimoto's thyroiditis 9/19/2019    Labial cyst     sebaceous cyst and skin tag    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    Spastic colon     Vitamin D deficiency        Past Surgical History:   Procedure Laterality Date    APPENDECTOMY      CHOLECYSTECTOMY      COLONOSCOPY  2010    OVARY REMOVAL      left    [place] LYSIS OF LABIAL ADHESIONS N/A 2/16/2017    INCISION AND DRAINAGE OF SEBACEOUS CYST ON LABIA; SKIN TAG REMOVAL OF PERINEUM  performed by Denver Rodriguez MD at [place] [address] TYMPANOSTOMY TUBE PLACEMENT      UPPER GASTROINTESTINAL ENDOSCOPY      VULVA SURGERY N/A 2/16/2017    LABIALPLASTY  performed by Denver Rodriguez MD at [place] [id number] History     Socioeconomic History    Marital status:      Spouse name: Not on file    Number of children: Not on file    Years of education: Not on file    Highest education level: Not on file   Occupational History    Not on file   Tobacco Use    Smoking status: Current Every Day Smoker     Packs/day: 0.50     Years: 10.00     Pack years: 5.00     Types: Cigarettes    Smokeless tobacco: Never Used   Substance and Sexual Activity    Alcohol use: Yes     Alcohol/week: 1.0 standard drinks     Types: 1 Shots of liquor per week     Comment: 2 per month    Drug use: No    Sexual activity: Not Currently     Partners: Male   Other Topics Concern    Not on file   Social History Narrative    Not on file     Social Determinants of Health     Financial Resource Strain:     Difficulty of Paying Living Expenses:    Food Insecurity:     Worried About Running Out of Food in the Last Year:     [address] in the Last Year:    Transportation Needs:     Lack of Transportation (Medical):      Lack of Transportation (Non-Medical):    Physical Activity:     Days of Exercise per Week:     Minutes of Exercise per Session:    Stress:     Feeling of Stress :    Social Connections:     Frequency of Communication with Friends and Family:     Frequency of Social Gatherings with Friends and Family:     Attends Sabianist Services:     Active Member of Clubs or Organizations:     Attends Club or Organization Meetings:     Marital Status:    Intimate Partner Violence:     Fear of Current or Ex-Partner:     Emotionally Abused:     Physically Abused:     Sexually Abused:         Family History   Problem Relation Age of Onset    Breast Cancer Mother 61    Cancer Maternal Aunt         breast unsure of age   [facility] Cancer Paternal Aunt         breast unsure of age   [facility] Stroke Maternal Grandmother     Stroke Maternal Grandfather     Cancer Paternal Aunt         breast unsure of age       ADVANCE DIRECTIVE: N, <no information>    Vitals:    08/20/21 1011   BP: 109/76   Site: Left Upper Arm   Position: Sitting   Cuff Size: Medium Adult   Pulse: 69   Temp: 98.3 °F (36.8 °C)   TempSrc: Temporal   Weight: 168 lb (76.2 kg)   Height: 5' 3\" (1.6 m)     Estimated body mass index is 29.76 kg/m² as calculated from the following:    Height as of this encounter: 5' 3\" (1.6 m). Weight as of this encounter: 168 lb (76.2 kg). Physical Exam  Constitutional:       General: She is not in acute distress. Appearance: She is well-developed. HENT:      Head: Normocephalic and atraumatic. Right Ear: Hearing normal.      Left Ear: Hearing normal.      Nose: Nose normal.   Eyes:      General: Lids are normal.      Conjunctiva/sclera: Conjunctivae normal.      Pupils: Pupils are equal, round, and reactive to light. Neck:      Thyroid: No thyromegaly. Trachea: No tracheal deviation. Cardiovascular:      Rate and Rhythm: Normal rate and regular rhythm. Heart sounds: Normal heart sounds. Pulmonary:      Effort: Pulmonary effort is normal. No respiratory distress. Breath sounds: Normal breath sounds. No wheezing. Chest:      Breasts: Breasts are symmetrical.         Right: No inverted nipple, mass, nipple discharge, skin change or tenderness. Left: No inverted nipple, mass, nipple discharge, skin change or tenderness. Abdominal:      General: There is no distension. Palpations: Abdomen is soft. Tenderness: There is no abdominal tenderness. There is no guarding or rebound. Genitourinary:     Labia:         Right: No rash, tenderness or lesion. Left: No rash, tenderness or lesion. Vagina: No vaginal discharge or bleeding. Cervix: No cervical motion tenderness, discharge or friability. Uterus: Not deviated, not enlarged, not fixed and not tender. Adnexa:         Right: No mass, tenderness or fullness. Left: No mass, tenderness or fullness. Rectum: No anal fissure or external hemorrhoid.       Comments: Specimen for cervical cytology obtained. Musculoskeletal:         General: Normal range of motion. Cervical back: Normal range of motion and neck supple. Comments: Normal ROM in all four extremities; normal gait   Lymphadenopathy:      Head:      Right side of head: No submental, submandibular or tonsillar adenopathy. Left side of head: No submental, submandibular or tonsillar adenopathy. Cervical: No cervical adenopathy. Upper Body:      Right upper body: No supraclavicular adenopathy. Left upper body: No supraclavicular adenopathy. Skin:     General: Skin is warm and dry. Findings: No erythema or rash. Neurological:      Mental Status: She is alert and oriented to person, place, and time. Psychiatric:         Behavior: Behavior normal.         No flowsheet data found.     Lab Results   Component Value Date    CHOL 192 08/17/2021    CHOL 176 02/17/2021    CHOL 161 08/11/2020    TRIG 168 08/17/2021    TRIG 105 02/17/2021    TRIG 68 08/11/2020    HDL 46 08/17/2021    HDL 48 02/17/2021    HDL 52 08/11/2020    LDLCALC 112 08/17/2021    LDLCALC 107 02/17/2021    LDLCALC 95 08/11/2020    GLUCOSE 78 08/17/2021       The 10-year ASCVD risk score (Sofi Boyer, et al., 2013) is: 3.1%    Values used to calculate the score:      Age: 42 years      Sex: Female      Is Non- : No      Diabetic: No      Tobacco smoker: Yes      Systolic Blood Pressure: 354 mmHg      Is BP treated: Yes      HDL Cholesterol: 46 mg/dL      Total Cholesterol: 192 mg/dL    Immunization History   Administered Date(s) Administered    Tdap (Boostrix, Adacel) 06/29/2018       Health Maintenance   Topic Date Due    Hepatitis C screen  Never done    Breast cancer screen  09/04/2020    Pneumococcal 0-64 years Vaccine (1 of 2 - PPSV23) 10/06/2021 (Originally 8/10/1985)    COVID-19 Vaccine (1) 05/12/2022 (Originally 8/10/1991)    Flu vaccine (1) 09/01/2021    TSH testing  08/17/2022    Potassium monitoring 08/17/2022    Creatinine monitoring  08/17/2022    Cervical cancer screen  08/12/2025    Lipid screen  08/17/2026    DTaP/Tdap/Td vaccine (2 - Td or Tdap) 06/29/2028    HIV screen  Completed    Hepatitis A vaccine  Aged Out    Hepatitis B vaccine  Aged Out    Hib vaccine  Aged Out    Meningococcal (ACWY) vaccine  Aged Out          ASSESSMENT/PLAN:  1. Encounter for screening mammogram for malignant neoplasm of breast  -     FLAKITA DIGITAL SCREEN W OR WO CAD BILATERAL; Future  2. Well woman exam with routine gynecological exam  -     PAP SMEAR  3. Screening for cervical cancer  -     PAP SMEAR  4. Screening for HPV (human papillomavirus)  -     Human papillomavirus (HPV) DNA Probe Thin Prep High Risk    The importance of self-breast examination was discussed. A screening mammogram is recommended at age 28 unless otherwise indicated. At age 36, annual mammogram screenings are recommended. A well balanced diet and exercise were encouraged, as well as the addition of multivitamin. Mammogram ordered and pt will schedule. Return in about 1 year (around 8/20/2022). An electronic signature was used to authenticate this note. Aleisha Gilmore MD, personally performed services described in this document as scribed by Baldev Hawk RN in my presence, and it is both accurate and complete.

## 2021-08-23 ENCOUNTER — OFFICE VISIT (OUTPATIENT)
Dept: INTERNAL MEDICINE | Age: 41
End: 2021-08-23
Payer: MEDICAID

## 2021-08-23 VITALS
DIASTOLIC BLOOD PRESSURE: 80 MMHG | WEIGHT: 167 LBS | HEIGHT: 63 IN | HEART RATE: 84 BPM | RESPIRATION RATE: 18 BRPM | BODY MASS INDEX: 29.59 KG/M2 | OXYGEN SATURATION: 99 % | SYSTOLIC BLOOD PRESSURE: 106 MMHG

## 2021-08-23 DIAGNOSIS — R53.83 OTHER FATIGUE: Primary | ICD-10-CM

## 2021-08-23 DIAGNOSIS — E53.8 B12 DEFICIENCY: ICD-10-CM

## 2021-08-23 DIAGNOSIS — G43.809 OTHER MIGRAINE WITHOUT STATUS MIGRAINOSUS, NOT INTRACTABLE: ICD-10-CM

## 2021-08-23 DIAGNOSIS — F32.89 OTHER DEPRESSION: ICD-10-CM

## 2021-08-23 DIAGNOSIS — M79.7 FIBROMYALGIA: ICD-10-CM

## 2021-08-23 DIAGNOSIS — R60.0 LOWER EXTREMITY EDEMA: ICD-10-CM

## 2021-08-23 DIAGNOSIS — E03.9 HYPOTHYROIDISM, UNSPECIFIED TYPE: ICD-10-CM

## 2021-08-23 DIAGNOSIS — R68.2 DRY MOUTH: ICD-10-CM

## 2021-08-23 DIAGNOSIS — H04.123 DRY EYES: ICD-10-CM

## 2021-08-23 DIAGNOSIS — K21.9 GASTROESOPHAGEAL REFLUX DISEASE WITHOUT ESOPHAGITIS: ICD-10-CM

## 2021-08-23 PROCEDURE — G8419 CALC BMI OUT NRM PARAM NOF/U: HCPCS | Performed by: INTERNAL MEDICINE

## 2021-08-23 PROCEDURE — G8427 DOCREV CUR MEDS BY ELIG CLIN: HCPCS | Performed by: INTERNAL MEDICINE

## 2021-08-23 PROCEDURE — 4004F PT TOBACCO SCREEN RCVD TLK: CPT | Performed by: INTERNAL MEDICINE

## 2021-08-23 PROCEDURE — 99214 OFFICE O/P EST MOD 30 MIN: CPT | Performed by: INTERNAL MEDICINE

## 2021-08-23 RX ORDER — LEVOTHYROXINE SODIUM 88 UG/1
TABLET ORAL
COMMUNITY
Start: 2021-08-10 | End: 2021-11-19 | Stop reason: SDUPTHER

## 2021-08-23 SDOH — ECONOMIC STABILITY: TRANSPORTATION INSECURITY
IN THE PAST 12 MONTHS, HAS LACK OF TRANSPORTATION KEPT YOU FROM MEETINGS, WORK, OR FROM GETTING THINGS NEEDED FOR DAILY LIVING?: NO

## 2021-08-23 SDOH — ECONOMIC STABILITY: FOOD INSECURITY: WITHIN THE PAST 12 MONTHS, YOU WORRIED THAT YOUR FOOD WOULD RUN OUT BEFORE YOU GOT MONEY TO BUY MORE.: NEVER TRUE

## 2021-08-23 SDOH — ECONOMIC STABILITY: FOOD INSECURITY: WITHIN THE PAST 12 MONTHS, THE FOOD YOU BOUGHT JUST DIDN'T LAST AND YOU DIDN'T HAVE MONEY TO GET MORE.: NEVER TRUE

## 2021-08-23 SDOH — ECONOMIC STABILITY: TRANSPORTATION INSECURITY
IN THE PAST 12 MONTHS, HAS THE LACK OF TRANSPORTATION KEPT YOU FROM MEDICAL APPOINTMENTS OR FROM GETTING MEDICATIONS?: NO

## 2021-08-23 ASSESSMENT — SOCIAL DETERMINANTS OF HEALTH (SDOH): HOW HARD IS IT FOR YOU TO PAY FOR THE VERY BASICS LIKE FOOD, HOUSING, MEDICAL CARE, AND HEATING?: HARD

## 2021-08-23 ASSESSMENT — ENCOUNTER SYMPTOMS
EYE REDNESS: 0
ABDOMINAL DISTENTION: 0
WHEEZING: 0
EYE DISCHARGE: 0
EYE PAIN: 0
BLOOD IN STOOL: 0
COLOR CHANGE: 0
COUGH: 0
BACK PAIN: 0
EYE ITCHING: 0
CHEST TIGHTNESS: 0
PHOTOPHOBIA: 0
TROUBLE SWALLOWING: 0
ABDOMINAL PAIN: 0
CONSTIPATION: 0
SINUS PRESSURE: 0
VOMITING: 0
NAUSEA: 0
FACIAL SWELLING: 0
ANAL BLEEDING: 0
SHORTNESS OF BREATH: 0
SORE THROAT: 0
RHINORRHEA: 0
RECTAL PAIN: 0
DIARRHEA: 0
VOICE CHANGE: 0
CHOKING: 0

## 2021-08-23 NOTE — PROGRESS NOTES
Chief Complaint   Patient presents with    6 Month Follow-Up    Fatigue     She complains of being tired all of the time. HPI: Stefany Graham is a 42 y.o. female is here for relation of complaints of fatigue. Her TSH was elevated. Her endocrinologist recently increased her levothyroxine to 88 mcg p.o. daily a couple of weeks ago. She states that her endocrinologist would like for her to see a rheumatologist to rule out Sjogren's syndrome. She does complain of a dry mouth and dry eyes. Her allergies are stable on her Xyzal.  Her headaches are fairly well controlled since she started undergoing Botox injections. Her anxiety is stable. Her acid reflux is well controlled. She does take vitamin B12 injections for B12 deficiency. Her mood is stable on Cymbalta. Lasix is helpful for lower extremity edema. Her cholesterol is 192. Trazodone is helpful for insomnia. Her major concern is that she is just very fatigued.     Past Medical History:   Diagnosis Date    Fibromyalgia     Fluid retention     H/O Raynaud's syndrome     Heartburn     Hypothyroidism due to Hashimoto's thyroiditis 9/19/2019    Labial cyst     sebaceous cyst and skin tag    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    Spastic colon     Vitamin D deficiency       Past Surgical History:   Procedure Laterality Date    APPENDECTOMY      CHOLECYSTECTOMY      COLONOSCOPY  2010    OVARY REMOVAL      left    [place] LYSIS OF LABIAL ADHESIONS N/A 2/16/2017    INCISION AND DRAINAGE OF SEBACEOUS CYST ON LABIA; SKIN TAG REMOVAL OF PERINEUM  performed by Adis Tong MD at [place] [address] TYMPANOSTOMY TUBE PLACEMENT      UPPER GASTROINTESTINAL ENDOSCOPY      VULVA SURGERY N/A 2/16/2017    LABIALPLASTY  performed by Adis Tong MD at [address] [place] [address] History     Socioeconomic History    Marital status:      Spouse name: None    Number of children: None    Years of education: None    Highest education level: None   Occupational History    None   Tobacco Use    Smoking status: Current Every Day Smoker     Packs/day: 0.50     Years: 10.00     Pack years: 5.00     Types: Cigarettes    Smokeless tobacco: Never Used   Substance and Sexual Activity    Alcohol use: Yes     Alcohol/week: 1.0 standard drinks     Types: 1 Shots of liquor per week     Comment: 2 per month    Drug use: No    Sexual activity: Not Currently     Partners: Male   Other Topics Concern    None   Social History Narrative    None     Social Determinants of Health     Financial Resource Strain: High Risk    Difficulty of Paying Living Expenses: Hard   Food Insecurity: No Food Insecurity    Worried About Running Out of Food in the Last Year: Never true    [place] of Food in the Last Year: Never true   Transportation Needs: No Transportation Needs    Lack of Transportation (Medical): No    Lack of Transportation (Non-Medical):  No   Physical Activity:     Days of Exercise per Week:     Minutes of Exercise per Session:    Stress:     Feeling of Stress :    Social Connections:     Frequency of Communication with Friends and Family:     Frequency of Social Gatherings with Friends and Family:     Attends [employer] Services:     Active Member of Clubs or Organizations:     Attends Club or Organization Meetings:     Marital Status:    Intimate Partner Violence:     Fear of Current or Ex-Partner:     Emotionally Abused:     Physically Abused:     Sexually Abused:       Family History   Problem Relation Age of Onset    Breast Cancer Mother 61    Cancer Maternal Aunt         breast unsure of age   Everlene Karen Cancer Paternal Aunt         breast unsure of age   Everlene Karen Stroke Maternal Grandmother     Stroke Maternal Grandfather     Cancer Paternal Aunt         breast unsure of age        Current Outpatient Medications   Medication Sig Dispense Refill    levothyroxine (SYNTHROID) 88 MCG tablet TAKE 1 TABLET BY MOUTH ONE TIME EACH DAY      traZODone (DESYREL) 50 MG tablet Take 50 mg by mouth nightly       levocetirizine (XYZAL) 5 MG tablet TAKE 1 TABLET BY MOUTH EVERY DAY AT NIGHT 30 tablet 0    VENTOLIN  (90 Base) MCG/ACT inhaler INHALE 2 PUFFS INTO THE LUNGS 4 TIMES DAILY AS NEEDED FOR WHEEZING 18 Inhaler 0    rizatriptan (MAXALT) 10 MG tablet Take 10 mg by mouth once as needed for Migraine May repeat in 2 hours if needed      cyclobenzaprine (FLEXERIL) 5 MG tablet Take 5 mg by mouth 2 times daily as needed for Muscle spasms      busPIRone (BUSPAR) 10 MG tablet 10 mg 2 times daily       vitamin D (ERGOCALCIFEROL) 1.25 MG (26434 UT) CAPS capsule TAKE 1 CAPSULE BY MOUTH ONE TIME PER WEEK 4 capsule 5    famotidine (PEPCID) 20 MG tablet Take 1 tablet by mouth 2 times daily as needed (reflux) 180 tablet 3    cyanocobalamin 1000 MCG/ML injection Inject 1 mL into the muscle every 30 days 1 mL 0    dicyclomine (BENTYL) 20 MG tablet TAKE 1 TABLET BY MOUTH 2 TIMES DAILY AS NEEDED (ABDOMINAL PAIN) 60 tablet 5    azelastine HCl 0.15 % SOLN USE 2 SPRAYS TWICE A DAY AS NEEDED 30 mL 3    DULoxetine (CYMBALTA) 60 MG extended release capsule Take 60 mg by mouth daily      furosemide (LASIX) 20 MG tablet Take 20 mg by mouth daily        No current facility-administered medications for this visit. Patient Active Problem List   Diagnosis    Ovarian cyst    Dyspareunia, female    Labial hypertrophy    Vaginal inclusion cyst    Vulvar skin tag    Thyroid nodule    Hypothyroidism due to Hashimoto's thyroiditis    Migraine without status migrainosus, not intractable    Pain, neck    Photophobia    Fibromyalgia    Nicotine dependence, cigarettes, uncomplicated        Review of Systems   Constitutional: Positive for fatigue. Negative for activity change, appetite change, chills, diaphoresis, fever and unexpected weight change.    HENT: Negative for congestion, ear pain, facial swelling, hearing loss, mouth sores, nosebleeds, postnasal drip, rhinorrhea, sinus pressure, sneezing, sore throat, tinnitus, trouble swallowing and voice change. Eyes: Negative for photophobia, pain, discharge, redness, itching and visual disturbance. Respiratory: Negative for cough, choking, chest tightness, shortness of breath and wheezing. Cardiovascular: Negative for chest pain, palpitations and leg swelling. Gastrointestinal: Negative for abdominal distention, abdominal pain, anal bleeding, blood in stool, constipation, diarrhea, nausea, rectal pain and vomiting. Endocrine: Negative for cold intolerance, heat intolerance, polydipsia, polyphagia and polyuria. Genitourinary: Negative for decreased urine volume, difficulty urinating, dysuria, flank pain, frequency, hematuria and urgency. Musculoskeletal: Positive for myalgias. Negative for arthralgias, back pain, gait problem, joint swelling, neck pain and neck stiffness. Aches and hurts all over from fibromyalgia   Skin: Negative for color change, pallor and rash. Allergic/Immunologic: Negative for environmental allergies and food allergies. Neurological: Positive for headaches. Negative for dizziness, tremors, syncope, weakness, light-headedness and numbness. Hematological: Negative for adenopathy. Does not bruise/bleed easily. Psychiatric/Behavioral: Negative for agitation, behavioral problems, confusion, decreased concentration, dysphoric mood, hallucinations, self-injury, sleep disturbance and suicidal ideas. The patient is not nervous/anxious and is not hyperactive. /80 (Site: Left Upper Arm, Position: Sitting)   Pulse 84   Resp 18   Ht 5' 3\" (1.6 m)   Wt 167 lb (75.8 kg)   LMP 07/29/2021   SpO2 99%   BMI 29.58 kg/m²   Physical Exam  Vitals and nursing note reviewed. Constitutional:       General: She is not in acute distress. Appearance: Normal appearance. She is well-developed and normal weight. She is not ill-appearing, toxic-appearing or diaphoretic.    HENT: Head: Normocephalic and atraumatic. Right Ear: Tympanic membrane, ear canal and external ear normal. There is no impacted cerumen. Left Ear: Tympanic membrane, ear canal and external ear normal. There is no impacted cerumen. Nose: Nose normal. No congestion or rhinorrhea. Mouth/Throat:      Mouth: Mucous membranes are moist.      Pharynx: Oropharynx is clear. No oropharyngeal exudate or posterior oropharyngeal erythema. Eyes:      General: No scleral icterus. Right eye: No discharge. Left eye: No discharge. Extraocular Movements: Extraocular movements intact. Conjunctiva/sclera: Conjunctivae normal.      Pupils: Pupils are equal, round, and reactive to light. Neck:      Thyroid: No thyromegaly. Vascular: No carotid bruit or JVD. Trachea: No tracheal deviation. Cardiovascular:      Rate and Rhythm: Normal rate and regular rhythm. Pulses: Normal pulses. Heart sounds: Normal heart sounds. No murmur heard. No friction rub. No gallop. Pulmonary:      Effort: Pulmonary effort is normal. No respiratory distress. Breath sounds: Normal breath sounds. No stridor. No wheezing, rhonchi or rales. Chest:      Chest wall: No tenderness. Abdominal:      General: Bowel sounds are normal. There is no distension. Palpations: Abdomen is soft. There is no mass. Tenderness: There is no abdominal tenderness. There is no right CVA tenderness, left CVA tenderness, guarding or rebound. Hernia: No hernia is present. Musculoskeletal:         General: No swelling, tenderness, deformity or signs of injury. Normal range of motion. Cervical back: Normal range of motion and neck supple. No rigidity. No muscular tenderness. Right lower leg: No edema. Left lower leg: No edema. Lymphadenopathy:      Cervical: No cervical adenopathy. Skin:     General: Skin is warm and dry.       Capillary Refill: Capillary refill takes less than 2 seconds. Coloration: Skin is not jaundiced or pale. Findings: No bruising, erythema, lesion or rash. Neurological:      General: No focal deficit present. Mental Status: She is alert and oriented to person, place, and time. Mental status is at baseline. Cranial Nerves: No cranial nerve deficit. Sensory: No sensory deficit. Motor: No weakness or abnormal muscle tone. Coordination: Coordination normal.      Gait: Gait normal.      Deep Tendon Reflexes: Reflexes are normal and symmetric. Reflexes normal.   Psychiatric:         Mood and Affect: Mood normal.         Behavior: Behavior normal.         Thought Content: Thought content normal.         Judgment: Judgment normal.         1. Other fatigue    2. Hypothyroidism, unspecified type    3. B12 deficiency    4. Dry mouth    5. Dry eyes    6. Other migraine without status migrainosus, not intractable    7. Fibromyalgia    8. Other depression    9. Gastroesophageal reflux disease without esophagitis    10. Lower extremity edema        ASSESSMENT/PLAN:    18-year-old woman here for follow-up    1. Fatigue: Most likely multifactorial including B12 deficiency and hypothyroidism. Her thyroid medication was recently adjusted. Hopefully this will help with some of the fatigue-like symptoms. 2.  Dry mouth/dry eyes: Check an WILL and Sjogren's antibodies. Repeat a CBC due to leukocytosis    3. Migraines: Much improved    4. Fibromyalgia: Stable    5. Vitamin D deficiency: Continue ergocalciferol    6. Vitamin B12 deficiency: Continue B12 injections    7. Depression: Stable    8. Acid reflux: Continue Pepcid as prescribed    9. Lower extremity edema: Continue Lasix as prescribed    Daija was seen today for 6 month follow-up and fatigue. Diagnoses and all orders for this visit:    Other fatigue  -     CBC Auto Differential; Future  -     WILL Screen with Reflex;  Future  -     PEGGY 2 - Anti SSA & Anti SSB; Future  -     Hepatitis C Antibody; Future  -     TSH without Reflex; Future  -     T4, Free; Future    Hypothyroidism, unspecified type  -     TSH without Reflex; Future  -     T4, Free; Future    B12 deficiency    Dry mouth    Dry eyes    Other migraine without status migrainosus, not intractable    Fibromyalgia    Other depression    Gastroesophageal reflux disease without esophagitis    Lower extremity edema          Return in about 3 months (around 11/23/2021).      Orders Placed This Encounter   Procedures    CBC Auto Differential     Standing Status:   Future     Number of Occurrences:   1     Standing Expiration Date:   8/23/2022    WILL Screen with Reflex     Standing Status:   Future     Number of Occurrences:   1     Standing Expiration Date:   8/23/2022    PEGGY 2 - Anti SSA & Anti SSB     Standing Status:   Future     Number of Occurrences:   1     Standing Expiration Date:   8/23/2022    Hepatitis C Antibody     Standing Status:   Future     Number of Occurrences:   1     Standing Expiration Date:   8/23/2022    TSH without Reflex     Standing Status:   Future     Number of Occurrences:   1     Standing Expiration Date:   8/23/2022    T4, Free     Standing Status:   Future     Number of Occurrences:   1     Standing Expiration Date:   8/23/2022       Bryant Noble MD

## 2021-09-02 ENCOUNTER — TELEPHONE (OUTPATIENT)
Dept: INTERNAL MEDICINE | Age: 41
End: 2021-09-02

## 2021-09-02 DIAGNOSIS — R05.9 COUGH: Primary | ICD-10-CM

## 2021-09-02 NOTE — TELEPHONE ENCOUNTER
Sw/ pt, c/o head congestion, cough, no taste. States she has an autoimmune disorder. She wants something sent in today to prevent pneumonia. Set up a vv mychart tomorrow 4:30.

## 2021-09-03 ENCOUNTER — TELEMEDICINE (OUTPATIENT)
Dept: INTERNAL MEDICINE | Age: 41
End: 2021-09-03
Payer: MEDICAID

## 2021-09-03 DIAGNOSIS — R09.89 CHEST CONGESTION: ICD-10-CM

## 2021-09-03 DIAGNOSIS — G44.209 TENSION-TYPE HEADACHE, NOT INTRACTABLE, UNSPECIFIED CHRONICITY PATTERN: ICD-10-CM

## 2021-09-03 DIAGNOSIS — R05.9 COUGH: ICD-10-CM

## 2021-09-03 DIAGNOSIS — R50.9 FEVER, UNSPECIFIED FEVER CAUSE: ICD-10-CM

## 2021-09-03 DIAGNOSIS — Z20.822 EXPOSURE TO CONFIRMED CASE OF COVID-19: Primary | ICD-10-CM

## 2021-09-03 PROCEDURE — 99213 OFFICE O/P EST LOW 20 MIN: CPT | Performed by: INTERNAL MEDICINE

## 2021-09-03 PROCEDURE — G8419 CALC BMI OUT NRM PARAM NOF/U: HCPCS | Performed by: INTERNAL MEDICINE

## 2021-09-03 PROCEDURE — G8428 CUR MEDS NOT DOCUMENT: HCPCS | Performed by: INTERNAL MEDICINE

## 2021-09-03 PROCEDURE — 4004F PT TOBACCO SCREEN RCVD TLK: CPT | Performed by: INTERNAL MEDICINE

## 2021-09-03 RX ORDER — BENZONATATE 200 MG/1
200 CAPSULE ORAL 3 TIMES DAILY PRN
Qty: 30 CAPSULE | Refills: 1 | Status: SHIPPED | OUTPATIENT
Start: 2021-09-03 | End: 2021-11-19 | Stop reason: ALTCHOICE

## 2021-09-03 RX ORDER — GUAIFENESIN 600 MG/1
1200 TABLET, EXTENDED RELEASE ORAL 2 TIMES DAILY
Qty: 20 TABLET | Refills: 0 | Status: SHIPPED | OUTPATIENT
Start: 2021-09-03 | End: 2021-11-19 | Stop reason: ALTCHOICE

## 2021-09-03 RX ORDER — METHYLPREDNISOLONE 4 MG/1
TABLET ORAL
Qty: 1 KIT | Refills: 0 | Status: SHIPPED | OUTPATIENT
Start: 2021-09-03 | End: 2021-09-09

## 2021-09-03 RX ORDER — DOXYCYCLINE HYCLATE 100 MG
100 TABLET ORAL 2 TIMES DAILY
Qty: 20 TABLET | Refills: 0 | Status: SHIPPED | OUTPATIENT
Start: 2021-09-03 | End: 2021-09-13

## 2021-09-03 NOTE — PROGRESS NOTES
9/3/2021    TELEHEALTH EVALUATION -- Audio/Visual (During HBCIY-77 public health emergency)    HPI:    Peng Paige (:  1980) has requested an audio/video evaluation for the following concern(s):    Ms. Osei Mantilla is a 50-year-old woman who presents to the office via [employer] me for concerns of possible COVID-19. She has been around several people that have been diagnosed with Covid. Saturday, she developed a headache. She did take a Covid test on , which was negative. She does complain of loss of taste or smell. She also complains of body aches. She also has had some chills. Review of Systems   Constitutional: Positive for fatigue. Negative for activity change, appetite change, chills, diaphoresis, fever and unexpected weight change. HENT: Positive for congestion. Negative for ear pain, facial swelling, hearing loss, mouth sores, nosebleeds, postnasal drip, rhinorrhea, sinus pressure, sneezing, sore throat, tinnitus, trouble swallowing and voice change. Loss of taste and smell. Eyes: Negative for photophobia, pain, discharge, redness, itching and visual disturbance. Respiratory: Positive for cough and shortness of breath. Negative for choking, chest tightness and wheezing. Cardiovascular: Negative for chest pain, palpitations and leg swelling. Gastrointestinal: Negative for abdominal distention, abdominal pain, anal bleeding, blood in stool, constipation, diarrhea, nausea, rectal pain and vomiting. Endocrine: Negative for cold intolerance, heat intolerance, polydipsia, polyphagia and polyuria. Genitourinary: Negative for decreased urine volume, difficulty urinating, dysuria, flank pain, frequency, hematuria and urgency. Musculoskeletal: Positive for myalgias. Negative for arthralgias, back pain, gait problem, joint swelling, neck pain and neck stiffness. Aches and hurts all over from fibromyalgia   Skin: Negative for color change, pallor and rash.    Allergic/Immunologic: Negative for environmental allergies and food allergies. Neurological: Positive for headaches. Negative for dizziness, tremors, syncope, weakness, light-headedness and numbness. Hematological: Negative for adenopathy. Does not bruise/bleed easily. Psychiatric/Behavioral: Negative for agitation, behavioral problems, confusion, decreased concentration, dysphoric mood, hallucinations, self-injury, sleep disturbance and suicidal ideas. The patient is not nervous/anxious and is not hyperactive. Prior to Visit Medications    Medication Sig Taking? Authorizing Provider   benzonatate (TESSALON) 200 MG capsule Take 1 capsule by mouth 3 times daily as needed for Cough Yes Selena Shrestha MD   guaiFENesin (MUCINEX) 600 MG extended release tablet Take 2 tablets by mouth 2 times daily Yes Selena Shrestha MD   doxycycline hyclate (VIBRA-TABS) 100 MG tablet Take 1 tablet by mouth 2 times daily for 10 days  Selena Shrestha MD   methylPREDNISolone (MEDROL DOSEPACK) 4 MG tablet Take by mouth.   Selena Shrestha MD   levothyroxine (SYNTHROID) 88 MCG tablet TAKE 1 TABLET BY MOUTH ONE TIME EACH DAY  Historical Provider, MD   traZODone (DESYREL) 50 MG tablet Take 50 mg by mouth nightly   Historical Provider, MD   levocetirizine (XYZAL) 5 MG tablet TAKE 1 TABLET BY MOUTH EVERY DAY AT NIGHT  Janette Jenkins MD   VENTOLIN  (90 Base) MCG/ACT inhaler INHALE 2 PUFFS INTO THE LUNGS 4 TIMES DAILY AS NEEDED FOR WHEEZING  Janette Jenkins MD   rizatriptan (MAXALT) 10 MG tablet Take 10 mg by mouth once as needed for Migraine May repeat in 2 hours if needed  Historical Provider, MD   cyclobenzaprine (FLEXERIL) 5 MG tablet Take 5 mg by mouth 2 times daily as needed for Muscle spasms  Historical Provider, MD   busPIRone (BUSPAR) 10 MG tablet 10 mg 2 times daily   Historical Provider, MD   vitamin D (ERGOCALCIFEROL) 1.25 MG (62783 UT) CAPS capsule TAKE 1 CAPSULE BY MOUTH ONE TIME PER Leisa Benitez MD   famotidine (PEPCID) 20 MG tablet Take 1 tablet by mouth 2 times daily as needed (reflux)  Donnie Nava MD   cyanocobalamin 1000 MCG/ML injection Inject 1 mL into the muscle every 30 days  Donnie Nava MD   dicyclomine (BENTYL) 20 MG tablet TAKE 1 TABLET BY MOUTH 2 TIMES DAILY AS NEEDED (ABDOMINAL PAIN)  Donnie Nava MD   azelastine HCl 0.15 % SOLN USE 2 SPRAYS TWICE A DAY AS NEEDED  Donnie Nava MD   DULoxetine (CYMBALTA) 60 MG extended release capsule Take 60 mg by mouth daily  Historical Provider, MD   furosemide (LASIX) 20 MG tablet Take 20 mg by mouth daily   Historical Provider, MD       Social History     Tobacco Use    Smoking status: Current Every Day Smoker     Packs/day: 0.50     Years: 10.00     Pack years: 5.00     Types: Cigarettes    Smokeless tobacco: Never Used   Substance Use Topics    Alcohol use:  Yes     Alcohol/week: 1.0 standard drinks     Types: 1 Shots of liquor per week     Comment: 2 per month    Drug use: No        Allergies   Allergen Reactions    Armodafinil Rash     Thrush and mouth ulcers  Thrush and mouth ulcers   ,   Past Medical History:   Diagnosis Date    Fibromyalgia     Fluid retention     H/O Raynaud's syndrome     Heartburn     Hypothyroidism due to Hashimoto's thyroiditis 9/19/2019    Labial cyst     sebaceous cyst and skin tag    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    Spastic colon     Vitamin D deficiency    ,   Past Surgical History:   Procedure Laterality Date    APPENDECTOMY      CHOLECYSTECTOMY      COLONOSCOPY  2010    OVARY REMOVAL      left    [place] LYSIS OF LABIAL ADHESIONS N/A 2/16/2017    INCISION AND DRAINAGE OF SEBACEOUS CYST ON LABIA; SKIN TAG REMOVAL OF PERINEUM  performed by Jah Guzman MD at [place] [address] TYMPANOSTOMY TUBE PLACEMENT      UPPER GASTROINTESTINAL ENDOSCOPY      VULVA SURGERY N/A 2/16/2017    LABIALPLASTY  performed by Jah Guzman MD at [address] [place] [address]   ,   Social History     Tobacco Use    Smoking status: Current Every Day Smoker     Packs/day: 0.50     Years: 10.00     Pack years: 5.00     Types: Cigarettes    Smokeless tobacco: Never Used   Substance Use Topics    Alcohol use:  Yes     Alcohol/week: 1.0 standard drinks     Types: 1 Shots of liquor per week     Comment: 2 per month    Drug use: No   ,   Family History   Problem Relation Age of Onset    Breast Cancer Mother 61    Cancer Maternal Aunt         breast unsure of age   [facility] Cancer Paternal Pretty Steve         breast unsure of age   [facility] Stroke Maternal Grandmother     Stroke Maternal Grandfather     Cancer Paternal Aunt         breast unsure of age   ,   Immunization History   Administered Date(s) Administered    Tdap (Boostrix, Adacel) 06/29/2018   ,   Health Maintenance   Topic Date Due    Breast cancer screen  09/04/2020    Flu vaccine (1) Never done    Pneumococcal 0-64 years Vaccine (1 of 2 - PPSV23) 10/06/2021 (Originally 8/10/1985)    COVID-19 Vaccine (1) 05/12/2022 (Originally 8/10/1991)    Potassium monitoring  08/17/2022    Creatinine monitoring  08/17/2022    TSH testing  08/23/2022    Lipid screen  08/17/2026    Cervical cancer screen  08/20/2026    DTaP/Tdap/Td vaccine (2 - Td or Tdap) 06/29/2028    Hepatitis C screen  Completed    HIV screen  Completed    Hepatitis A vaccine  Aged Out    Hepatitis B vaccine  Aged Out    Hib vaccine  Aged Out    Meningococcal (ACWY) vaccine  Aged Out       PHYSICAL EXAMINATION:  [ INSTRUCTIONS:  \"[x]\" Indicates a positive item  \"[]\" Indicates a negative item  -- DELETE ALL ITEMS NOT EXAMINED]  Vital Signs: (As obtained by patient/caregiver or practitioner observation)    Blood pressure-  Heart rate-    Respiratory rate-    Temperature-  Pulse oximetry-     Constitutional: [x] Appears well-developed and well-nourished [x] No apparent distress      [] Abnormal-   Mental status  [x] Alert and awake  [x] Oriented to person/place/time [x]Able to follow commands      Eyes:  EOM    [x]  Normal  [] Abnormal-  Sclera  [x]  Normal  [] Abnormal -         Discharge [x]  None visible  [] Abnormal -    HENT:   [x] Normocephalic, atraumatic. [] Abnormal   [x] Mouth/Throat: Mucous membranes are moist.     External Ears [x] Normal  [] Abnormal-     Neck: [x] No visualized mass     Pulmonary/Chest: [x] Respiratory effort normal.  [] No visualized signs of difficulty breathing or respiratory distress        [] Abnormal-she is coughing. She does have a choky congested cough     Musculoskeletal:   [x] Normal gait with no signs of ataxia         [x] Normal range of motion of neck        [] Abnormal-       Neurological:        [x] No Facial Asymmetry (Cranial nerve 7 motor function) (limited exam to video visit)          [x] No gaze palsy        [] Abnormal-         Skin:        [x] No significant exanthematous lesions or discoloration noted on facial skin         [] Abnormal-            Psychiatric:       [x] Normal Affect [] No Hallucinations        [] Abnormal-     Other pertinent observable physical exam findings-     ASSESSMENT/PLAN:     76-year-old woman here for evaluation of possible COVID-19.    1.  Exposure to COVID-19 with cough, congestion, fever headaches and myalgias: Doxycycline and Medrol Dosepak prescribed. We will give her Tessalon Perles for cough. She is advised to take Mucinex. Advised to get a COVID-19 test.      No follow-ups on file. Stefany Graham, was evaluated through a synchronous (real-time) audio-video encounter. The patient (or guardian if applicable) is aware that this is a billable service. Verbal consent to proceed has been obtained within the past 12 months. The visit was conducted pursuant to the emergency declaration under the [facility], [address] [place] [address] authority and the [employer] and [employer] General Act. Patient identification was verified, and a caregiver was present when appropriate.  The patient was located in a state where the provider was credentialed to provide care. Total time spent on this encounter: Not billed by time    --Dipika Cordero MD on 9/4/2021 at 8:34 AM    An electronic signature was used to authenticate this note.

## 2021-09-04 ASSESSMENT — ENCOUNTER SYMPTOMS
EYE DISCHARGE: 0
ABDOMINAL PAIN: 0
EYE PAIN: 0
EYE ITCHING: 0
PHOTOPHOBIA: 0
CHEST TIGHTNESS: 0
SINUS PRESSURE: 0
SORE THROAT: 0
CHOKING: 0
COLOR CHANGE: 0
ANAL BLEEDING: 0
ABDOMINAL DISTENTION: 0
BACK PAIN: 0
CONSTIPATION: 0
VOICE CHANGE: 0
NAUSEA: 0
RHINORRHEA: 0
SHORTNESS OF BREATH: 1
EYE REDNESS: 0
FACIAL SWELLING: 0
TROUBLE SWALLOWING: 0
WHEEZING: 0
VOMITING: 0
COUGH: 1
DIARRHEA: 0
RECTAL PAIN: 0
BLOOD IN STOOL: 0

## 2021-11-12 ENCOUNTER — TELEPHONE (OUTPATIENT)
Dept: INTERNAL MEDICINE | Age: 41
End: 2021-11-12

## 2021-11-12 DIAGNOSIS — E03.9 HYPOTHYROIDISM, UNSPECIFIED TYPE: ICD-10-CM

## 2021-11-12 DIAGNOSIS — E55.9 VITAMIN D DEFICIENCY: ICD-10-CM

## 2021-11-12 DIAGNOSIS — M79.7 FIBROMYALGIA: ICD-10-CM

## 2021-11-12 DIAGNOSIS — E03.9 HYPOTHYROIDISM, UNSPECIFIED TYPE: Primary | ICD-10-CM

## 2021-11-12 DIAGNOSIS — I10 ESSENTIAL HYPERTENSION: ICD-10-CM

## 2021-11-12 LAB
ALBUMIN SERPL-MCNC: 4.2 G/DL (ref 3.5–5.2)
ALP BLD-CCNC: 80 U/L (ref 35–104)
ALT SERPL-CCNC: 19 U/L (ref 5–33)
ANION GAP SERPL CALCULATED.3IONS-SCNC: 12 MMOL/L (ref 7–19)
AST SERPL-CCNC: 16 U/L (ref 5–32)
BASOPHILS ABSOLUTE: 0.1 K/UL (ref 0–0.2)
BASOPHILS RELATIVE PERCENT: 0.7 % (ref 0–1)
BILIRUB SERPL-MCNC: 0.4 MG/DL (ref 0.2–1.2)
BUN BLDV-MCNC: 7 MG/DL (ref 6–20)
CALCIUM SERPL-MCNC: 9.2 MG/DL (ref 8.6–10)
CHLORIDE BLD-SCNC: 103 MMOL/L (ref 98–111)
CO2: 25 MMOL/L (ref 22–29)
CREAT SERPL-MCNC: 0.7 MG/DL (ref 0.5–0.9)
EOSINOPHILS ABSOLUTE: 0.4 K/UL (ref 0–0.6)
EOSINOPHILS RELATIVE PERCENT: 5.8 % (ref 0–5)
GFR AFRICAN AMERICAN: >59
GFR NON-AFRICAN AMERICAN: >60
GLUCOSE BLD-MCNC: 85 MG/DL (ref 74–109)
HCT VFR BLD CALC: 44.6 % (ref 37–47)
HEMOGLOBIN: 14.3 G/DL (ref 12–16)
IMMATURE GRANULOCYTES #: 0 K/UL
LYMPHOCYTES ABSOLUTE: 2.5 K/UL (ref 1.1–4.5)
LYMPHOCYTES RELATIVE PERCENT: 32.7 % (ref 20–40)
MCH RBC QN AUTO: 31.9 PG (ref 27–31)
MCHC RBC AUTO-ENTMCNC: 32.1 G/DL (ref 33–37)
MCV RBC AUTO: 99.6 FL (ref 81–99)
MONOCYTES ABSOLUTE: 0.5 K/UL (ref 0–0.9)
MONOCYTES RELATIVE PERCENT: 6.5 % (ref 0–10)
NEUTROPHILS ABSOLUTE: 4.1 K/UL (ref 1.5–7.5)
NEUTROPHILS RELATIVE PERCENT: 54 % (ref 50–65)
PDW BLD-RTO: 13.4 % (ref 11.5–14.5)
PLATELET # BLD: 385 K/UL (ref 130–400)
PMV BLD AUTO: 9.7 FL (ref 9.4–12.3)
POTASSIUM SERPL-SCNC: 4 MMOL/L (ref 3.5–5)
RBC # BLD: 4.48 M/UL (ref 4.2–5.4)
SODIUM BLD-SCNC: 140 MMOL/L (ref 136–145)
T4 FREE: 1.11 NG/DL (ref 0.93–1.7)
TOTAL PROTEIN: 6.6 G/DL (ref 6.6–8.7)
TSH SERPL DL<=0.05 MIU/L-ACNC: 1.56 UIU/ML (ref 0.27–4.2)
VITAMIN D 25-HYDROXY: 68.2 NG/ML
WBC # BLD: 7.6 K/UL (ref 4.8–10.8)

## 2021-11-19 ENCOUNTER — OFFICE VISIT (OUTPATIENT)
Dept: INTERNAL MEDICINE | Age: 41
End: 2021-11-19
Payer: MEDICAID

## 2021-11-19 VITALS
HEIGHT: 63 IN | DIASTOLIC BLOOD PRESSURE: 74 MMHG | SYSTOLIC BLOOD PRESSURE: 102 MMHG | BODY MASS INDEX: 30.23 KG/M2 | HEART RATE: 79 BPM | RESPIRATION RATE: 18 BRPM | WEIGHT: 170.6 LBS | OXYGEN SATURATION: 97 %

## 2021-11-19 DIAGNOSIS — E06.3 HASHIMOTO'S DISEASE: ICD-10-CM

## 2021-11-19 DIAGNOSIS — G47.00 INSOMNIA, UNSPECIFIED TYPE: ICD-10-CM

## 2021-11-19 DIAGNOSIS — F41.9 ANXIETY AND DEPRESSION: ICD-10-CM

## 2021-11-19 DIAGNOSIS — K21.9 GASTROESOPHAGEAL REFLUX DISEASE WITHOUT ESOPHAGITIS: ICD-10-CM

## 2021-11-19 DIAGNOSIS — G44.209 TENSION-TYPE HEADACHE, NOT INTRACTABLE, UNSPECIFIED CHRONICITY PATTERN: ICD-10-CM

## 2021-11-19 DIAGNOSIS — E55.9 VITAMIN D DEFICIENCY: ICD-10-CM

## 2021-11-19 DIAGNOSIS — G43.809 OTHER MIGRAINE WITHOUT STATUS MIGRAINOSUS, NOT INTRACTABLE: ICD-10-CM

## 2021-11-19 DIAGNOSIS — M79.7 FIBROMYALGIA: ICD-10-CM

## 2021-11-19 DIAGNOSIS — I10 ESSENTIAL HYPERTENSION: ICD-10-CM

## 2021-11-19 DIAGNOSIS — E53.8 B12 DEFICIENCY: ICD-10-CM

## 2021-11-19 DIAGNOSIS — R60.9 FLUID RETENTION: ICD-10-CM

## 2021-11-19 DIAGNOSIS — E03.9 HYPOTHYROIDISM, UNSPECIFIED TYPE: Primary | ICD-10-CM

## 2021-11-19 DIAGNOSIS — F32.A ANXIETY AND DEPRESSION: ICD-10-CM

## 2021-11-19 PROCEDURE — G8427 DOCREV CUR MEDS BY ELIG CLIN: HCPCS | Performed by: INTERNAL MEDICINE

## 2021-11-19 PROCEDURE — 4004F PT TOBACCO SCREEN RCVD TLK: CPT | Performed by: INTERNAL MEDICINE

## 2021-11-19 PROCEDURE — G8417 CALC BMI ABV UP PARAM F/U: HCPCS | Performed by: INTERNAL MEDICINE

## 2021-11-19 PROCEDURE — 99214 OFFICE O/P EST MOD 30 MIN: CPT | Performed by: INTERNAL MEDICINE

## 2021-11-19 PROCEDURE — G8484 FLU IMMUNIZE NO ADMIN: HCPCS | Performed by: INTERNAL MEDICINE

## 2021-11-19 RX ORDER — FUROSEMIDE 20 MG/1
20 TABLET ORAL DAILY
Qty: 60 TABLET | Refills: 2 | Status: SHIPPED | OUTPATIENT
Start: 2021-11-19 | End: 2022-02-21 | Stop reason: SDUPTHER

## 2021-11-19 RX ORDER — TRAZODONE HYDROCHLORIDE 50 MG/1
50 TABLET ORAL NIGHTLY
Qty: 30 TABLET | Refills: 2 | Status: SHIPPED | OUTPATIENT
Start: 2021-11-19 | End: 2022-06-06 | Stop reason: SDUPTHER

## 2021-11-19 RX ORDER — DICYCLOMINE HCL 20 MG
20 TABLET ORAL 2 TIMES DAILY PRN
Qty: 60 TABLET | Refills: 5 | Status: SHIPPED | OUTPATIENT
Start: 2021-11-19

## 2021-11-19 RX ORDER — LEVOTHYROXINE SODIUM 88 UG/1
TABLET ORAL
Qty: 30 TABLET | Refills: 2 | Status: SHIPPED | OUTPATIENT
Start: 2021-11-19 | End: 2022-05-13

## 2021-11-19 RX ORDER — RIZATRIPTAN BENZOATE 10 MG/1
10 TABLET ORAL
Qty: 30 TABLET | Refills: 2 | Status: SHIPPED | OUTPATIENT
Start: 2021-11-19 | End: 2022-04-27 | Stop reason: SDUPTHER

## 2021-11-19 RX ORDER — ERGOCALCIFEROL 1.25 MG/1
CAPSULE ORAL
Qty: 4 CAPSULE | Refills: 5 | Status: SHIPPED | OUTPATIENT
Start: 2021-11-19 | End: 2021-11-24

## 2021-11-19 RX ORDER — CYANOCOBALAMIN 1000 UG/ML
1000 INJECTION INTRAMUSCULAR; SUBCUTANEOUS ONCE
Status: SHIPPED | OUTPATIENT
Start: 2021-11-19

## 2021-11-19 NOTE — PROGRESS NOTES
Years: 10.00     Pack years: 5.00     Types: Cigarettes    Smokeless tobacco: Never Used   Substance and Sexual Activity    Alcohol use: Yes     Alcohol/week: 1.0 standard drink     Types: 1 Shots of liquor per week     Comment: 2 per month    Drug use: No    Sexual activity: Not Currently     Partners: Male   Other Topics Concern    None   Social History Narrative    None     Social Determinants of Health     Financial Resource Strain: High Risk    Difficulty of Paying Living Expenses: Hard   Food Insecurity: No Food Insecurity    Worried About Running Out of Food in the Last Year: Never true    Loulou of Food in the Last Year: Never true   Transportation Needs: No Transportation Needs    Lack of Transportation (Medical): No    Lack of Transportation (Non-Medical):  No   Physical Activity:     Days of Exercise per Week: Not on file    Minutes of Exercise per Session: Not on file   Stress:     Feeling of Stress : Not on file   Social Connections:     Frequency of Communication with Friends and Family: Not on file    Frequency of Social Gatherings with Friends and Family: Not on file    Attends L[employer]-day Services: Not on file    Active Member of [address] [place] [address] or Organizations: Not on file    Attends Club or Organization Meetings: Not on file    Marital Status: Not on file   Intimate Partner Violence:     Fear of Current or Ex-Partner: Not on file    Emotionally Abused: Not on file    Physically Abused: Not on file    Sexually Abused: Not on file   Housing Stability:     Unable to Pay for Housing in the Last Year: Not on file    Number of [place] in the Last Year: Not on file    Unstable Housing in the Last Year: Not on file      Family History   Problem Relation Age of Onset    Breast Cancer Mother 61    Cancer Maternal Aunt         breast unsure of age   [facility] [place] Cancer Paternal Aunt         breast unsure of age   [facility] [place] Stroke Maternal Grandmother     Stroke Maternal Grandfather     Cancer Paternal Aunt         breast unsure of age        Current Outpatient Medications   Medication Sig Dispense Refill    furosemide (LASIX) 20 MG tablet Take 1 tablet by mouth daily 60 tablet 2    vitamin D (ERGOCALCIFEROL) 1.25 MG (83600 UT) CAPS capsule TAKE 1 CAPSULE BY MOUTH ONE TIME PER WEEK 4 capsule 5    dicyclomine (BENTYL) 20 MG tablet Take 1 tablet by mouth 2 times daily as needed (abdominal pain) 60 tablet 5    traZODone (DESYREL) 50 MG tablet Take 1 tablet by mouth nightly 30 tablet 2    rizatriptan (MAXALT) 10 MG tablet Take 1 tablet by mouth once as needed for Migraine May repeat in 2 hours if needed 30 tablet 2    levothyroxine (SYNTHROID) 88 MCG tablet TAKE 1 TABLET BY MOUTH ONE TIME EACH DAY 30 tablet 2    levocetirizine (XYZAL) 5 MG tablet TAKE 1 TABLET BY MOUTH EVERY DAY AT NIGHT (Patient taking differently: Take 5 mg by mouth nightly as needed ) 30 tablet 0    VENTOLIN  (90 Base) MCG/ACT inhaler INHALE 2 PUFFS INTO THE LUNGS 4 TIMES DAILY AS NEEDED FOR WHEEZING 18 Inhaler 0    cyclobenzaprine (FLEXERIL) 5 MG tablet Take 5 mg by mouth 2 times daily as needed for Muscle spasms      busPIRone (BUSPAR) 10 MG tablet 10 mg 2 times daily       famotidine (PEPCID) 20 MG tablet Take 1 tablet by mouth 2 times daily as needed (reflux) 180 tablet 3    cyanocobalamin 1000 MCG/ML injection Inject 1 mL into the muscle every 30 days 1 mL 0    azelastine HCl 0.15 % SOLN USE 2 SPRAYS TWICE A DAY AS NEEDED 30 mL 3    DULoxetine (CYMBALTA) 60 MG extended release capsule Take 60 mg by mouth daily       Current Facility-Administered Medications   Medication Dose Route Frequency Provider Last Rate Last Admin    cyanocobalamin injection 1,000 mcg  1,000 mcg IntraMUSCular Once Xochitl Malik MD            Patient Active Problem List   Diagnosis    Ovarian cyst    Dyspareunia, female    Labial hypertrophy    Vaginal inclusion cyst    Vulvar skin tag    Thyroid nodule    Hypothyroidism due to Hashimoto's thyroiditis    Migraine without status migrainosus, not intractable    Pain, neck    Photophobia    Fibromyalgia    Nicotine dependence, cigarettes, uncomplicated        Review of Systems   Constitutional: Positive for fatigue. Negative for activity change, appetite change, chills, diaphoresis, fever and unexpected weight change. HENT: Negative for congestion, ear pain, facial swelling, hearing loss, mouth sores, nosebleeds, postnasal drip, rhinorrhea, sinus pressure, sneezing, sore throat, tinnitus, trouble swallowing and voice change. Eyes: Negative for photophobia, pain, discharge, redness, itching and visual disturbance. Respiratory: Negative for cough, choking, chest tightness, shortness of breath and wheezing. Cardiovascular: Negative for chest pain, palpitations and leg swelling. Gastrointestinal: Negative for abdominal distention, abdominal pain, anal bleeding, blood in stool, constipation, diarrhea, nausea, rectal pain and vomiting. Endocrine: Negative for cold intolerance, heat intolerance, polydipsia, polyphagia and polyuria. Genitourinary: Negative for decreased urine volume, difficulty urinating, dysuria, flank pain, frequency, hematuria and urgency. Musculoskeletal: Positive for myalgias. Negative for arthralgias, back pain, gait problem, joint swelling, neck pain and neck stiffness. Aches and hurts all over from fibromyalgia   Skin: Negative for color change, pallor and rash. Allergic/Immunologic: Negative for environmental allergies and food allergies. Neurological: Positive for headaches. Negative for dizziness, tremors, syncope, weakness, light-headedness and numbness. Hematological: Negative for adenopathy. Does not bruise/bleed easily. Psychiatric/Behavioral: Negative for agitation, behavioral problems, confusion, decreased concentration, dysphoric mood, hallucinations, self-injury, sleep disturbance and suicidal ideas.  The patient is not nervous/anxious and is not hyperactive. /74 (Site: Left Upper Arm)   Pulse 79   Resp 18   Ht 5' 3\" (1.6 m)   Wt 170 lb 9.6 oz (77.4 kg)   SpO2 97%   BMI 30.22 kg/m²   Physical Exam  Vitals and nursing note reviewed. Constitutional:       General: She is not in acute distress. Appearance: Normal appearance. She is well-developed and normal weight. She is not ill-appearing, toxic-appearing or diaphoretic. HENT:      Head: Normocephalic and atraumatic. Right Ear: Tympanic membrane, ear canal and external ear normal. There is no impacted cerumen. Left Ear: Tympanic membrane, ear canal and external ear normal. There is no impacted cerumen. Nose: Nose normal. No congestion or rhinorrhea. Mouth/Throat:      Mouth: Mucous membranes are moist.      Pharynx: Oropharynx is clear. No oropharyngeal exudate or posterior oropharyngeal erythema. Eyes:      General: No scleral icterus. Right eye: No discharge. Left eye: No discharge. Extraocular Movements: Extraocular movements intact. Conjunctiva/sclera: Conjunctivae normal.      Pupils: Pupils are equal, round, and reactive to light. Neck:      Thyroid: No thyromegaly. Vascular: No carotid bruit or JVD. Trachea: No tracheal deviation. Cardiovascular:      Rate and Rhythm: Normal rate and regular rhythm. Pulses: Normal pulses. Heart sounds: Normal heart sounds. No murmur heard. No friction rub. No gallop. Pulmonary:      Effort: Pulmonary effort is normal. No respiratory distress. Breath sounds: Normal breath sounds. No stridor. No wheezing, rhonchi or rales. Chest:      Chest wall: No tenderness. Abdominal:      General: Bowel sounds are normal. There is no distension. Palpations: Abdomen is soft. There is no mass. Tenderness: There is no abdominal tenderness. There is no right CVA tenderness, left CVA tenderness, guarding or rebound.       Hernia: No hernia is present. Musculoskeletal:         General: No swelling, tenderness, deformity or signs of injury. Normal range of motion. Cervical back: Normal range of motion and neck supple. No rigidity. No muscular tenderness. Right lower leg: No edema. Left lower leg: No edema. Lymphadenopathy:      Cervical: No cervical adenopathy. Skin:     General: Skin is warm and dry. Capillary Refill: Capillary refill takes less than 2 seconds. Coloration: Skin is not jaundiced or pale. Findings: No bruising, erythema, lesion or rash. Neurological:      General: No focal deficit present. Mental Status: She is alert and oriented to person, place, and time. Mental status is at baseline. Cranial Nerves: No cranial nerve deficit. Sensory: No sensory deficit. Motor: No weakness or abnormal muscle tone. Coordination: Coordination normal.      Gait: Gait normal.      Deep Tendon Reflexes: Reflexes are normal and symmetric. Reflexes normal.   Psychiatric:         Mood and Affect: Mood normal.         Behavior: Behavior normal.         Thought Content: Thought content normal.         Judgment: Judgment normal.         1. Hypothyroidism, unspecified type    2. B12 deficiency    3. Hashimoto's disease    4. Essential hypertension    5. Anxiety and depression    6. Insomnia, unspecified type    7. Tension-type headache, not intractable, unspecified chronicity pattern    8. Vitamin D deficiency    9. Fluid retention    10. Fibromyalgia    11. Other migraine without status migrainosus, not intractable    12. Gastroesophageal reflux disease without esophagitis        ASSESSMENT/PLAN:    54-year-old woman here for follow-up    1. Fluid retention: Continue Lasix as prescribed    2. Vitamin D deficiency: Continue vitamin D supplementation    3. Fibromyalgia: Stable    4. Insomnia: Continue trazodone as prescribed    5. Migraines: Relatively stabilized at this time.   Continue Maxalt as needed    6. Hypothyroidism/Hashimoto's Disease: Continue levothyroxine at current dose    7. Anxiety and depression: Stable    8. B12 deficiency: B12 injection given today    9. Acid reflux: Doing well with Pepcid    10. Environmental allergies: Continue medications as prescribed      Daija was seen today for 3 month follow-up. Diagnoses and all orders for this visit:    Hypothyroidism, unspecified type    B12 deficiency  -     cyanocobalamin injection 1,000 mcg  -     Vitamin B12; Future  -     T4, Free; Future    Hashimoto's disease  -     levothyroxine (SYNTHROID) 88 MCG tablet; TAKE 1 TABLET BY MOUTH ONE TIME EACH DAY  -     CBC Auto Differential; Future  -     TSH without Reflex; Future    Essential hypertension  -     furosemide (LASIX) 20 MG tablet; Take 1 tablet by mouth daily  -     CBC Auto Differential; Future  -     Comprehensive Metabolic Panel; Future  -     Lipid Panel; Future  -     TSH without Reflex; Future  -     Vitamin D 25 Hydroxy; Future  -     Vitamin B12; Future  -     T4, Free; Future    Anxiety and depression  -     dicyclomine (BENTYL) 20 MG tablet; Take 1 tablet by mouth 2 times daily as needed (abdominal pain)    Insomnia, unspecified type  -     traZODone (DESYREL) 50 MG tablet; Take 1 tablet by mouth nightly    Tension-type headache, not intractable, unspecified chronicity pattern  -     rizatriptan (MAXALT) 10 MG tablet;  Take 1 tablet by mouth once as needed for Migraine May repeat in 2 hours if needed    Vitamin D deficiency  -     vitamin D (ERGOCALCIFEROL) 1.25 MG (58074 UT) CAPS capsule; TAKE 1 CAPSULE BY MOUTH ONE TIME PER WEEK    Fluid retention    Fibromyalgia    Other migraine without status migrainosus, not intractable    Gastroesophageal reflux disease without esophagitis          Return in about 3 months (around 2/19/2022), or physical.     Orders Placed This Encounter   Procedures    CBC Auto Differential     Fast 12 hours     Standing Status:   Future Standing Expiration Date:   11/19/2022    Comprehensive Metabolic Panel     Fasting 12 hours     Standing Status:   Future     Standing Expiration Date:   11/19/2022    Lipid Panel     Standing Status:   Future     Standing Expiration Date:   11/19/2022     Order Specific Question:   Is Patient Fasting?/# of Hours     Answer:   12    TSH without Reflex     Fast 12 hours     Standing Status:   Future     Standing Expiration Date:   11/19/2022    Vitamin D 25 Hydroxy     Standing Status:   Future     Standing Expiration Date:   11/19/2022    Vitamin B12     Standing Status:   Future     Standing Expiration Date:   11/19/2022    T4, Free     Standing Status:   Future     Standing Expiration Date:   11/19/2022       Meredith Gonzales MD

## 2021-11-20 ASSESSMENT — ENCOUNTER SYMPTOMS
ABDOMINAL DISTENTION: 0
SHORTNESS OF BREATH: 0
PHOTOPHOBIA: 0
TROUBLE SWALLOWING: 0
SINUS PRESSURE: 0
CHOKING: 0
BLOOD IN STOOL: 0
SORE THROAT: 0
EYE ITCHING: 0
ABDOMINAL PAIN: 0
EYE REDNESS: 0
ANAL BLEEDING: 0
DIARRHEA: 0
COLOR CHANGE: 0
WHEEZING: 0
VOMITING: 0
CHEST TIGHTNESS: 0
FACIAL SWELLING: 0
EYE DISCHARGE: 0
VOICE CHANGE: 0
RHINORRHEA: 0
EYE PAIN: 0
RECTAL PAIN: 0
COUGH: 0
BACK PAIN: 0
NAUSEA: 0
CONSTIPATION: 0

## 2021-11-24 DIAGNOSIS — E55.9 VITAMIN D DEFICIENCY: ICD-10-CM

## 2021-11-24 RX ORDER — ERGOCALCIFEROL 1.25 MG/1
CAPSULE ORAL
Qty: 4 CAPSULE | Refills: 5 | Status: SHIPPED | OUTPATIENT
Start: 2021-11-24

## 2021-12-21 ENCOUNTER — TELEPHONE (OUTPATIENT)
Dept: INTERNAL MEDICINE | Age: 41
End: 2021-12-21

## 2021-12-21 NOTE — TELEPHONE ENCOUNTER
Just calling to let dr know that they have tried to contact the patient since 11-3-2021. Unable to contact the patient for the referral of fibromyalgia?

## 2022-01-31 RX ORDER — LEVOTHYROXINE SODIUM 0.07 MG/1
TABLET ORAL
Qty: 30 TABLET | Refills: 8 | Status: SHIPPED | OUTPATIENT
Start: 2022-01-31 | End: 2022-02-21 | Stop reason: DRUGHIGH

## 2022-02-14 DIAGNOSIS — I10 ESSENTIAL HYPERTENSION: ICD-10-CM

## 2022-02-14 DIAGNOSIS — E53.8 B12 DEFICIENCY: ICD-10-CM

## 2022-02-14 DIAGNOSIS — E06.3 HASHIMOTO'S DISEASE: ICD-10-CM

## 2022-02-14 LAB
ALBUMIN SERPL-MCNC: 4.4 G/DL (ref 3.5–5.2)
ALP BLD-CCNC: 78 U/L (ref 35–104)
ALT SERPL-CCNC: 16 U/L (ref 5–33)
ANION GAP SERPL CALCULATED.3IONS-SCNC: 16 MMOL/L (ref 7–19)
AST SERPL-CCNC: 12 U/L (ref 5–32)
BASOPHILS ABSOLUTE: 0.1 K/UL (ref 0–0.2)
BASOPHILS RELATIVE PERCENT: 0.5 % (ref 0–1)
BILIRUB SERPL-MCNC: 0.4 MG/DL (ref 0.2–1.2)
BUN BLDV-MCNC: 17 MG/DL (ref 6–20)
CALCIUM SERPL-MCNC: 9.4 MG/DL (ref 8.6–10)
CHLORIDE BLD-SCNC: 99 MMOL/L (ref 98–111)
CHOLESTEROL, TOTAL: 173 MG/DL (ref 160–199)
CO2: 24 MMOL/L (ref 22–29)
CREAT SERPL-MCNC: 0.7 MG/DL (ref 0.5–0.9)
EOSINOPHILS ABSOLUTE: 0.4 K/UL (ref 0–0.6)
EOSINOPHILS RELATIVE PERCENT: 4.6 % (ref 0–5)
GFR AFRICAN AMERICAN: >59
GFR NON-AFRICAN AMERICAN: >60
GLUCOSE BLD-MCNC: 90 MG/DL (ref 74–109)
HCT VFR BLD CALC: 46.9 % (ref 37–47)
HDLC SERPL-MCNC: 47 MG/DL (ref 65–121)
HEMOGLOBIN: 15.1 G/DL (ref 12–16)
IMMATURE GRANULOCYTES #: 0 K/UL
LDL CHOLESTEROL CALCULATED: 112 MG/DL
LYMPHOCYTES ABSOLUTE: 3 K/UL (ref 1.1–4.5)
LYMPHOCYTES RELATIVE PERCENT: 31.8 % (ref 20–40)
MCH RBC QN AUTO: 31.9 PG (ref 27–31)
MCHC RBC AUTO-ENTMCNC: 32.2 G/DL (ref 33–37)
MCV RBC AUTO: 99.2 FL (ref 81–99)
MONOCYTES ABSOLUTE: 0.7 K/UL (ref 0–0.9)
MONOCYTES RELATIVE PERCENT: 7.5 % (ref 0–10)
NEUTROPHILS ABSOLUTE: 5.3 K/UL (ref 1.5–7.5)
NEUTROPHILS RELATIVE PERCENT: 55.4 % (ref 50–65)
PDW BLD-RTO: 13.4 % (ref 11.5–14.5)
PLATELET # BLD: 360 K/UL (ref 130–400)
PMV BLD AUTO: 10 FL (ref 9.4–12.3)
POTASSIUM SERPL-SCNC: 4.1 MMOL/L (ref 3.5–5)
RBC # BLD: 4.73 M/UL (ref 4.2–5.4)
SODIUM BLD-SCNC: 139 MMOL/L (ref 136–145)
T4 FREE: 1.11 NG/DL (ref 0.93–1.7)
TOTAL PROTEIN: 7.3 G/DL (ref 6.6–8.7)
TRIGL SERPL-MCNC: 68 MG/DL (ref 0–149)
TSH SERPL DL<=0.05 MIU/L-ACNC: 2.11 UIU/ML (ref 0.27–4.2)
VITAMIN B-12: 472 PG/ML (ref 211–946)
VITAMIN D 25-HYDROXY: 38.1 NG/ML
WBC # BLD: 9.6 K/UL (ref 4.8–10.8)

## 2022-02-21 ENCOUNTER — OFFICE VISIT (OUTPATIENT)
Dept: INTERNAL MEDICINE | Age: 42
End: 2022-02-21
Payer: MEDICAID

## 2022-02-21 VITALS
HEART RATE: 86 BPM | WEIGHT: 169.2 LBS | OXYGEN SATURATION: 99 % | HEIGHT: 63 IN | DIASTOLIC BLOOD PRESSURE: 66 MMHG | RESPIRATION RATE: 18 BRPM | SYSTOLIC BLOOD PRESSURE: 104 MMHG | BODY MASS INDEX: 29.98 KG/M2

## 2022-02-21 DIAGNOSIS — I10 ESSENTIAL HYPERTENSION: ICD-10-CM

## 2022-02-21 DIAGNOSIS — G47.00 INSOMNIA, UNSPECIFIED TYPE: ICD-10-CM

## 2022-02-21 DIAGNOSIS — E53.8 B12 DEFICIENCY: ICD-10-CM

## 2022-02-21 DIAGNOSIS — E55.9 VITAMIN D DEFICIENCY: ICD-10-CM

## 2022-02-21 DIAGNOSIS — E03.8 HYPOTHYROIDISM DUE TO HASHIMOTO'S THYROIDITIS: ICD-10-CM

## 2022-02-21 DIAGNOSIS — E06.3 HYPOTHYROIDISM DUE TO HASHIMOTO'S THYROIDITIS: ICD-10-CM

## 2022-02-21 DIAGNOSIS — Z91.09 ENVIRONMENTAL ALLERGIES: ICD-10-CM

## 2022-02-21 DIAGNOSIS — G43.809 OTHER MIGRAINE WITHOUT STATUS MIGRAINOSUS, NOT INTRACTABLE: ICD-10-CM

## 2022-02-21 DIAGNOSIS — F32.A ANXIETY AND DEPRESSION: ICD-10-CM

## 2022-02-21 DIAGNOSIS — K21.9 GASTROESOPHAGEAL REFLUX DISEASE WITHOUT ESOPHAGITIS: ICD-10-CM

## 2022-02-21 DIAGNOSIS — F32.89 OTHER DEPRESSION: ICD-10-CM

## 2022-02-21 DIAGNOSIS — F41.9 ANXIETY AND DEPRESSION: ICD-10-CM

## 2022-02-21 DIAGNOSIS — Z00.00 ROUTINE HEALTH MAINTENANCE: Primary | ICD-10-CM

## 2022-02-21 DIAGNOSIS — R10.9 ABDOMINAL CRAMPING: ICD-10-CM

## 2022-02-21 PROCEDURE — 99396 PREV VISIT EST AGE 40-64: CPT | Performed by: INTERNAL MEDICINE

## 2022-02-21 PROCEDURE — 96372 THER/PROPH/DIAG INJ SC/IM: CPT | Performed by: INTERNAL MEDICINE

## 2022-02-21 PROCEDURE — G8484 FLU IMMUNIZE NO ADMIN: HCPCS | Performed by: INTERNAL MEDICINE

## 2022-02-21 RX ORDER — CYANOCOBALAMIN 1000 UG/ML
1000 INJECTION INTRAMUSCULAR; SUBCUTANEOUS ONCE
Status: COMPLETED | OUTPATIENT
Start: 2022-02-21 | End: 2022-02-21

## 2022-02-21 RX ORDER — FAMOTIDINE 20 MG/1
20 TABLET, FILM COATED ORAL 2 TIMES DAILY PRN
Qty: 180 TABLET | Refills: 3 | Status: SHIPPED | OUTPATIENT
Start: 2022-02-21

## 2022-02-21 RX ORDER — FUROSEMIDE 40 MG/1
40 TABLET ORAL DAILY
Qty: 90 TABLET | Refills: 0 | Status: SHIPPED | OUTPATIENT
Start: 2022-02-21 | End: 2022-05-19

## 2022-02-21 RX ADMIN — CYANOCOBALAMIN 1000 MCG: 1000 INJECTION INTRAMUSCULAR; SUBCUTANEOUS at 14:59

## 2022-02-21 NOTE — TELEPHONE ENCOUNTER
Daija called requesting a refill of the below medication which has been pended for you:     Requested Prescriptions     Pending Prescriptions Disp Refills    famotidine (PEPCID) 20 MG tablet [Pharmacy Med Name: FAMOTIDINE 20 MG TABLET] 180 tablet 3     Sig: TAKE 1 TABLET BY MOUTH 2 TIMES DAILY AS NEEDED (REFLUX)       Last Appointment Date: 11/19/2021  Next Appointment Date: 2/21/2022    Allergies   Allergen Reactions    Armodafinil Rash     Thrush and mouth ulcers  Thrush and mouth ulcers

## 2022-02-21 NOTE — PROGRESS NOTES
1000mcg of B12 given in (L) deltoid from office stock. Pt tolerated well. Derrek Kohler 46 #[id number] LOT #[id number] EXP 8/2022.

## 2022-02-21 NOTE — PROGRESS NOTES
Chief Complaint   Patient presents with    Annual Exam       HPI: Gregory bIrahim is a 42 y.o. female is here for her annual physical exam.  She is applying for disability. She is seeing Dr. Lilia Solomon woman for fibromyalgia. She is getting trigger point injections. They help somewhat, but she still has a lot of right neck and shoulder pain. She is seeing Dr. Soo Lee for her history of Hashimoto's thyroiditis. Her thyroid function is currently stable. She is scheduled to see Gissell Ferreira MD March 22 for generalized arthralgias. She still aches and hurts all over. She actually has a notebook with her today in which she has documented her symptoms. She complains of worsening fatigue. She does not sleep well. She still getting migraine headaches. She plans to call Dr. David Dyer as she has not had a Botox injection in quite some time. The injections did help. She had to cancel the last appointment. She states that sometimes, her tongue will swell. However, I do not see any evidence of tongue swelling today. She is not sure if her B12 injections work for her fatigue. Her depression is variable. She has a lot of questions in regards to lab work and imaging studies that were done even 2 to 3 years ago. She has been having access to [employer] and has several questions in regards to various studies. These results were explained to her today. Her acid reflux is well controlled. Her blood pressure is stable. She does complain of lower extremity swelling. We can increase her Lasix to 40 mg p.o. daily. Her vitamin D levels within normal limits. She does take Bentyl as needed for abdominal cramping. She does not sleep well even with trazodone at night. I did advise that she try melatonin. She does need a B12 injection today. We will give this to her. Her allergies are stable on her current medication regimen. She does have a history of mild intermittent asthma, which is stable.   She takes Flexeril for chronic back pain secondary to her fibromyalgia. Her anxiety is fairly well controlled on BuSpar. Cymbalta is also helpful for depression and for fibromyalgia symptoms. Routine screening is as follows:  Eye exam yearly  FluVaccine advised yearly  Pap Smear August 2021  Mammogram 12/30/2021. She does see Dr. Justine Grimaldo for her routine Pap smear    Past Medical History:   Diagnosis Date    Fibromyalgia     Fluid retention     H/O Raynaud's syndrome     Heartburn     Hypothyroidism due to Hashimoto's thyroiditis 9/19/2019    Labial cyst     sebaceous cyst and skin tag    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    Spastic colon     Vitamin D deficiency       Past Surgical History:   Procedure Laterality Date    APPENDECTOMY      CHOLECYSTECTOMY      COLONOSCOPY  2010    OVARY REMOVAL      left    WA LYSIS OF LABIAL ADHESIONS N/A 2/16/2017    INCISION AND DRAINAGE OF SEBACEOUS CYST ON LABIA; SKIN TAG REMOVAL OF PERINEUM  performed by Cornelio Villa MD at  [facility]      TYMPANOSTOMY TUBE PLACEMENT      UPPER GASTROINTESTINAL ENDOSCOPY      VULVA SURGERY N/A 2/16/2017    LABIALPLASTY  performed by [facility]elio Villa MD at [facility] OR      Social History     Socioeconomic History    Marital status:      Spouse name: Not on file    Number of children: Not on file    Years of education: Not on file    Highest education level: Not on file   Occupational History    Not on file   Tobacco Use    Smoking status: Current Every Day Smoker     Packs/day: 0.50     Years: 10.00     Pack years: 5.00     Types: Cigarettes    Smokeless tobacco: Never Used   Substance and Sexual Activity    Alcohol use:  Yes     Alcohol/week: 1.0 standard drink     Types: 1 Shots of liquor per week     Comment: 2 per month    Drug use: No    Sexual activity: Not Currently     Partners: Male   Other Topics Concern    Not on file   Social History Narrative    Not on file     Social Determinants of Health Financial Resource Strain: High Risk    Difficulty of Paying Living Expenses: Hard   Food Insecurity: No Food Insecurity    Worried About Running Out of Food in the Last Year: Never true    Loulou of Food in the Last Year: Never true   Transportation Needs: No Transportation Needs    Lack of Transportation (Medical): No    Lack of Transportation (Non-Medical):  No   Physical Activity:     Days of Exercise per Week: Not on file    Minutes of Exercise per Session: Not on file   Stress:     Feeling of Stress : Not on file   Social Connections:     Frequency of Communication with Friends and Family: Not on file    Frequency of Social Gatherings with Friends and Family: Not on file    Attends Islam Services: Not on file    Active Member of [address] [place] [address] [place] or Organizations: Not on file    Attends Club or Organization Meetings: Not on file    Marital Status: Not on file   Intimate Partner Violence:     Fear of Current or Ex-Partner: Not on file    Emotionally Abused: Not on file    Physically Abused: Not on file    Sexually Abused: Not on file   Housing Stability:     Unable to Pay for Housing in the Last Year: Not on file    Number of [place] in the Last Year: Not on file    Unstable Housing in the Last Year: Not on file      Family History   Problem Relation Age of Onset    Breast Cancer Mother 61    Cancer Maternal Aunt         breast unsure of age   [facility] Cancer Paternal Aunt         breast unsure of age   [facility] Stroke Maternal Grandmother     Stroke Maternal Grandfather     Cancer Paternal Aunt         breast unsure of age        Current Outpatient Medications   Medication Sig Dispense Refill    famotidine (PEPCID) 20 MG tablet TAKE 1 TABLET BY MOUTH 2 TIMES DAILY AS NEEDED (REFLUX) 180 tablet 3    furosemide (LASIX) 40 MG tablet Take 1 tablet by mouth daily 90 tablet 0    vitamin D (ERGOCALCIFEROL) 1.25 MG (37437 UT) CAPS capsule TAKE 1 CAPSULE BY MOUTH ONE TIME PER WEEK 4 capsule 5    dicyclomine (BENTYL) 20 MG tablet Take 1 tablet by mouth 2 times daily as needed (abdominal pain) 60 tablet 5    traZODone (DESYREL) 50 MG tablet Take 1 tablet by mouth nightly 30 tablet 2    levothyroxine (SYNTHROID) 88 MCG tablet TAKE 1 TABLET BY MOUTH ONE TIME EACH DAY 30 tablet 2    levocetirizine (XYZAL) 5 MG tablet TAKE 1 TABLET BY MOUTH EVERY DAY AT NIGHT (Patient taking differently: Take 5 mg by mouth nightly as needed ) 30 tablet 0    VENTOLIN  (90 Base) MCG/ACT inhaler INHALE 2 PUFFS INTO THE LUNGS 4 TIMES DAILY AS NEEDED FOR WHEEZING 18 Inhaler 0    cyclobenzaprine (FLEXERIL) 5 MG tablet Take 5 mg by mouth 1 QAM, 1 noon PRN, and 2 QHS      busPIRone (BUSPAR) 10 MG tablet 10 mg 2 times daily       cyanocobalamin 1000 MCG/ML injection Inject 1 mL into the muscle every 30 days 1 mL 0    azelastine HCl 0.15 % SOLN USE 2 SPRAYS TWICE A DAY AS NEEDED 30 mL 3    DULoxetine (CYMBALTA) 60 MG extended release capsule Take 60 mg by mouth daily      rizatriptan (MAXALT) 10 MG tablet Take 1 tablet by mouth once as needed for Migraine May repeat in 2 hours if needed 30 tablet 2     Current Facility-Administered Medications   Medication Dose Route Frequency Provider Last Rate Last Admin    cyanocobalamin injection 1,000 mcg  1,000 mcg IntraMUSCular Once Stephen Real MD            Patient Active Problem List   Diagnosis    Ovarian cyst    Dyspareunia, female    Labial hypertrophy    Vaginal inclusion cyst    Vulvar skin tag    Thyroid nodule    Hypothyroidism due to Hashimoto's thyroiditis    Migraine without status migrainosus, not intractable    Pain, neck    Photophobia    Fibromyalgia    Nicotine dependence, cigarettes, uncomplicated        Review of Systems   Constitutional: Positive for fatigue. Negative for activity change, appetite change, chills, diaphoresis, fever and unexpected weight change.    HENT: Negative for congestion, ear pain, facial swelling, hearing loss, mouth sores, nosebleeds, postnasal drip, rhinorrhea, sinus pressure, sneezing, sore throat, tinnitus, trouble swallowing and voice change. Eyes: Negative for photophobia, pain, discharge, redness, itching and visual disturbance. Respiratory: Negative for cough, choking, chest tightness, shortness of breath and wheezing. Cardiovascular: Negative for chest pain, palpitations and leg swelling. Gastrointestinal: Negative for abdominal distention, abdominal pain, anal bleeding, blood in stool, constipation, diarrhea, nausea, rectal pain and vomiting. Endocrine: Negative for cold intolerance, heat intolerance, polydipsia, polyphagia and polyuria. Genitourinary: Negative for decreased urine volume, difficulty urinating, dysuria, flank pain, frequency, hematuria and urgency. Musculoskeletal: Positive for myalgias. Negative for arthralgias, back pain, gait problem, joint swelling, neck pain and neck stiffness. Aches and hurts all over from fibromyalgia   Skin: Negative for color change, pallor and rash. Allergic/Immunologic: Negative for environmental allergies and food allergies. Neurological: Positive for headaches. Negative for dizziness, tremors, syncope, weakness, light-headedness and numbness. Hematological: Negative for adenopathy. Does not bruise/bleed easily. Psychiatric/Behavioral: Positive for dysphoric mood and sleep disturbance. Negative for agitation, behavioral problems, confusion, decreased concentration, hallucinations, self-injury and suicidal ideas. The patient is nervous/anxious. The patient is not hyperactive. /66 (Site: Left Upper Arm, Position: Sitting)   Pulse 86   Resp 18   Ht 5' 3\" (1.6 m)   Wt 169 lb 3.2 oz (76.7 kg)   LMP 02/21/2022   SpO2 99%   BMI 29.97 kg/m²   Physical Exam  Vitals and nursing note reviewed. Constitutional:       General: She is not in acute distress. Appearance: Normal appearance. She is well-developed and normal weight.  She is not ill-appearing, toxic-appearing or diaphoretic. HENT:      Head: Normocephalic and atraumatic. Right Ear: Tympanic membrane, ear canal and external ear normal. There is no impacted cerumen. Left Ear: Tympanic membrane, ear canal and external ear normal. There is no impacted cerumen. Nose: Nose normal. No congestion or rhinorrhea. Mouth/Throat:      Mouth: Mucous membranes are moist.      Pharynx: Oropharynx is clear. No oropharyngeal exudate or posterior oropharyngeal erythema. Eyes:      General: No scleral icterus. Right eye: No discharge. Left eye: No discharge. Extraocular Movements: Extraocular movements intact. Conjunctiva/sclera: Conjunctivae normal.      Pupils: Pupils are equal, round, and reactive to light. Neck:      Thyroid: No thyromegaly. Vascular: No carotid bruit or JVD. Trachea: No tracheal deviation. Cardiovascular:      Rate and Rhythm: Normal rate and regular rhythm. Pulses: Normal pulses. Heart sounds: Normal heart sounds. No murmur heard. No friction rub. No gallop. Pulmonary:      Effort: Pulmonary effort is normal. No respiratory distress. Breath sounds: Normal breath sounds. No stridor. No wheezing, rhonchi or rales. Chest:      Chest wall: No tenderness. Abdominal:      General: Bowel sounds are normal. There is no distension. Palpations: Abdomen is soft. There is no mass. Tenderness: There is no abdominal tenderness. There is no right CVA tenderness, left CVA tenderness, guarding or rebound. Hernia: No hernia is present. Musculoskeletal:         General: No swelling, tenderness, deformity or signs of injury. Normal range of motion. Cervical back: Normal range of motion and neck supple. No rigidity. No muscular tenderness. Right lower leg: No edema. Left lower leg: No edema. Lymphadenopathy:      Cervical: No cervical adenopathy.    Skin:     General: Skin is warm and dry.      Capillary Refill: Capillary refill takes less than 2 seconds. Coloration: Skin is not jaundiced or pale. Findings: No bruising, erythema, lesion or rash. Neurological:      General: No focal deficit present. Mental Status: She is alert and oriented to person, place, and time. Mental status is at baseline. Cranial Nerves: No cranial nerve deficit. Sensory: No sensory deficit. Motor: No weakness or abnormal muscle tone. Coordination: Coordination normal.      Gait: Gait normal.      Deep Tendon Reflexes: Reflexes are normal and symmetric. Reflexes normal.   Psychiatric:         Mood and Affect: Mood normal.         Behavior: Behavior normal.         Thought Content: Thought content normal.         Judgment: Judgment normal.         1. Routine health maintenance    2. B12 deficiency    3. Anxiety and depression    4. Essential hypertension    5. Gastroesophageal reflux disease without esophagitis    6. Vitamin D deficiency    7. Abdominal cramping    8. Insomnia, unspecified type    9. Hypothyroidism due to Hashimoto's thyroiditis    10. Environmental allergies    11. Other depression    12. Other migraine without status migrainosus, not intractable        ASSESSMENT/PLAN:    50-year-old woman here for her annual physical exam    1. Health maintenance: Routine screening is per HPI. Labs discussed with patient today    2. Acid reflux: Stable on Pepcid    3. Hypertension: Blood pressure well controlled on Lasix. She does complain of lower extremity swelling. Increase her Lasix to 40 mg p.o. daily. Try to avoid salt in her diet. 4.  Vitamin D deficiency: Continue ergocalciferol as prescribed    5. Abdominal cramping: Bentyl as needed    6. Insomnia: Trazodone as prescribed    7. Hypothyroidism: Continue levothyroxine at current dose    8. Environmental allergies: Stable on Xyzal and Ventolin. She also takes Astelin nasal spray    9.   Back pain/fibromyalgia: Continue Flexeril as prescribed    10. B12 deficiency: Continue B12 supplementation. B12 injection today    11. Depression: Relatively stable on Cymbalta    12. Migraines: Maxalt as needed    Daija was seen today for annual exam.    Diagnoses and all orders for this visit:    Routine health maintenance    B12 deficiency  -     cyanocobalamin injection 1,000 mcg  -     Vitamin B12; Future    Anxiety and depression    Essential hypertension  -     furosemide (LASIX) 40 MG tablet; Take 1 tablet by mouth daily  -     Vitamin B12; Future  -     T4, Free; Future  -     Vitamin B12; Future  -     Vitamin D 25 Hydroxy; Future  -     TSH; Future  -     Lipid Panel; Future  -     Comprehensive Metabolic Panel; Future  -     CBC with Auto Differential; Future    Gastroesophageal reflux disease without esophagitis    Vitamin D deficiency    Abdominal cramping    Insomnia, unspecified type    Hypothyroidism due to Hashimoto's thyroiditis    Environmental allergies    Other depression    Other migraine without status migrainosus, not intractable          Return in about 3 months (around 5/21/2022).      Orders Placed This Encounter   Procedures    Vitamin B12     Standing Status:   Future     Standing Expiration Date:   2/21/2023    T4, Free     Standing Status:   Future     Standing Expiration Date:   2/21/2023    Vitamin B12     Standing Status:   Future     Standing Expiration Date:   2/21/2023    Vitamin D 25 Hydroxy     Standing Status:   Future     Standing Expiration Date:   2/21/2023    TSH     Standing Status:   Future     Standing Expiration Date:   2/21/2023    Lipid Panel     Standing Status:   Future     Standing Expiration Date:   2/21/2023     Order Specific Question:   Is Patient Fasting?/# of Hours     Answer:   12    Comprehensive Metabolic Panel     Standing Status:   Future     Standing Expiration Date:   2/21/2023    CBC with Auto Differential     Standing Status:   Future     Standing Expiration Date:   2/21/2023       Zeyad Colin MD

## 2022-02-22 ASSESSMENT — ENCOUNTER SYMPTOMS
CONSTIPATION: 0
WHEEZING: 0
BACK PAIN: 0
VOMITING: 0
EYE REDNESS: 0
BLOOD IN STOOL: 0
VOICE CHANGE: 0
SORE THROAT: 0
CHEST TIGHTNESS: 0
ABDOMINAL DISTENTION: 0
SINUS PRESSURE: 0
CHOKING: 0
DIARRHEA: 0
ABDOMINAL PAIN: 0
ANAL BLEEDING: 0
RHINORRHEA: 0
FACIAL SWELLING: 0
COUGH: 0
PHOTOPHOBIA: 0
EYE DISCHARGE: 0
TROUBLE SWALLOWING: 0
RECTAL PAIN: 0
NAUSEA: 0
COLOR CHANGE: 0
EYE ITCHING: 0
EYE PAIN: 0
SHORTNESS OF BREATH: 0

## 2022-03-22 ENCOUNTER — HOSPITAL ENCOUNTER (OUTPATIENT)
Dept: GENERAL RADIOLOGY | Age: 42
Discharge: HOME | End: 2022-03-22
Payer: MEDICAID

## 2022-03-22 DIAGNOSIS — M54.51 VERTEBROGENIC LOW BACK PAIN: ICD-10-CM

## 2022-03-22 DIAGNOSIS — M54.2 CERVICALGIA: ICD-10-CM

## 2022-03-22 PROCEDURE — 72110 X-RAY EXAM L-2 SPINE 4/>VWS: CPT

## 2022-03-22 PROCEDURE — 72040 X-RAY EXAM NECK SPINE 2-3 VW: CPT

## 2022-04-25 ENCOUNTER — NURSE ONLY (OUTPATIENT)
Dept: INTERNAL MEDICINE | Age: 42
End: 2022-04-25
Payer: MEDICAID

## 2022-04-25 DIAGNOSIS — E53.8 B12 DEFICIENCY: Primary | ICD-10-CM

## 2022-04-25 PROCEDURE — 96372 THER/PROPH/DIAG INJ SC/IM: CPT | Performed by: INTERNAL MEDICINE

## 2022-04-25 RX ORDER — CYANOCOBALAMIN 1000 UG/ML
1000 INJECTION INTRAMUSCULAR; SUBCUTANEOUS ONCE
Status: COMPLETED | OUTPATIENT
Start: 2022-04-25 | End: 2022-04-25

## 2022-04-25 RX ADMIN — CYANOCOBALAMIN 1000 MCG: 1000 INJECTION INTRAMUSCULAR; SUBCUTANEOUS at 12:09

## 2022-04-25 NOTE — PROGRESS NOTES
1000mcg of B12 given in (L) deltoid from office stock. Pt tolerated well. [facility] #[id number] LOT #[id number] EXP 9/22.

## 2022-04-27 ENCOUNTER — PATIENT MESSAGE (OUTPATIENT)
Dept: INTERNAL MEDICINE | Age: 42
End: 2022-04-27

## 2022-04-27 DIAGNOSIS — G44.209 TENSION-TYPE HEADACHE, NOT INTRACTABLE, UNSPECIFIED CHRONICITY PATTERN: ICD-10-CM

## 2022-04-27 DIAGNOSIS — J34.89 SINUS PRESSURE: Primary | ICD-10-CM

## 2022-04-27 RX ORDER — RIZATRIPTAN BENZOATE 10 MG/1
10 TABLET ORAL
Qty: 30 TABLET | Refills: 2 | Status: SHIPPED | OUTPATIENT
Start: 2022-04-27 | End: 2022-06-06

## 2022-04-27 RX ORDER — AZELASTINE HCL 205.5 UG/1
SPRAY NASAL
Qty: 30 ML | Refills: 3 | Status: SHIPPED | OUTPATIENT
Start: 2022-04-27 | End: 2022-06-06 | Stop reason: SDUPTHER

## 2022-04-27 NOTE — TELEPHONE ENCOUNTER
Leydi Chance called to request a refill on her medication.       Last office visit : 2/21/2022   Next office visit : 5/23/2022     Requested Prescriptions     Pending Prescriptions Disp Refills    azelastine HCl 0.15 % SOLN 30 mL 3     Sig: USE 2 SPRAYS TWICE A DAY AS NEEDED    rizatriptan (MAXALT) 10 MG tablet 30 tablet 2     Sig: Take 1 tablet by mouth once as needed for Migraine May repeat in 2 hours if needed            Sherl Nissen, MA

## 2022-04-27 NOTE — TELEPHONE ENCOUNTER
From: Fifi Hopson  To: Dr. Castillo Fail: 4/27/2022 12:34 PM CDT  Subject: Refill     Dr. Tony Lopez-   I am needing a refill on my nose spray- Azelastine and Maxalt called in to [facility] [address]      Thank you-  Daija

## 2022-05-13 DIAGNOSIS — E06.3 HASHIMOTO'S DISEASE: ICD-10-CM

## 2022-05-13 RX ORDER — LEVOTHYROXINE SODIUM 88 UG/1
TABLET ORAL
Qty: 90 TABLET | Refills: 1 | Status: SHIPPED | OUTPATIENT
Start: 2022-05-13

## 2022-05-19 DIAGNOSIS — I10 ESSENTIAL HYPERTENSION: ICD-10-CM

## 2022-05-19 RX ORDER — FUROSEMIDE 40 MG/1
TABLET ORAL
Qty: 90 TABLET | Refills: 0 | Status: SHIPPED | OUTPATIENT
Start: 2022-05-19

## 2022-05-19 NOTE — TELEPHONE ENCOUNTER
Daija called requesting a refill of the below medication which has been pended for you:     Requested Prescriptions     Pending Prescriptions Disp Refills    furosemide (LASIX) 40 MG tablet [Pharmacy Med Name: FUROSEMIDE 40 MG TABLET] 90 tablet 0     Sig: TAKE 1 TABLET BY MOUTH EVERY DAY       Last Appointment Date: 2/21/2022  Next Appointment Date: 5/23/2022    Allergies   Allergen Reactions    Armodafinil Rash     Thrush and mouth ulcers  Thrush and mouth ulcers

## 2022-06-06 ENCOUNTER — OFFICE VISIT (OUTPATIENT)
Dept: INTERNAL MEDICINE | Age: 42
End: 2022-06-06
Payer: MEDICAID

## 2022-06-06 VITALS
BODY MASS INDEX: 28.07 KG/M2 | DIASTOLIC BLOOD PRESSURE: 72 MMHG | SYSTOLIC BLOOD PRESSURE: 122 MMHG | OXYGEN SATURATION: 99 % | HEART RATE: 70 BPM | HEIGHT: 63 IN | WEIGHT: 158.4 LBS

## 2022-06-06 DIAGNOSIS — M79.89 SWELLING OF LOWER EXTREMITY: ICD-10-CM

## 2022-06-06 DIAGNOSIS — E53.8 B12 DEFICIENCY: ICD-10-CM

## 2022-06-06 DIAGNOSIS — Z91.09 ENVIRONMENTAL ALLERGIES: ICD-10-CM

## 2022-06-06 DIAGNOSIS — G47.00 INSOMNIA, UNSPECIFIED TYPE: ICD-10-CM

## 2022-06-06 DIAGNOSIS — E06.3 HASHIMOTO'S THYROIDITIS: ICD-10-CM

## 2022-06-06 DIAGNOSIS — M79.7 FIBROMYALGIA: Primary | ICD-10-CM

## 2022-06-06 DIAGNOSIS — I10 PRIMARY HYPERTENSION: ICD-10-CM

## 2022-06-06 DIAGNOSIS — E55.9 VITAMIN D DEFICIENCY: ICD-10-CM

## 2022-06-06 DIAGNOSIS — F32.89 OTHER DEPRESSION: ICD-10-CM

## 2022-06-06 DIAGNOSIS — K58.8 OTHER IRRITABLE BOWEL SYNDROME: ICD-10-CM

## 2022-06-06 PROBLEM — I73.00 RAYNAUD'S PHENOMENON: Status: ACTIVE | Noted: 2022-04-07

## 2022-06-06 PROBLEM — R63.5 WEIGHT GAIN: Status: ACTIVE | Noted: 2021-08-03

## 2022-06-06 PROBLEM — R53.83 FATIGUE: Status: ACTIVE | Noted: 2022-04-07

## 2022-06-06 PROBLEM — Q38.2 MACROGLOSSIA: Status: ACTIVE | Noted: 2021-01-12

## 2022-06-06 PROBLEM — E04.1 NONTOXIC SINGLE THYROID NODULE: Status: ACTIVE | Noted: 2021-01-24

## 2022-06-06 PROBLEM — D75.89 MACROCYTOSIS: Status: ACTIVE | Noted: 2020-04-01

## 2022-06-06 PROCEDURE — G8417 CALC BMI ABV UP PARAM F/U: HCPCS | Performed by: INTERNAL MEDICINE

## 2022-06-06 PROCEDURE — 4004F PT TOBACCO SCREEN RCVD TLK: CPT | Performed by: INTERNAL MEDICINE

## 2022-06-06 PROCEDURE — G8427 DOCREV CUR MEDS BY ELIG CLIN: HCPCS | Performed by: INTERNAL MEDICINE

## 2022-06-06 PROCEDURE — 99214 OFFICE O/P EST MOD 30 MIN: CPT | Performed by: INTERNAL MEDICINE

## 2022-06-06 RX ORDER — AZELASTINE HCL 205.5 UG/1
SPRAY NASAL
Qty: 30 ML | Refills: 3 | Status: SHIPPED | OUTPATIENT
Start: 2022-06-06

## 2022-06-06 RX ORDER — TIZANIDINE 4 MG/1
TABLET ORAL
COMMUNITY
Start: 2022-05-27

## 2022-06-06 RX ORDER — CYANOCOBALAMIN 1000 UG/ML
1000 INJECTION INTRAMUSCULAR; SUBCUTANEOUS ONCE
Status: COMPLETED | OUTPATIENT
Start: 2022-06-06 | End: 2022-06-06

## 2022-06-06 RX ORDER — TRAZODONE HYDROCHLORIDE 100 MG/1
100 TABLET ORAL NIGHTLY
Qty: 90 TABLET | Refills: 1 | Status: SHIPPED | OUTPATIENT
Start: 2022-06-06

## 2022-06-06 RX ADMIN — CYANOCOBALAMIN 1000 MCG: 1000 INJECTION INTRAMUSCULAR; SUBCUTANEOUS at 15:44

## 2022-06-06 ASSESSMENT — PATIENT HEALTH QUESTIONNAIRE - PHQ9
3. TROUBLE FALLING OR STAYING ASLEEP: 0
2. FEELING DOWN, DEPRESSED OR HOPELESS: 0
SUM OF ALL RESPONSES TO PHQ QUESTIONS 1-9: 0
SUM OF ALL RESPONSES TO PHQ QUESTIONS 1-9: 0
5. POOR APPETITE OR OVEREATING: 0
SUM OF ALL RESPONSES TO PHQ9 QUESTIONS 1 & 2: 0
7. TROUBLE CONCENTRATING ON THINGS, SUCH AS READING THE NEWSPAPER OR WATCHING TELEVISION: 0
SUM OF ALL RESPONSES TO PHQ QUESTIONS 1-9: 0
4. FEELING TIRED OR HAVING LITTLE ENERGY: 0
6. FEELING BAD ABOUT YOURSELF - OR THAT YOU ARE A FAILURE OR HAVE LET YOURSELF OR YOUR FAMILY DOWN: 0
1. LITTLE INTEREST OR PLEASURE IN DOING THINGS: 0
8. MOVING OR SPEAKING SO SLOWLY THAT OTHER PEOPLE COULD HAVE NOTICED. OR THE OPPOSITE, BEING SO FIGETY OR RESTLESS THAT YOU HAVE BEEN MOVING AROUND A LOT MORE THAN USUAL: 0
9. THOUGHTS THAT YOU WOULD BE BETTER OFF DEAD, OR OF HURTING YOURSELF: 0
SUM OF ALL RESPONSES TO PHQ QUESTIONS 1-9: 0
10. IF YOU CHECKED OFF ANY PROBLEMS, HOW DIFFICULT HAVE THESE PROBLEMS MADE IT FOR YOU TO DO YOUR WORK, TAKE CARE OF THINGS AT HOME, OR GET ALONG WITH OTHER PEOPLE: 0

## 2022-06-06 NOTE — PROGRESS NOTES
After obtaining consent, and per orders of Dr. Sampson Workman, injection of B12 given in Left deltoid by Sherl Nissen, MA. Patient instructed to remain in clinic for 20 minutes afterwards, and to report any adverse reaction to me immediately.

## 2022-06-06 NOTE — PROGRESS NOTES
Chief Complaint   Patient presents with    6 Month Follow-Up       HPI: Daniel Dallas is a 42 y.o. female is here for up of Hashimoto's thyroiditis, environmental allergies, insomnia, migraines, acid reflux and fibromyalgia. Overall, she is doing well. Her blood pressure is well controlled. She still has some difficulty with lower extremity swelling at times. Lasix is helpful for lower extremity swelling. Her headaches only occur about once a month now. Her medications do work well for headache control. She does complain of some insomnia. She wants to know if her trazodone can be increased. We can increase it to 100 mg p.o. daily. Her fibromyalgia is stable. Her allergies are well controlled. Cymbalta and BuSpar helpful for anxiety and depression. She does have a history of vitamin D deficiency. Vitamin D level is currently pending. She does complain of a slight cough, which she thinks is allergy related. She has not had any fever or chills.     Past Medical History:   Diagnosis Date    Fibromyalgia     Fluid retention     H/O Raynaud's syndrome     Heartburn     Hypothyroidism due to Hashimoto's thyroiditis 9/19/2019    Labial cyst     sebaceous cyst and skin tag    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    Spastic colon     Vitamin D deficiency       Past Surgical History:   Procedure Laterality Date    APPENDECTOMY      CHOLECYSTECTOMY      COLONOSCOPY  2010    OVARY REMOVAL      left    [place] LYSIS OF LABIAL ADHESIONS N/A 2/16/2017    INCISION AND DRAINAGE OF SEBACEOUS CYST ON LABIA; SKIN TAG REMOVAL OF PERINEUM  performed by Ella Oropeza MD at [place] [address] TYMPANOSTOMY TUBE PLACEMENT      UPPER GASTROINTESTINAL ENDOSCOPY      VULVA SURGERY N/A 2/16/2017    LABIALPLASTY  performed by Ella Oropeza MD at [place] [id number] History     Socioeconomic History    Marital status:      Spouse name: None    Number of children: None    Years of education: None  Highest education level: None   Occupational History    None   Tobacco Use    Smoking status: Current Every Day Smoker     Packs/day: 0.50     Years: 10.00     Pack years: 5.00     Types: Cigarettes    Smokeless tobacco: Never Used   Substance and Sexual Activity    Alcohol use: Yes     Alcohol/week: 1.0 standard drink     Types: 1 Shots of liquor per week     Comment: 2 per month    Drug use: No    Sexual activity: Not Currently     Partners: Male   Other Topics Concern    None   Social History Narrative    None     Social Determinants of Health     Financial Resource Strain: High Risk    Difficulty of Paying Living Expenses: Hard   Food Insecurity: No Food Insecurity    Worried About Running Out of Food in the Last Year: Never true    [place] of Food in the Last Year: Never true   Transportation Needs: No Transportation Needs    Lack of Transportation (Medical): No    Lack of Transportation (Non-Medical):  No   Physical Activity:     Days of Exercise per Week: Not on file    Minutes of Exercise per Session: Not on file   Stress:     Feeling of Stress : Not on file   Social Connections:     Frequency of Communication with Friends and Family: Not on file    Frequency of Social Gatherings with Friends and Family: Not on file    Attends [employer] Services: Not on file    Active Member of [address] [place] [address] [employer] or Organizations: Not on file    Attends Club or Organization Meetings: Not on file    Marital Status: Not on file   Intimate Partner Violence:     Fear of Current or Ex-Partner: Not on file    Emotionally Abused: Not on file    Physically Abused: Not on file    Sexually Abused: Not on file   Housing Stability:     Unable to Pay for Housing in the Last Year: Not on file    Number of [place] in the Last Year: Not on file    Unstable Housing in the Last Year: Not on file      Family History   Problem Relation Age of Onset    Breast Cancer Mother 61    Cancer Maternal Aunt         breast unsure of age   [facility] Cancer Paternal Aunt         breast unsure of age   [facility] Stroke Maternal Grandmother     Stroke Maternal Grandfather     Cancer Paternal Aunt         breast unsure of age        Current Outpatient Medications   Medication Sig Dispense Refill    tiZANidine (ZANAFLEX) 4 MG tablet TAKE 1-2 TABLETS BY MOUTH EVERY 6-8 HOURS      azelastine HCl 0.15 % SOLN USE 2 SPRAYS TWICE A DAY AS NEEDED 30 mL 3    traZODone (DESYREL) 100 MG tablet Take 1 tablet by mouth nightly 90 tablet 1    furosemide (LASIX) 40 MG tablet TAKE 1 TABLET BY MOUTH EVERY DAY 90 tablet 0    levothyroxine (SYNTHROID) 88 MCG tablet TAKE 1 TABLET BY MOUTH ONCE A DAY 90 tablet 1    rizatriptan (MAXALT) 10 MG tablet Take 1 tablet by mouth once as needed for Migraine May repeat in 2 hours if needed 30 tablet 2    famotidine (PEPCID) 20 MG tablet TAKE 1 TABLET BY MOUTH 2 TIMES DAILY AS NEEDED (REFLUX) 180 tablet 3    vitamin D (ERGOCALCIFEROL) 1.25 MG (03156 UT) CAPS capsule TAKE 1 CAPSULE BY MOUTH ONE TIME PER WEEK 4 capsule 5    dicyclomine (BENTYL) 20 MG tablet Take 1 tablet by mouth 2 times daily as needed (abdominal pain) 60 tablet 5    levocetirizine (XYZAL) 5 MG tablet TAKE 1 TABLET BY MOUTH EVERY DAY AT NIGHT (Patient taking differently: Take 5 mg by mouth nightly as needed ) 30 tablet 0    VENTOLIN  (90 Base) MCG/ACT inhaler INHALE 2 PUFFS INTO THE LUNGS 4 TIMES DAILY AS NEEDED FOR WHEEZING 18 Inhaler 0    busPIRone (BUSPAR) 10 MG tablet 10 mg 2 times daily       cyanocobalamin 1000 MCG/ML injection Inject 1 mL into the muscle every 30 days 1 mL 0    DULoxetine (CYMBALTA) 60 MG extended release capsule Take 60 mg by mouth daily       Current Facility-Administered Medications   Medication Dose Route Frequency Provider Last Rate Last Admin    cyanocobalamin injection 1,000 mcg  1,000 mcg IntraMUSCular Once Dixie Leon MD            Patient Active Problem List   Diagnosis    Cyst of ovary    Pain in female genitalia on intercourse    Hypertrophy of labia    Cyst of vagina    Lesion of vulva    Thyroid nodule    Hypothyroidism due to Hashimoto's thyroiditis    Migraine    Pain, neck    Photophobia    Fibromyalgia    Nicotine dependence, cigarettes, uncomplicated    Fatigue    Hashimoto's thyroiditis    Macrocytosis    Macroglossia    Nontoxic single thyroid nodule    Raynaud's phenomenon    Weight gain        Review of Systems   Constitutional: Positive for fatigue. Negative for activity change, appetite change, chills, diaphoresis, fever and unexpected weight change. HENT: Negative for congestion, ear pain, facial swelling, hearing loss, mouth sores, nosebleeds, postnasal drip, rhinorrhea, sinus pressure, sneezing, sore throat, tinnitus, trouble swallowing and voice change. Eyes: Negative for photophobia, pain, discharge, redness, itching and visual disturbance. Respiratory: Negative for cough, choking, chest tightness, shortness of breath and wheezing. Cardiovascular: Negative for chest pain, palpitations and leg swelling. Gastrointestinal: Negative for abdominal distention, abdominal pain, anal bleeding, blood in stool, constipation, diarrhea, nausea, rectal pain and vomiting. Endocrine: Negative for cold intolerance, heat intolerance, polydipsia, polyphagia and polyuria. Genitourinary: Negative for decreased urine volume, difficulty urinating, dysuria, flank pain, frequency, hematuria and urgency. Musculoskeletal: Positive for myalgias. Negative for arthralgias, back pain, gait problem, joint swelling, neck pain and neck stiffness. Aches and hurts all over from fibromyalgia   Skin: Negative for color change, pallor and rash. Allergic/Immunologic: Negative for environmental allergies and food allergies. Neurological: Positive for headaches. Negative for dizziness, tremors, syncope, weakness, light-headedness and numbness. Hematological: Negative for adenopathy.  Does not bruise/bleed easily. Psychiatric/Behavioral: Positive for dysphoric mood and sleep disturbance. Negative for agitation, behavioral problems, confusion, decreased concentration, hallucinations, self-injury and suicidal ideas. The patient is nervous/anxious. The patient is not hyperactive. /72   Pulse 70   Ht 5' 3\" (1.6 m)   Wt 158 lb 6.4 oz (71.8 kg)   SpO2 99%   BMI 28.06 kg/m²   Physical Exam  Vitals and nursing note reviewed. Constitutional:       General: She is not in acute distress. Appearance: Normal appearance. She is well-developed and normal weight. She is not ill-appearing, toxic-appearing or diaphoretic. HENT:      Head: Normocephalic and atraumatic. Right Ear: Tympanic membrane, ear canal and external ear normal. There is no impacted cerumen. Left Ear: Tympanic membrane, ear canal and external ear normal. There is no impacted cerumen. Nose: Nose normal. No congestion or rhinorrhea. Mouth/Throat:      Mouth: Mucous membranes are moist.      Pharynx: Oropharynx is clear. No oropharyngeal exudate or posterior oropharyngeal erythema. Eyes:      General: No scleral icterus. Right eye: No discharge. Left eye: No discharge. Extraocular Movements: Extraocular movements intact. Conjunctiva/sclera: Conjunctivae normal.      Pupils: Pupils are equal, round, and reactive to light. Neck:      Thyroid: No thyromegaly. Vascular: No carotid bruit or JVD. Trachea: No tracheal deviation. Cardiovascular:      Rate and Rhythm: Normal rate and regular rhythm. Pulses: Normal pulses. Heart sounds: Normal heart sounds. No murmur heard. No friction rub. No gallop. Pulmonary:      Effort: Pulmonary effort is normal. No respiratory distress. Breath sounds: Normal breath sounds. No stridor. No wheezing, rhonchi or rales. Chest:      Chest wall: No tenderness.    Abdominal:      General: Bowel sounds are normal. There is no distension. Palpations: Abdomen is soft. There is no mass. Tenderness: There is no abdominal tenderness. There is no right CVA tenderness, left CVA tenderness, guarding or rebound. Hernia: No hernia is present. Musculoskeletal:         General: No swelling, tenderness, deformity or signs of injury. Normal range of motion. Cervical back: Normal range of motion and neck supple. No rigidity. No muscular tenderness. Right lower leg: No edema. Left lower leg: No edema. Lymphadenopathy:      Cervical: No cervical adenopathy. Skin:     General: Skin is warm and dry. Capillary Refill: Capillary refill takes less than 2 seconds. Coloration: Skin is not jaundiced or pale. Findings: No bruising, erythema, lesion or rash. Neurological:      General: No focal deficit present. Mental Status: She is alert and oriented to person, place, and time. Mental status is at baseline. Cranial Nerves: No cranial nerve deficit. Sensory: No sensory deficit. Motor: No weakness or abnormal muscle tone. Coordination: Coordination normal.      Gait: Gait normal.      Deep Tendon Reflexes: Reflexes are normal and symmetric. Reflexes normal.   Psychiatric:         Mood and Affect: Mood normal.         Behavior: Behavior normal.         Thought Content: Thought content normal.         Judgment: Judgment normal.         1. Fibromyalgia    2. B12 deficiency    3. Environmental allergies    4. Hashimoto's thyroiditis    5. Insomnia, unspecified type    6. Primary hypertension    7. Swelling of lower extremity    8. Vitamin D deficiency    9. Other depression    10. Other irritable bowel syndrome        ASSESSMENT/PLAN:    79-year-old woman here for follow-up    1. Fibromyalgia: Continue Zanaflex as prescribed. Cymbalta is also helpful for fibromyalgia pain. Continue medications as prescribed    2. Environmental allergies: Continue Astelin nasal spray    3.   Insomnia: Continue trazodone. Increase trazodone to 75 to 100 mg p.o. as needed at bedtime. 4.  Hypertension/lower extremity swelling: Continue Lasix as prescribed    5. Hypothyroidism: Her TSH is currently pending    6. B12 deficiency: B12 level to be done with next lab draw. B12 injection today    7. Vitamin D deficiency: Continue ergocalciferol as prescribed    8. Depression: Stable on Cymbalta    9. IBS: Continue Bentyl as prescribed    Daija was seen today for 6 month follow-up. Diagnoses and all orders for this visit:    Fibromyalgia    B12 deficiency  -     cyanocobalamin injection 1,000 mcg  -     Vitamin B12; Future  -     Vitamin B12; Future    Environmental allergies  -     azelastine HCl 0.15 % SOLN; USE 2 SPRAYS TWICE A DAY AS NEEDED    Hashimoto's thyroiditis  -     T4, Free; Future  -     Vitamin D 25 Hydroxy; Future  -     TSH; Future  -     Lipid Panel; Future  -     Comprehensive Metabolic Panel; Future  -     CBC with Auto Differential; Future    Insomnia, unspecified type  -     traZODone (DESYREL) 100 MG tablet; Take 1 tablet by mouth nightly    Primary hypertension    Swelling of lower extremity    Vitamin D deficiency    Other depression    Other irritable bowel syndrome          Return in about 4 months (around 10/6/2022).      Orders Placed This Encounter   Procedures    Vitamin B12     Standing Status:   Future     Standing Expiration Date:   6/6/2023    T4, Free     Standing Status:   Future     Standing Expiration Date:   6/6/2023    Vitamin B12     Standing Status:   Future     Standing Expiration Date:   6/6/2023    Vitamin D 25 Hydroxy     Standing Status:   Future     Standing Expiration Date:   6/6/2023    TSH     Standing Status:   Future     Standing Expiration Date:   6/6/2023    Lipid Panel     Standing Status:   Future     Standing Expiration Date:   6/6/2023     Order Specific Question:   Is Patient Fasting?/# of Hours     Answer:   12    Comprehensive Metabolic Panel Standing Status:   Future     Standing Expiration Date:   6/6/2023    CBC with Auto Differential     Standing Status:   Future     Standing Expiration Date:   6/6/2023       Kyra Durán MD

## 2022-06-07 ASSESSMENT — ENCOUNTER SYMPTOMS
PHOTOPHOBIA: 0
VOMITING: 0
EYE PAIN: 0
EYE REDNESS: 0
WHEEZING: 0
TROUBLE SWALLOWING: 0
SHORTNESS OF BREATH: 0
ABDOMINAL DISTENTION: 0
VOICE CHANGE: 0
NAUSEA: 0
RHINORRHEA: 0
COLOR CHANGE: 0
RECTAL PAIN: 0
EYE DISCHARGE: 0
SINUS PRESSURE: 0
FACIAL SWELLING: 0
CONSTIPATION: 0
BLOOD IN STOOL: 0
SORE THROAT: 0
ABDOMINAL PAIN: 0
CHEST TIGHTNESS: 0
CHOKING: 0
COUGH: 0
ANAL BLEEDING: 0
EYE ITCHING: 0
BACK PAIN: 0
DIARRHEA: 0

## 2022-06-14 DIAGNOSIS — E53.8 B12 DEFICIENCY: ICD-10-CM

## 2022-06-14 DIAGNOSIS — I10 ESSENTIAL HYPERTENSION: ICD-10-CM

## 2022-06-14 LAB
ALBUMIN SERPL-MCNC: 4.1 G/DL (ref 3.5–5.2)
ALP BLD-CCNC: 81 U/L (ref 35–104)
ALT SERPL-CCNC: 14 U/L (ref 5–33)
ANION GAP SERPL CALCULATED.3IONS-SCNC: 11 MMOL/L (ref 7–19)
AST SERPL-CCNC: 15 U/L (ref 5–32)
BASOPHILS ABSOLUTE: 0.1 K/UL (ref 0–0.2)
BASOPHILS RELATIVE PERCENT: 0.6 % (ref 0–1)
BILIRUB SERPL-MCNC: 0.3 MG/DL (ref 0.2–1.2)
BUN BLDV-MCNC: 9 MG/DL (ref 6–20)
CALCIUM SERPL-MCNC: 9.3 MG/DL (ref 8.6–10)
CHLORIDE BLD-SCNC: 105 MMOL/L (ref 98–111)
CHOLESTEROL, TOTAL: 157 MG/DL (ref 160–199)
CO2: 27 MMOL/L (ref 22–29)
CREAT SERPL-MCNC: 0.8 MG/DL (ref 0.5–0.9)
EOSINOPHILS ABSOLUTE: 0.5 K/UL (ref 0–0.6)
EOSINOPHILS RELATIVE PERCENT: 4.8 % (ref 0–5)
GFR AFRICAN AMERICAN: >59
GFR NON-AFRICAN AMERICAN: >60
GLUCOSE BLD-MCNC: 92 MG/DL (ref 74–109)
HCT VFR BLD CALC: 42 % (ref 37–47)
HDLC SERPL-MCNC: 44 MG/DL (ref 65–121)
HEMOGLOBIN: 13.8 G/DL (ref 12–16)
IMMATURE GRANULOCYTES #: 0 K/UL
LDL CHOLESTEROL CALCULATED: 97 MG/DL
LYMPHOCYTES ABSOLUTE: 3 K/UL (ref 1.1–4.5)
LYMPHOCYTES RELATIVE PERCENT: 30.7 % (ref 20–40)
MCH RBC QN AUTO: 33 PG (ref 27–31)
MCHC RBC AUTO-ENTMCNC: 32.9 G/DL (ref 33–37)
MCV RBC AUTO: 100.5 FL (ref 81–99)
MONOCYTES ABSOLUTE: 0.6 K/UL (ref 0–0.9)
MONOCYTES RELATIVE PERCENT: 6.3 % (ref 0–10)
NEUTROPHILS ABSOLUTE: 5.6 K/UL (ref 1.5–7.5)
NEUTROPHILS RELATIVE PERCENT: 57.2 % (ref 50–65)
PDW BLD-RTO: 13.6 % (ref 11.5–14.5)
PLATELET # BLD: 346 K/UL (ref 130–400)
PMV BLD AUTO: 10.4 FL (ref 9.4–12.3)
POTASSIUM SERPL-SCNC: 3.7 MMOL/L (ref 3.5–5)
RBC # BLD: 4.18 M/UL (ref 4.2–5.4)
SODIUM BLD-SCNC: 143 MMOL/L (ref 136–145)
T4 FREE: 1.07 NG/DL (ref 0.93–1.7)
TOTAL PROTEIN: 6.6 G/DL (ref 6.6–8.7)
TRIGL SERPL-MCNC: 79 MG/DL (ref 0–149)
TSH SERPL DL<=0.05 MIU/L-ACNC: 3.41 UIU/ML (ref 0.27–4.2)
VITAMIN B-12: 600 PG/ML (ref 211–946)
VITAMIN D 25-HYDROXY: 61.1 NG/ML
WBC # BLD: 9.7 K/UL (ref 4.8–10.8)

## 2022-06-21 ENCOUNTER — PATIENT MESSAGE (OUTPATIENT)
Dept: INTERNAL MEDICINE | Age: 42
End: 2022-06-21

## 2022-06-21 DIAGNOSIS — Z80.3 FAMILY HISTORY OF BREAST CANCER: Primary | ICD-10-CM

## 2022-06-21 RX ORDER — FUROSEMIDE 20 MG/1
TABLET ORAL
Qty: 30 TABLET | Refills: 2 | Status: SHIPPED | OUTPATIENT
Start: 2022-06-21

## 2022-06-21 NOTE — TELEPHONE ENCOUNTER
From: Peng Paige  To: Dr. Sidra Tineo: 6/21/2022 1:35 PM CDT  Subject: Lasix    Can I now start taking the 40 Lasix in the morning and a 20 Lasix mid afternoon to keep the extra fluid off?

## 2022-07-06 ENCOUNTER — OFFICE VISIT (OUTPATIENT)
Dept: SURGERY | Age: 42
End: 2022-07-06
Payer: MEDICARE

## 2022-07-06 VITALS
HEIGHT: 63 IN | HEART RATE: 69 BPM | WEIGHT: 162 LBS | TEMPERATURE: 97.9 F | BODY MASS INDEX: 28.7 KG/M2 | DIASTOLIC BLOOD PRESSURE: 73 MMHG | SYSTOLIC BLOOD PRESSURE: 102 MMHG

## 2022-07-06 DIAGNOSIS — Z71.83 ENCOUNTER FOR NONPROCREATIVE GENETIC COUNSELING: Primary | ICD-10-CM

## 2022-07-06 DIAGNOSIS — Z80.3 FAMILY HISTORY OF MALIGNANT NEOPLASM OF BREAST: ICD-10-CM

## 2022-07-06 PROCEDURE — 99203 OFFICE O/P NEW LOW 30 MIN: CPT | Performed by: PHYSICIAN ASSISTANT

## 2022-07-06 PROCEDURE — 4004F PT TOBACCO SCREEN RCVD TLK: CPT | Performed by: PHYSICIAN ASSISTANT

## 2022-07-06 PROCEDURE — G8427 DOCREV CUR MEDS BY ELIG CLIN: HCPCS | Performed by: PHYSICIAN ASSISTANT

## 2022-07-06 PROCEDURE — G8417 CALC BMI ABV UP PARAM F/U: HCPCS | Performed by: PHYSICIAN ASSISTANT

## 2022-07-07 ENCOUNTER — OFFICE VISIT (OUTPATIENT)
Age: 42
End: 2022-07-07
Payer: MEDICARE

## 2022-07-07 VITALS
DIASTOLIC BLOOD PRESSURE: 79 MMHG | HEIGHT: 63 IN | TEMPERATURE: 96.8 F | RESPIRATION RATE: 20 BRPM | WEIGHT: 159.8 LBS | SYSTOLIC BLOOD PRESSURE: 118 MMHG | OXYGEN SATURATION: 97 % | BODY MASS INDEX: 28.31 KG/M2 | HEART RATE: 82 BPM

## 2022-07-07 DIAGNOSIS — R09.82 POSTNASAL DRIP: ICD-10-CM

## 2022-07-07 DIAGNOSIS — K08.89 PAIN, DENTAL: ICD-10-CM

## 2022-07-07 DIAGNOSIS — H60.502 ACUTE OTITIS EXTERNA OF LEFT EAR, UNSPECIFIED TYPE: ICD-10-CM

## 2022-07-07 DIAGNOSIS — R05.9 COUGH: Primary | ICD-10-CM

## 2022-07-07 DIAGNOSIS — H66.002 NON-RECURRENT ACUTE SUPPURATIVE OTITIS MEDIA OF LEFT EAR WITHOUT SPONTANEOUS RUPTURE OF TYMPANIC MEMBRANE: ICD-10-CM

## 2022-07-07 PROCEDURE — G8427 DOCREV CUR MEDS BY ELIG CLIN: HCPCS | Performed by: NURSE PRACTITIONER

## 2022-07-07 PROCEDURE — 4130F TOPICAL PREP RX AOE: CPT | Performed by: NURSE PRACTITIONER

## 2022-07-07 PROCEDURE — 4004F PT TOBACCO SCREEN RCVD TLK: CPT | Performed by: NURSE PRACTITIONER

## 2022-07-07 PROCEDURE — G8417 CALC BMI ABV UP PARAM F/U: HCPCS | Performed by: NURSE PRACTITIONER

## 2022-07-07 PROCEDURE — 99213 OFFICE O/P EST LOW 20 MIN: CPT | Performed by: NURSE PRACTITIONER

## 2022-07-07 RX ORDER — OFLOXACIN 3 MG/ML
5 SOLUTION AURICULAR (OTIC) 2 TIMES DAILY
Qty: 3.5 ML | Refills: 0 | Status: SHIPPED | OUTPATIENT
Start: 2022-07-07 | End: 2022-07-14

## 2022-07-07 RX ORDER — AMOXICILLIN AND CLAVULANATE POTASSIUM 875; 125 MG/1; MG/1
1 TABLET, FILM COATED ORAL 2 TIMES DAILY
Qty: 20 TABLET | Refills: 0 | Status: SHIPPED | OUTPATIENT
Start: 2022-07-07 | End: 2022-07-17

## 2022-07-07 RX ORDER — BROMPHENIRAMINE MALEATE, PSEUDOEPHEDRINE HYDROCHLORIDE, AND DEXTROMETHORPHAN HYDROBROMIDE 2; 30; 10 MG/5ML; MG/5ML; MG/5ML
5 SYRUP ORAL 4 TIMES DAILY PRN
Qty: 118 ML | Refills: 0 | Status: SHIPPED | OUTPATIENT
Start: 2022-07-07 | End: 2022-07-12

## 2022-07-07 ASSESSMENT — ENCOUNTER SYMPTOMS
RESPIRATORY NEGATIVE: 1
GASTROINTESTINAL NEGATIVE: 1
ALLERGIC/IMMUNOLOGIC NEGATIVE: 1
EYES NEGATIVE: 1

## 2022-07-07 NOTE — PROGRESS NOTES
Jayna Dunham (:  1980) is a 42 y.o. female,Established patient, here for evaluation of the following chief complaint(s):  Otalgia (left ear ), Dental Pain (left side of mouth ), and Cough    Patient states she has had postnasal drainage for the past 10 days, and developed left ear and maxillar/dental pain for the past several days. She has had some left ear drainage. She has also had a slight cough that is nonproductive and not worsening. She states she usually gets bronchitis yearly. There was a slight wheeze heard on the left. Patient declined chest x-ray. She has had a low-grade fever running in the low 99's. She states she has had left upper maxillary dental pain for the past few days also. Complains of tenderness of the outer left ear and lymph nodes. ASSESSMENT/PLAN:  1. Cough  2. Pain, dental  3. Non-recurrent acute suppurative otitis media of left ear without spontaneous rupture of tympanic membrane  4. Acute otitis externa of left ear, unspecified type  5. Postnasal drip     Orders Placed This Encounter   Medications    ofloxacin (FLOXIN OTIC) 0.3 % otic solution     Sig: Place 5 drops into the left ear 2 times daily for 7 days     Dispense:  3.5 mL     Refill:  0    brompheniramine-pseudoephedrine-DM 2-30-10 MG/5ML syrup     Sig: Take 5 mLs by mouth 4 times daily as needed for Cough     Dispense:  118 mL     Refill:  0    amoxicillin-clavulanate (AUGMENTIN) 875-125 MG per tablet     Sig: Take 1 tablet by mouth 2 times daily for 10 days     Dispense:  20 tablet     Refill:  0        Return if symptoms worsen or fail to improve. Completely finish antibiotics. Antibiotics may decrease the effectiveness of your birth control. Use another form of birth control for at least 2 weeks. May take a probiotic or eat yogurt for GI health while taking antibiotics. Apply heat to help alleviate pain. Tylenol or Motrin for fever or pain as needed.     Avoid getting water or went into ear. May place a cottonball in ear to protect against wind. Follow-up with your dentist as soon as possible. Subjective   SUBJECTIVE/OBJECTIVE:  HPI    Review of Systems   Constitutional: Negative. HENT: Positive for ear discharge and ear pain. Eyes: Negative. Respiratory: Negative. Cardiovascular: Negative. Gastrointestinal: Negative. Endocrine: Negative. Genitourinary: Negative. Musculoskeletal: Negative. Skin: Negative. Allergic/Immunologic: Negative. Neurological: Negative. Hematological: Negative. Psychiatric/Behavioral: Negative. Objective   Physical Exam  Vitals reviewed. HENT:      Right Ear: Tympanic membrane, ear canal and external ear normal.      Left Ear: Drainage and tenderness present. A middle ear effusion is present. Tympanic membrane is bulging. Tympanic membrane is not scarred, perforated, erythematous or retracted. Ears:      Comments: Small amount of drainage in the left canal.  Tragus is tender to touch. Nose:      Right Sinus: No maxillary sinus tenderness or frontal sinus tenderness. Left Sinus: No maxillary sinus tenderness or frontal sinus tenderness. Mouth/Throat:      Lips: Pink. Mouth: Mucous membranes are moist.      Dentition: Dental tenderness and gingival swelling present. Dental abscesses: Nonvisible. Tongue: No lesions. Pharynx: Posterior oropharyngeal erythema (postnasal drainage) present. No oropharyngeal exudate. Comments: Ulcer-like areas present in the buccal mucosa next to left dentition. Cardiovascular:      Rate and Rhythm: Normal rate and regular rhythm. Heart sounds: Normal heart sounds. Pulmonary:      Effort: Pulmonary effort is normal.      Breath sounds: Normal breath sounds. Lymphadenopathy:      Head:      Right side of head: No submandibular or tonsillar adenopathy.       Left side of head: No submandibular (Tender but not enlarged) or tonsillar (Tender but not enlarged) adenopathy. Cervical:      Right cervical: No superficial cervical adenopathy. Left cervical: No superficial cervical adenopathy. Skin:     General: Skin is warm and dry. Neurological:      Mental Status: She is alert. An electronic signature was used to authenticate this note. --CAIO Brooks - CNP     EMR Dragon/translation disclaimer: Much of this encounter note is an electronic transcription/translation of spoken language to printed text. The electronic translation of spoken language may be erroneous, or at times, nonsensical words or phrases may be inadvertently transcribed.   Although I have reviewed the note for such errors, some may still exist.

## 2022-07-07 NOTE — PATIENT INSTRUCTIONS
Completely finish antibiotics. Antibiotics may decrease the effectiveness of your birth control. Use another form of birth control for at least 2 weeks. May take a probiotic or eat yogurt for GI health while taking antibiotics. Apply heat to help alleviate pain. Tylenol or Motrin for fever or pain as needed. Avoid getting water or went into ear. May place a cottonball in ear to protect against wind. Follow-up with your dentist as soon as possible. Patient Education        Swimmer's Ear: Care Instructions  Your Care Instructions     Swimmer's ear (otitis externa) is inflammation or infection of the ear canal. This is the passage that leads from the outer ear to the eardrum. Any water, sand, or other debris that gets into the ear canal and stays there can cause swimmer's ear. Putting cotton swabs or other items in the ear to clean it canalso cause this problem. Swimmer's ear can be very painful. But you can treat the pain and infectionwith medicines. You should feel better in a few days. Follow-up care is a key part of your treatment and safety. Be sure to make and go to all appointments, and call your doctor if you are having problems. It's also a good idea to know your test results and keep alist of the medicines you take. How can you care for yourself at home? Cleaning and care   Use antibiotic drops as your doctor directs.  Do not insert ear drops (other than the antibiotic ear drops) or anything else into the ear unless your doctor has told you to.  Avoid getting water in the ear until the problem clears up. Use cotton lightly coated with petroleum jelly as an earplug. Do not use plastic earplugs.  Use a hair dryer set on low to carefully dry the ear after you shower.  To ease ear pain, hold a warm washcloth against your ear.  Take pain medicines exactly as directed. ? If the doctor gave you a prescription medicine for pain, take it as prescribed.   ? If you are not taking a prescription pain medicine, ask your doctor if you can take an over-the-counter medicine. Inserting ear drops   Warm the drops to body temperature by rolling the container in your hands. Or you can place it in a cup of warm water for a few minutes.  Lie down, with your ear facing up.  Place drops inside the ear. Follow your doctor's instructions (or the directions on the label) for how many drops to use. Gently wiggle the outer ear or pull the ear up and back to help the drops get into the ear.  It's important to keep the liquid in the ear canal for 3 to 5 minutes. When should you call for help? Call your doctor now or seek immediate medical care if:     You have a new or higher fever.      You have new or worse pain, swelling, warmth, or redness around or behind your ear.      You have new or increasing pus or blood draining from your ear. Watch closely for changes in your health, and be sure to contact your doctor if:     You are not getting better after 2 days (48 hours). Where can you learn more? Go to [email or web address]://Plovgh.[email or web address] org and sign in to your YODIL account. Enter C706 in the [facility] box to learn more about \"Swimmer's Ear: Care Instructions. \"     If you do not have an account, please click on the \"Sign Up Now\" link. Current as of: September 8, 2021               Content Version: 13.3  © 0541-7125 Healthwise, Incorporated. Care instructions adapted under license by [facility] ([facility]). If you have questions about a medical condition or this instruction, always ask your healthcare professional. [place] [address] any warranty or liability for your use of this information. Patient Education        Ear Infection (Otitis Media): Care Instructions  Overview     An ear infection may start with a cold and affect the middle ear (otitis media). It can hurt a lot.  Most ear infections clear up on their own in a couple of days and do not need antibiotics. Also, antibiotics do not work against viruses, which may be the cause of your infection. Regular doses ofpain relievers are the best way to reduce your fever and help you feel better. Follow-up care is a key part of your treatment and safety. Be sure to make and go to all appointments, and call your doctor if you are having problems. It's also a good idea to know your test results and keep alist of the medicines you take. How can you care for yourself at home?  Take pain medicines exactly as directed. ? If the doctor gave you a prescription medicine for pain, take it as prescribed. ? If you are not taking a prescription pain medicine, take an over-the-counter medicine, such as acetaminophen (Tylenol), ibuprofen (Advil, Motrin), or naproxen (Aleve). Read and follow all instructions on the label. ? Do not take two or more pain medicines at the same time unless the doctor told you to. Many pain medicines have acetaminophen, which is Tylenol. Too much acetaminophen (Tylenol) can be harmful.  Plan to take a full dose of pain reliever before bedtime. Getting enough sleep will help you get better.  Try a warm, moist washcloth on the ear. It may help relieve pain.  If your doctor prescribed antibiotics, take them as directed. Do not stop taking them just because you feel better. You need to take the full course of antibiotics. When should you call for help? Call your doctor now or seek immediate medical care if:     You have new or increasing ear pain.      You have new or increasing pus or blood draining from your ear.      You have a fever with a stiff neck or a severe headache. Watch closely for changes in your health, and be sure to contact your doctor if:     You have new or worse symptoms.      You are not getting better after taking an antibiotic for 2 days. Where can you learn more? Go to https://chmarinaeb.health-[email or web address] org and sign in to your NatSent account.  Enter X947 in the Search Health Information box to learn more about \"Ear Infection (Otitis Media): Care Instructions. \"     If you do not have an account, please click on the \"Sign Up Now\" link. Current as of: September 8, 2021               Content Version: 13.3  © 2006-2022 Healthwise, Incorporated. Care instructions adapted under license by [facility] ([facility]). If you have questions about a medical condition or this instruction, always ask your healthcare professional. [address] any warranty or liability for your use of this information. Patient Education        Keeping Ears Dry: Care Instructions  Overview  Your doctor wants you to keep water from getting into your ears. You may need to do this because of a ruptured eardrum, an ear infection, or other earproblems. Follow-up care is a key part of your treatment and safety. Be sure to make and go to all appointments, and call your doctor if you are having problems. It's also a good idea to know your test results and keep alist of the medicines you take. How can you care for yourself at home?  Take baths until your doctor says you can take showers again. Avoid getting water in the ear until after the problem clears up. Ask your doctor if you should use earplugs to keep water out of your ears.  Do not swim until your doctor says you can.  If you get water in your ears, turn your head to each side and pull the earlobe in different directions. This will help the water run out. If your ears are still wet, use a hair dryer set on the lowest heat. Hold the dryer several inches from your ear.  Use your medicines exactly as prescribed. Call your doctor if you think you are having a problem with your medicine. Do not put drops in your ears unless your doctor prescribes them. When should you call for help? Watch closely for changes in your health, and be sure to contact your doctor if you have any problems. Where can you learn more?   Go to [email or web address] org and sign in to your [employer] account. Enter D631 in the [employer] box to learn more about \"Keeping Ears Dry: Care Instructions. \"     If you do not have an account, please click on the \"Sign Up Now\" link. Current as of: September 8, 2021               Content Version: 13.3  © 8606-6663 [facility], [facility]. Care instructions adapted under license by [facility] ([facility]). If you have questions about a medical condition or this instruction, always ask your healthcare professional. [place] [address] any warranty or liability for your use of this information.

## 2022-07-07 NOTE — LETTER
[facility]  [address] [place] [address] [address] [id number]  Phone: [phone]  Fax: [phone] [facility], APRN - CNP        July 7, 2022     Patient: Dick Chauhan   YOB: 1980   Date of Visit: 7/7/2022       To Whom It May Concern: It is my medical opinion that Dick Chauhan may return to full duty immediately with no restrictions. If you have any questions or concerns, please don't hesitate to call.     Sincerely,        Lisa Grande, CAIO - CNP

## 2022-07-22 NOTE — PROGRESS NOTES
Subjective:      Patient ID: Ion Spain is a 42 y.o. female. HPI  Ms. Sara Ruvalcaba presents to establish care for her family history of breast cancer as well as questions regarding her mammogram in 12/2021. Her mammogram was noted to have a nodule previously documented as a cyst.  Her family history is documented in the pedigree below.           Ion Spain is a 42 y.o. female with the following history as recorded in [facility]:  Patient Active Problem List    Diagnosis Date Noted    Fatigue 04/07/2022    Raynaud's phenomenon 04/07/2022    Weight gain 08/03/2021    Nontoxic single thyroid nodule 01/24/2021    Hashimoto's thyroiditis 01/12/2021    Macroglossia 01/12/2021    Macrocytosis 04/01/2020    Nicotine dependence, cigarettes, uncomplicated 17/31/1105    Pain, neck 02/18/2020    Photophobia 02/18/2020    Fibromyalgia 02/18/2020    Migraine 12/20/2019    Hypothyroidism due to Hashimoto's thyroiditis 09/19/2019    Thyroid nodule 09/11/2019    Hypertrophy of labia 02/16/2017    Cyst of vagina 02/16/2017    Lesion of vulva 02/16/2017    Cyst of ovary 02/19/2013    Pain in female genitalia on intercourse 02/19/2013     Current Outpatient Medications   Medication Sig Dispense Refill    furosemide (LASIX) 20 MG tablet Take 1 tablet by mouth at night 30 tablet 2    tiZANidine (ZANAFLEX) 4 MG tablet TAKE 1-2 TABLETS BY MOUTH EVERY 6-8 HOURS      azelastine HCl 0.15 % SOLN USE 2 SPRAYS TWICE A DAY AS NEEDED 30 mL 3    traZODone (DESYREL) 100 MG tablet Take 1 tablet by mouth nightly 90 tablet 1    furosemide (LASIX) 40 MG tablet TAKE 1 TABLET BY MOUTH EVERY DAY 90 tablet 0    levothyroxine (SYNTHROID) 88 MCG tablet TAKE 1 TABLET BY MOUTH ONCE A DAY 90 tablet 1    famotidine (PEPCID) 20 MG tablet TAKE 1 TABLET BY MOUTH 2 TIMES DAILY AS NEEDED (REFLUX) 180 tablet 3    vitamin D (ERGOCALCIFEROL) 1.25 MG (97976 UT) CAPS capsule TAKE 1 CAPSULE BY MOUTH ONE TIME PER WEEK 4 capsule 5    dicyclomine (BENTYL) 20 MG tablet Take 1 tablet by mouth 2 times daily as needed (abdominal pain) 60 tablet 5    levocetirizine (XYZAL) 5 MG tablet TAKE 1 TABLET BY MOUTH EVERY DAY AT NIGHT (Patient taking differently: Take 5 mg by mouth nightly as needed ) 30 tablet 0    VENTOLIN  (90 Base) MCG/ACT inhaler INHALE 2 PUFFS INTO THE LUNGS 4 TIMES DAILY AS NEEDED FOR WHEEZING 18 Inhaler 0    busPIRone (BUSPAR) 10 MG tablet 10 mg 2 times daily       cyanocobalamin 1000 MCG/ML injection Inject 1 mL into the muscle every 30 days 1 mL 0    DULoxetine (CYMBALTA) 60 MG extended release capsule Take 60 mg by mouth daily      rizatriptan (MAXALT) 10 MG tablet Take 1 tablet by mouth once as needed for Migraine May repeat in 2 hours if needed 30 tablet 2     Current Facility-Administered Medications   Medication Dose Route Frequency Provider Last Rate Last Admin    cyanocobalamin injection 1,000 mcg  1,000 mcg IntraMUSCular Once Alea Julian MD         Allergies: Armodafinil  Past Medical History:   Diagnosis Date    Fibromyalgia     Fluid retention     H/O Raynaud's syndrome     Heartburn     Hypothyroidism due to Hashimoto's thyroiditis 9/19/2019    Labial cyst     sebaceous cyst and skin tag    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    Spastic colon     Vitamin D deficiency      Past Surgical History:   Procedure Laterality Date    APPENDECTOMY      CHOLECYSTECTOMY      COLONOSCOPY  2010    OVARY REMOVAL      left    [place] LYSIS OF LABIAL ADHESIONS N/A 2/16/2017    INCISION AND DRAINAGE OF SEBACEOUS CYST ON LABIA; SKIN TAG REMOVAL OF PERINEUM  performed by Shelly Bey MD at [address] N/A 2/16/2017    LABIALPLASTY  performed by Shelly Bey MD at [facility] OR     Family History   Problem Relation Age of Onset    Breast Cancer Mother 61    Stroke Maternal Grandmother     Stroke Maternal Grandfather     Breast Cancer Maternal Aunt         breast unsure of age Breast Cancer Paternal Aunt         breast unsure of age    Breast Cancer Paternal Aunt         breast unsure of age    Breast Cancer Maternal Cousin 48     Social History     Tobacco Use    Smoking status: Every Day     Packs/day: 0.50     Years: 10.00     Pack years: 5.00     Types: Cigarettes    Smokeless tobacco: Never   Substance Use Topics    Alcohol use: Yes     Alcohol/week: 1.0 standard drink     Types: 1 Shots of liquor per week     Comment: 2 per month       Review of Systems   All other systems reviewed and are negative. Objective:   Physical Exam  Chest:   Breasts:     Right: Normal.      Left: Normal.   Skin:     General: Skin is warm and dry. Neurological:      General: No focal deficit present. Mental Status: She is oriented to person, place, and time. Psychiatric:         Mood and Affect: Mood normal.         Behavior: Behavior normal.         Thought Content: Thought content normal.         Judgment: Judgment normal.       Assessment:      Family history of breast cancer      Plan:      I reassured her that her mammogram report was normal.  We discussed genetic testing and she would like to proceed. I will call her to discuss results and further recommendations.           Michael Moe PA-C

## 2022-07-22 NOTE — PROGRESS NOTES
Spoke with pt and went over genetic testing results. They were negative for pathologic mutation. Wilberto Herrera risk assessment was done. I will see her after her next screening mammogram and then plan to see her annually after that.

## 2022-08-11 ENCOUNTER — OFFICE VISIT (OUTPATIENT)
Age: 42
End: 2022-08-11
Payer: MEDICARE

## 2022-08-11 VITALS
WEIGHT: 163 LBS | OXYGEN SATURATION: 99 % | SYSTOLIC BLOOD PRESSURE: 120 MMHG | RESPIRATION RATE: 18 BRPM | DIASTOLIC BLOOD PRESSURE: 62 MMHG | HEART RATE: 70 BPM | BODY MASS INDEX: 28.87 KG/M2 | TEMPERATURE: 98.6 F

## 2022-08-11 DIAGNOSIS — T63.461A WASP STING, ACCIDENTAL OR UNINTENTIONAL, INITIAL ENCOUNTER: Primary | ICD-10-CM

## 2022-08-11 PROCEDURE — G8417 CALC BMI ABV UP PARAM F/U: HCPCS

## 2022-08-11 PROCEDURE — G8428 CUR MEDS NOT DOCUMENT: HCPCS

## 2022-08-11 PROCEDURE — 96372 THER/PROPH/DIAG INJ SC/IM: CPT

## 2022-08-11 PROCEDURE — 4004F PT TOBACCO SCREEN RCVD TLK: CPT

## 2022-08-11 PROCEDURE — 99213 OFFICE O/P EST LOW 20 MIN: CPT

## 2022-08-11 RX ORDER — DEXAMETHASONE SODIUM PHOSPHATE 10 MG/ML
10 INJECTION INTRAMUSCULAR; INTRAVENOUS ONCE
Status: COMPLETED | OUTPATIENT
Start: 2022-08-11 | End: 2022-08-11

## 2022-08-11 RX ADMIN — DEXAMETHASONE SODIUM PHOSPHATE 10 MG: 10 INJECTION INTRAMUSCULAR; INTRAVENOUS at 14:57

## 2022-08-11 ASSESSMENT — ENCOUNTER SYMPTOMS
SHORTNESS OF BREATH: 0
FACIAL SWELLING: 0

## 2022-08-11 NOTE — PROGRESS NOTES
[address]  [address] [address] [place] [address]  Dept: [phone]  Dept Fax: [phone]  Loc: [phone]    Jayshree Davidson is a 36 y.o. female who presents today for her medical conditions/complaints as noted below. Jayshree Davidson is c/o of Insect Bite (Wasp sting left arm)        HPI:     HEBER Choe presents with complaints of wasp sting left arm with continued swelling since yesterday. OTC treatment includes topical cream and ice. Denies recent steroids. Past Medical History:   Diagnosis Date    Fibromyalgia     Fluid retention     H/O Raynaud's syndrome     Heartburn     Hypothyroidism due to Hashimoto's thyroiditis 9/19/2019    Labial cyst     sebaceous cyst and skin tag    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    Spastic colon     Vitamin D deficiency      Past Surgical History:   Procedure Laterality Date    APPENDECTOMY      CHOLECYSTECTOMY      COLONOSCOPY  2010    OVARY REMOVAL      left    [place] LYSIS OF LABIAL ADHESIONS N/A 2/16/2017    INCISION AND DRAINAGE OF SEBACEOUS CYST ON LABIA; SKIN TAG REMOVAL OF PERINEUM  performed by Shelly Bey MD at [address] [place] [address]      TYMPANOSTOMY TUBE PLACEMENT      UPPER GASTROINTESTINAL ENDOSCOPY      VULVA SURGERY N/A 2/16/2017    LABIALPLASTY  performed by Shelly Bey MD at [facility] OR       Family History   Problem Relation Age of Onset    Breast Cancer Mother 61    Stroke Maternal Grandmother     Stroke Maternal Grandfather     Breast Cancer Maternal Aunt         breast unsure of age    Breast Cancer Paternal Aunt         breast unsure of age    Breast Cancer Paternal Aunt         breast unsure of age    Breast Cancer Maternal Cousin 48       Social History     Tobacco Use    Smoking status: Every Day     Packs/day: 0.50     Years: 10.00     Pack years: 5.00     Types: Cigarettes    Smokeless tobacco: Never   Substance Use Topics    Alcohol use:  Yes     Alcohol/week: 1.0 standard drink Types: 1 Shots of liquor per week     Comment: 2 per month      Current Outpatient Medications   Medication Sig Dispense Refill    furosemide (LASIX) 20 MG tablet Take 1 tablet by mouth at night 30 tablet 2    tiZANidine (ZANAFLEX) 4 MG tablet TAKE 1-2 TABLETS BY MOUTH EVERY 6-8 HOURS      azelastine HCl 0.15 % SOLN USE 2 SPRAYS TWICE A DAY AS NEEDED 30 mL 3    traZODone (DESYREL) 100 MG tablet Take 1 tablet by mouth nightly 90 tablet 1    furosemide (LASIX) 40 MG tablet TAKE 1 TABLET BY MOUTH EVERY DAY 90 tablet 0    levothyroxine (SYNTHROID) 88 MCG tablet TAKE 1 TABLET BY MOUTH ONCE A DAY 90 tablet 1    famotidine (PEPCID) 20 MG tablet TAKE 1 TABLET BY MOUTH 2 TIMES DAILY AS NEEDED (REFLUX) 180 tablet 3    vitamin D (ERGOCALCIFEROL) 1.25 MG (57943 UT) CAPS capsule TAKE 1 CAPSULE BY MOUTH ONE TIME PER WEEK 4 capsule 5    dicyclomine (BENTYL) 20 MG tablet Take 1 tablet by mouth 2 times daily as needed (abdominal pain) 60 tablet 5    levocetirizine (XYZAL) 5 MG tablet TAKE 1 TABLET BY MOUTH EVERY DAY AT NIGHT (Patient taking differently: Take 5 mg by mouth nightly as needed) 30 tablet 0    VENTOLIN  (90 Base) MCG/ACT inhaler INHALE 2 PUFFS INTO THE LUNGS 4 TIMES DAILY AS NEEDED FOR WHEEZING 18 Inhaler 0    busPIRone (BUSPAR) 10 MG tablet 10 mg 2 times daily       cyanocobalamin 1000 MCG/ML injection Inject 1 mL into the muscle every 30 days 1 mL 0    DULoxetine (CYMBALTA) 60 MG extended release capsule Take 60 mg by mouth daily      rizatriptan (MAXALT) 10 MG tablet Take 1 tablet by mouth once as needed for Migraine May repeat in 2 hours if needed 30 tablet 2     Current Facility-Administered Medications   Medication Dose Route Frequency Provider Last Rate Last Admin    cyanocobalamin injection 1,000 mcg  1,000 mcg IntraMUSCular Once Shelley Dean MD         No Known Allergies    Health Maintenance   Topic Date Due    COVID-19 Vaccine (1) Never done    Pneumococcal 0-64 years Vaccine (1 - PCV) Never done Breast cancer screen  09/04/2020    Flu vaccine (1) 09/01/2022    Depression Monitoring  06/06/2023    Cervical cancer screen  08/20/2026    Lipids  06/14/2027    DTaP/Tdap/Td vaccine (2 - Td or Tdap) 06/29/2028    Hepatitis C screen  Completed    HIV screen  Completed    Hepatitis A vaccine  Aged Out    Hepatitis B vaccine  Aged Out    Hib vaccine  Aged Out    Meningococcal (ACWY) vaccine  Aged Out       Subjective:     Review of Systems   HENT:  Negative for facial swelling. Respiratory:  Negative for shortness of breath. Musculoskeletal:  Positive for joint swelling. Skin:  Positive for wound.     :Objective      Physical Exam  Constitutional:       General: She is not in acute distress. Appearance: Normal appearance. She is not toxic-appearing. HENT:      Head: Normocephalic and atraumatic. Right Ear: External ear normal.      Left Ear: External ear normal.      Nose: Nose normal.      Mouth/Throat:      Mouth: Mucous membranes are moist.   Eyes:      General:         Right eye: No discharge. Left eye: No discharge. Conjunctiva/sclera: Conjunctivae normal.   Cardiovascular:      Rate and Rhythm: Normal rate and regular rhythm. Pulmonary:      Effort: Pulmonary effort is normal. No respiratory distress. Abdominal:      General: Abdomen is flat. Palpations: Abdomen is soft. Musculoskeletal:         General: Normal range of motion. Hands:       Cervical back: Normal range of motion. Lymphadenopathy:      Cervical: No cervical adenopathy. Skin:     General: Skin is warm and dry. Capillary Refill: Capillary refill takes less than 2 seconds. Findings: No rash. Neurological:      General: No focal deficit present. Mental Status: She is alert.    Psychiatric:         Mood and Affect: Mood normal.     /62   Pulse 70   Temp 98.6 °F (37 °C) (Temporal)   Resp 18   Wt 163 lb (73.9 kg)   SpO2 99%   BMI 28.87 kg/m²     :Assessment Diagnosis Orders   1. Wasp sting, accidental or unintentional, initial encounter  dexamethasone (DECADRON) injection 10 mg          :Plan    No orders of the defined types were placed in this encounter. Plan for supportive care - pt requests steroid shot. Will give 10 of dex. Return precautions and home care education completed. Patient verbalized understanding. No follow-ups on file. Orders Placed This Encounter   Medications    dexamethasone (DECADRON) injection 10 mg       Patient given educational materials- see patient instructions. Discussed use, benefit, and side effects of prescribed medications. All patient questions answered. Pt voiced understanding.      Patient Instructions   Zyrtec every morning, benadryl at bedtime - 5-7 days  Steroid shot in office - avoid them for 3 months due to long term effects  Ice and elevate  Motrin as needed for pain or swelling      Electronically signed by CAIO Thibodeaux CNP on 8/11/2022 at 3:06 PM

## 2022-08-11 NOTE — PATIENT INSTRUCTIONS
Zyrtec every morning, benadryl at bedtime - 5-7 days  Steroid shot in office - avoid them for 3 months due to long term effects  Ice and elevate  Motrin as needed for pain or swelling

## 2022-08-17 LAB
ALBUMIN SERPL-MCNC: 4.6 G/DL (ref 3.5–5.2)
ALP BLD-CCNC: 86 U/L (ref 35–104)
ALT SERPL-CCNC: 18 U/L (ref 5–33)
ANION GAP SERPL CALCULATED.3IONS-SCNC: 10 MMOL/L (ref 7–19)
AST SERPL-CCNC: 17 U/L (ref 5–32)
BACTERIA: NEGATIVE /HPF
BASOPHILS ABSOLUTE: 0.1 K/UL (ref 0–0.2)
BASOPHILS RELATIVE PERCENT: 0.4 % (ref 0–1)
BILIRUB SERPL-MCNC: 0.3 MG/DL (ref 0.2–1.2)
BILIRUBIN URINE: NEGATIVE
BLOOD, URINE: NEGATIVE
BUN BLDV-MCNC: 7 MG/DL (ref 6–20)
CALCIUM SERPL-MCNC: 9.3 MG/DL (ref 8.6–10)
CHLORIDE BLD-SCNC: 98 MMOL/L (ref 98–111)
CHOLESTEROL, TOTAL: 200 MG/DL (ref 160–199)
CLARITY: CLEAR
CO2: 28 MMOL/L (ref 22–29)
COLOR: YELLOW
CREAT SERPL-MCNC: 0.7 MG/DL (ref 0.5–0.9)
CRYSTALS, UA: ABNORMAL /HPF
EOSINOPHILS ABSOLUTE: 0.5 K/UL (ref 0–0.6)
EOSINOPHILS RELATIVE PERCENT: 4.4 % (ref 0–5)
EPITHELIAL CELLS, UA: 1 /HPF (ref 0–5)
GFR AFRICAN AMERICAN: >59
GFR NON-AFRICAN AMERICAN: >60
GLUCOSE BLD-MCNC: 95 MG/DL (ref 74–109)
GLUCOSE URINE: NEGATIVE MG/DL
HCT VFR BLD CALC: 46.7 % (ref 37–47)
HDLC SERPL-MCNC: 53 MG/DL (ref 65–121)
HEMOGLOBIN: 15.1 G/DL (ref 12–16)
HYALINE CASTS: 0 /HPF (ref 0–8)
IMMATURE GRANULOCYTES #: 0 K/UL
KETONES, URINE: NEGATIVE MG/DL
LDL CHOLESTEROL CALCULATED: 120 MG/DL
LEUKOCYTE ESTERASE, URINE: ABNORMAL
LYMPHOCYTES ABSOLUTE: 3.1 K/UL (ref 1.1–4.5)
LYMPHOCYTES RELATIVE PERCENT: 28 % (ref 20–40)
MCH RBC QN AUTO: 32.5 PG (ref 27–31)
MCHC RBC AUTO-ENTMCNC: 32.3 G/DL (ref 33–37)
MCV RBC AUTO: 100.4 FL (ref 81–99)
MONOCYTES ABSOLUTE: 0.8 K/UL (ref 0–0.9)
MONOCYTES RELATIVE PERCENT: 7.2 % (ref 0–10)
NEUTROPHILS ABSOLUTE: 6.7 K/UL (ref 1.5–7.5)
NEUTROPHILS RELATIVE PERCENT: 59.6 % (ref 50–65)
NITRITE, URINE: NEGATIVE
PDW BLD-RTO: 14 % (ref 11.5–14.5)
PH UA: 6 (ref 5–8)
PLATELET # BLD: 382 K/UL (ref 130–400)
PMV BLD AUTO: 9.8 FL (ref 9.4–12.3)
POTASSIUM SERPL-SCNC: 4.2 MMOL/L (ref 3.5–5)
PROTEIN UA: NEGATIVE MG/DL
RBC # BLD: 4.65 M/UL (ref 4.2–5.4)
RBC UA: 1 /HPF (ref 0–4)
SODIUM BLD-SCNC: 136 MMOL/L (ref 136–145)
SPECIFIC GRAVITY UA: 1.01 (ref 1–1.03)
TOTAL PROTEIN: 7.5 G/DL (ref 6.6–8.7)
TRIGL SERPL-MCNC: 134 MG/DL (ref 0–149)
TSH SERPL DL<=0.05 MIU/L-ACNC: 3.67 UIU/ML (ref 0.27–4.2)
UROBILINOGEN, URINE: 0.2 E.U./DL
VITAMIN D 25-HYDROXY: 54.6 NG/ML
WBC # BLD: 11.2 K/UL (ref 4.8–10.8)
WBC UA: 5 /HPF (ref 0–5)

## 2022-08-22 DIAGNOSIS — I10 ESSENTIAL HYPERTENSION: ICD-10-CM

## 2022-08-22 RX ORDER — FUROSEMIDE 40 MG/1
TABLET ORAL
Qty: 90 TABLET | Refills: 0 | OUTPATIENT
Start: 2022-08-22

## 2022-11-09 ENCOUNTER — OFFICE VISIT (OUTPATIENT)
Dept: OBGYN CLINIC | Age: 42
End: 2022-11-09
Payer: MEDICARE

## 2022-11-09 VITALS
HEIGHT: 63 IN | DIASTOLIC BLOOD PRESSURE: 78 MMHG | WEIGHT: 169 LBS | SYSTOLIC BLOOD PRESSURE: 118 MMHG | BODY MASS INDEX: 29.95 KG/M2 | HEART RATE: 67 BPM

## 2022-11-09 DIAGNOSIS — Z80.3 FAMILY HISTORY OF BREAST CANCER: ICD-10-CM

## 2022-11-09 DIAGNOSIS — Z13.79 GENETIC TESTING OF FEMALE: ICD-10-CM

## 2022-11-09 DIAGNOSIS — Z01.419 ENCOUNTER FOR ROUTINE GYNECOLOGIC EXAMINATION IN MEDICARE PATIENT: ICD-10-CM

## 2022-11-09 DIAGNOSIS — N63.11 BREAST LUMP ON RIGHT SIDE AT 10 O'CLOCK POSITION: Primary | ICD-10-CM

## 2022-11-09 DIAGNOSIS — Z12.31 SCREENING MAMMOGRAM, ENCOUNTER FOR: ICD-10-CM

## 2022-11-09 DIAGNOSIS — Z11.3 SCREEN FOR STD (SEXUALLY TRANSMITTED DISEASE): ICD-10-CM

## 2022-11-09 PROCEDURE — G8417 CALC BMI ABV UP PARAM F/U: HCPCS | Performed by: NURSE PRACTITIONER

## 2022-11-09 PROCEDURE — G0101 CA SCREEN;PELVIC/BREAST EXAM: HCPCS | Performed by: NURSE PRACTITIONER

## 2022-11-09 PROCEDURE — 4004F PT TOBACCO SCREEN RCVD TLK: CPT | Performed by: NURSE PRACTITIONER

## 2022-11-09 PROCEDURE — 99214 OFFICE O/P EST MOD 30 MIN: CPT | Performed by: NURSE PRACTITIONER

## 2022-11-09 PROCEDURE — G8484 FLU IMMUNIZE NO ADMIN: HCPCS | Performed by: NURSE PRACTITIONER

## 2022-11-09 PROCEDURE — G8427 DOCREV CUR MEDS BY ELIG CLIN: HCPCS | Performed by: NURSE PRACTITIONER

## 2022-11-09 RX ORDER — HYDROXYCHLOROQUINE SULFATE 200 MG/1
TABLET, FILM COATED ORAL
COMMUNITY

## 2022-11-09 ASSESSMENT — ENCOUNTER SYMPTOMS
GASTROINTESTINAL NEGATIVE: 1
RESPIRATORY NEGATIVE: 1
EYES NEGATIVE: 1
ALLERGIC/IMMUNOLOGIC NEGATIVE: 1

## 2022-11-09 NOTE — PROGRESS NOTES
Chester Reese is a 36 y.o. female who presents today for her medical conditions/ complaints as noted below. Chester Reese is c/o of Annual Exam        HPI  Pt presents today for pelvic and breast exam.  Wants std screen  Has several members of family with breast cancer. She had declined genetic testing in 2019-documented. Today is agreeable. States they have seen something like a cyst on her mammograms in the past.     Last mammogram:  - [facility]  Last pap smear:  2021  Contraception:    :  3  Para:  3  AB:  0  Last bone density:  never  Last colonoscopy:;  never  Patient's last menstrual period was 10/20/2022 (exact date).   [id number]    Past Medical History:   Diagnosis Date    Fibromyalgia     Fluid retention     H/O Raynaud's syndrome     Heartburn     Hypothyroidism due to Hashimoto's thyroiditis 2019    Labial cyst     sebaceous cyst and skin tag    Ophthalmoplegic migraine 2019    Spastic colon     Vitamin D deficiency      Past Surgical History:   Procedure Laterality Date    APPENDECTOMY      CHOLECYSTECTOMY      COLONOSCOPY      OVARY REMOVAL      left    [place] LYSIS OF LABIAL ADHESIONS N/A 2017    INCISION AND DRAINAGE OF SEBACEOUS CYST ON LABIA; SKIN TAG REMOVAL OF PERINEUM  performed by García Gore MD at [address] [place] [address]      TYMPANOSTOMY TUBE PLACEMENT      UPPER GASTROINTESTINAL ENDOSCOPY      VULVA SURGERY N/A 2017    LABIALPLASTY  performed by García Gore MD at [facility]     Family History   Problem Relation Age of Onset    Breast Cancer Mother 61    Stroke Maternal Grandmother     Stroke Maternal Grandfather     Breast Cancer Maternal Aunt         breast unsure of age    Breast Cancer Paternal Aunt         breast unsure of age    Breast Cancer Paternal Aunt         breast unsure of age    Breast Cancer Maternal Cousin 48     Social History     Tobacco Use    Smoking status: Every Day     Packs/day: 0.50     Years: 10.00     Pack years: 5.00     Types: Cigarettes    Smokeless tobacco: Never   Substance Use Topics    Alcohol use:  Yes     Alcohol/week: 1.0 standard drink     Types: 1 Shots of liquor per week     Comment: 2 per month       Current Outpatient Medications   Medication Sig Dispense Refill    hydroxychloroquine (PLAQUENIL) 200 MG tablet hydroxychloroquine 200 mg tablet   TAKE 1 TABLET BY MOUTH EVERY DAY      furosemide (LASIX) 20 MG tablet Take 1 tablet by mouth at night 30 tablet 2    tiZANidine (ZANAFLEX) 4 MG tablet TAKE 1-2 TABLETS BY MOUTH EVERY 6-8 HOURS      azelastine HCl 0.15 % SOLN USE 2 SPRAYS TWICE A DAY AS NEEDED 30 mL 3    traZODone (DESYREL) 100 MG tablet Take 1 tablet by mouth nightly 90 tablet 1    furosemide (LASIX) 40 MG tablet TAKE 1 TABLET BY MOUTH EVERY DAY 90 tablet 0    levothyroxine (SYNTHROID) 88 MCG tablet TAKE 1 TABLET BY MOUTH ONCE A DAY 90 tablet 1    rizatriptan (MAXALT) 10 MG tablet Take 1 tablet by mouth once as needed for Migraine May repeat in 2 hours if needed 30 tablet 2    famotidine (PEPCID) 20 MG tablet TAKE 1 TABLET BY MOUTH 2 TIMES DAILY AS NEEDED (REFLUX) 180 tablet 3    vitamin D (ERGOCALCIFEROL) 1.25 MG (65267 UT) CAPS capsule TAKE 1 CAPSULE BY MOUTH ONE TIME PER WEEK 4 capsule 5    dicyclomine (BENTYL) 20 MG tablet Take 1 tablet by mouth 2 times daily as needed (abdominal pain) 60 tablet 5    VENTOLIN  (90 Base) MCG/ACT inhaler INHALE 2 PUFFS INTO THE LUNGS 4 TIMES DAILY AS NEEDED FOR WHEEZING 18 Inhaler 0    busPIRone (BUSPAR) 10 MG tablet 10 mg 2 times daily       cyanocobalamin 1000 MCG/ML injection Inject 1 mL into the muscle every 30 days 1 mL 0    DULoxetine (CYMBALTA) 60 MG extended release capsule Take 60 mg by mouth daily       Current Facility-Administered Medications   Medication Dose Route Frequency Provider Last Rate Last Admin    cyanocobalamin injection 1,000 mcg  1,000 mcg IntraMUSCular Once Collin Mauro MD         No Known Allergies  Vitals:    11/09/22 1426   BP: 118/78   Pulse: 67     Body mass index is 29.94 kg/m². Review of Systems   Constitutional: Negative. HENT: Negative. Eyes: Negative. Respiratory: Negative. Cardiovascular: Negative. Gastrointestinal: Negative. Endocrine: Negative. Genitourinary: Negative. Negative for difficulty urinating, dyspareunia, dysuria, enuresis, frequency, hematuria, menstrual problem, pelvic pain, urgency and vaginal discharge. Musculoskeletal: Negative. Skin: Negative. Allergic/Immunologic: Negative. Neurological: Negative. Hematological: Negative. Psychiatric/Behavioral: Negative. Physical Exam  Vitals and nursing note reviewed. Constitutional:       General: She is not in acute distress. Appearance: She is well-developed. She is not diaphoretic. HENT:      Head: Normocephalic and atraumatic. Right Ear: External ear normal.      Left Ear: External ear normal.      Nose: Nose normal.      Mouth/Throat:      Mouth: Mucous membranes are moist.      Pharynx: Oropharynx is clear. Eyes:      General: Lids are normal.         Right eye: No discharge. Left eye: No discharge. Conjunctiva/sclera: Conjunctivae normal.      Pupils: Pupils are equal, round, and reactive to light. Neck:      Thyroid: No thyroid mass or thyromegaly. Trachea: No tracheal deviation. Cardiovascular:      Rate and Rhythm: Normal rate and regular rhythm. Heart sounds: Normal heart sounds. No murmur heard. Pulmonary:      Effort: Pulmonary effort is normal.      Breath sounds: Normal breath sounds. No wheezing. Chest:   Breasts:     Breasts are symmetrical.      Right: No inverted nipple, mass, nipple discharge, skin change or tenderness. Left: No inverted nipple, mass, nipple discharge, skin change or tenderness. Comments: Area marked oblong about 1-2cm mobile mass noted.  This is spot pt states noted with mammo  Abdominal:      General: Bowel sounds are normal. There is no distension. Palpations: Abdomen is soft. There is no mass. Tenderness: There is no abdominal tenderness. Hernia: There is no hernia in the left inguinal area or right inguinal area. Genitourinary:     General: Normal vulva. Labia:         Right: No rash, tenderness, lesion or injury. Left: No rash, tenderness, lesion or injury. Urethra: No prolapse, urethral pain, urethral swelling or urethral lesion. Vagina: Normal. No vaginal discharge or tenderness. Cervix: Normal. Discharge: normal cervical mucosa. Uterus: Normal. Not enlarged and not tender. Adnexa: Right adnexa normal and left adnexa normal.        Right: No mass, tenderness or fullness. Left: No mass, tenderness or fullness. Rectum: Normal. No mass, anal fissure or external hemorrhoid. Comments: [facility] swab collected vaginally  Musculoskeletal:         General: No tenderness. Normal range of motion. Cervical back: Normal range of motion and neck supple. Lymphadenopathy:      Cervical:      Right cervical: No superficial, deep or posterior cervical adenopathy. Left cervical: No superficial, deep or posterior cervical adenopathy. Upper Body:      Right upper body: No supraclavicular, pectoral or epitrochlear adenopathy. Left upper body: No supraclavicular, pectoral or epitrochlear adenopathy. Lower Body: No right inguinal adenopathy. No left inguinal adenopathy. Skin:     General: Skin is warm and dry. Capillary Refill: Capillary refill takes 2 to 3 seconds. Findings: No rash. Neurological:      Mental Status: She is alert and oriented to person, place, and time. She is not disoriented. Sensory: No sensory deficit. Coordination: Coordination normal.      Gait: Gait normal.      Deep Tendon Reflexes: Reflexes are normal and symmetric.    Psychiatric:         Speech: Speech normal.         Behavior: Behavior normal.         Thought Content: Thought content normal.         Judgment: Judgment normal.        Diagnosis Orders   1. Breast lump on right side at 10 o'clock position  [facility] DIGITAL DIAGNOSTIC UNILATERAL RIGHT    US BREAST COMPLETE RIGHT      2. Encounter for routine gynecologic examination in Medicare patient  UT CA SCREEN;PELVIC/BREAST EXAM      3. Screening mammogram, encounter for  [facility] DIGITAL SCREEN UNILATERAL LEFT      4. Family history of breast cancer  [facility] (2+79)      5. Genetic testing of female        6. Screen for STD (sexually transmitted disease)  Miscellaneous Sendout 2          MEDICATIONS:  No orders of the defined types were placed in this encounter. ORDERS:  Orders Placed This Encounter   Procedures    [facility] DIGITAL DIAGNOSTIC UNILATERAL RIGHT    US BREAST COMPLETE RIGHT    [facility]AK[facility] DIGITAL SCREEN UNILATERAL LEFT    [facility] (2+79)    Miscellaneous Sendout 2    UT CA SCREEN;PELVIC/BREAST EXAM       PLAN:  [facility] sent  Orders for dx imaging at [facility] in addition to screening for left. Agreeable to empower genetic testing, will follow with hermelindo german rn for results. Pending findings of genetic testing, mammo US, may need referral to gen surg to follow  There are no Patient Instructions on file for this visit.

## 2022-11-14 DIAGNOSIS — G47.00 INSOMNIA, UNSPECIFIED TYPE: ICD-10-CM

## 2022-11-14 RX ORDER — TRAZODONE HYDROCHLORIDE 100 MG/1
100 TABLET ORAL NIGHTLY
Qty: 90 TABLET | Refills: 0 | Status: SHIPPED | OUTPATIENT
Start: 2022-11-14

## 2022-11-14 NOTE — TELEPHONE ENCOUNTER
She does not have a follow up appointment.  Please call her to get her rescheduled Statement Selected

## 2022-11-16 ENCOUNTER — TELEPHONE (OUTPATIENT)
Dept: OBGYN CLINIC | Age: 42
End: 2022-11-16

## 2022-11-16 NOTE — TELEPHONE ENCOUNTER
Yomaira Aguiar with [facility] called and states pt has had the empower done through someone else and was wanting to see if this could be canceled that [employer] would probably not pay for a duplicate (ref # [id number]). Informed Julia and she is ok to cancel. Called and spoke with Mateus and gave the ok to cancel.  Cindy/REGGIE

## 2022-11-18 LAB
FOLATE: 3.1 NG/ML (ref 4.8–37.3)
HBA1C MFR BLD: 5.4 % (ref 4–6)
T4 FREE: 1.1 NG/DL (ref 0.93–1.7)
TSH SERPL DL<=0.05 MIU/L-ACNC: 4.11 UIU/ML (ref 0.27–4.2)
VITAMIN B-12: 460 PG/ML (ref 211–946)

## 2022-11-21 LAB
Lab: NORMAL
Lab: NORMAL

## 2022-12-28 ENCOUNTER — OFFICE VISIT (OUTPATIENT)
Age: 42
End: 2022-12-28
Payer: MEDICARE

## 2022-12-28 VITALS
SYSTOLIC BLOOD PRESSURE: 122 MMHG | WEIGHT: 169.4 LBS | HEART RATE: 84 BPM | HEIGHT: 63 IN | BODY MASS INDEX: 30.02 KG/M2 | DIASTOLIC BLOOD PRESSURE: 64 MMHG | OXYGEN SATURATION: 98 % | TEMPERATURE: 97.2 F

## 2022-12-28 DIAGNOSIS — B37.9 ANTIBIOTIC-INDUCED YEAST INFECTION: ICD-10-CM

## 2022-12-28 DIAGNOSIS — J02.9 SORE THROAT: ICD-10-CM

## 2022-12-28 DIAGNOSIS — T36.95XA ANTIBIOTIC-INDUCED YEAST INFECTION: ICD-10-CM

## 2022-12-28 DIAGNOSIS — J02.9 PHARYNGITIS, UNSPECIFIED ETIOLOGY: Primary | ICD-10-CM

## 2022-12-28 LAB — S PYO AG THROAT QL: NORMAL

## 2022-12-28 PROCEDURE — 99213 OFFICE O/P EST LOW 20 MIN: CPT | Performed by: PHYSICIAN ASSISTANT

## 2022-12-28 PROCEDURE — G8417 CALC BMI ABV UP PARAM F/U: HCPCS | Performed by: PHYSICIAN ASSISTANT

## 2022-12-28 PROCEDURE — G8427 DOCREV CUR MEDS BY ELIG CLIN: HCPCS | Performed by: PHYSICIAN ASSISTANT

## 2022-12-28 PROCEDURE — 4004F PT TOBACCO SCREEN RCVD TLK: CPT | Performed by: PHYSICIAN ASSISTANT

## 2022-12-28 PROCEDURE — G8484 FLU IMMUNIZE NO ADMIN: HCPCS | Performed by: PHYSICIAN ASSISTANT

## 2022-12-28 PROCEDURE — 87880 STREP A ASSAY W/OPTIC: CPT | Performed by: PHYSICIAN ASSISTANT

## 2022-12-28 RX ORDER — AMOXICILLIN AND CLAVULANATE POTASSIUM 875; 125 MG/1; MG/1
1 TABLET, FILM COATED ORAL EVERY 12 HOURS
Qty: 20 TABLET | Refills: 0 | Status: SHIPPED | OUTPATIENT
Start: 2022-12-28 | End: 2023-01-07

## 2022-12-28 RX ORDER — FLUCONAZOLE 150 MG/1
150 TABLET ORAL DAILY
Qty: 3 TABLET | Refills: 0 | Status: SHIPPED | OUTPATIENT
Start: 2022-12-28 | End: 2022-12-31

## 2022-12-28 ASSESSMENT — ENCOUNTER SYMPTOMS
NAUSEA: 0
SINUS PRESSURE: 0
SINUS PAIN: 0
SHORTNESS OF BREATH: 0
EYE PAIN: 0
DIARRHEA: 0
VOMITING: 0
SORE THROAT: 1
ABDOMINAL PAIN: 0
COUGH: 0
ALLERGIC/IMMUNOLOGIC NEGATIVE: 1

## 2022-12-28 NOTE — PROGRESS NOTES
[address]  [address] [address] [place] [address]  Dept: [phone]  Dept Fax: [phone]  Loc: [phone]    Jomar Matta is a 36 y.o. female who presents today for her medical conditions/complaints as noted below. Jomar Matta is complaining of Otalgia (For week both ears) and Pharyngitis    HPI:   Otalgia   There is pain in both ears. This is a new problem. The current episode started yesterday. The problem occurs constantly. The problem has been unchanged. There has been no fever. Associated symptoms include a sore throat. Pertinent negatives include no abdominal pain, coughing, diarrhea, headaches, rash or vomiting. She has tried nothing for the symptoms. Pharyngitis  This is a new problem. Episode onset: 10 days. The problem occurs constantly. The problem has been unchanged. Associated symptoms include fatigue and a sore throat. Pertinent negatives include no abdominal pain, chest pain, chills, congestion, coughing, fever, headaches, myalgias, nausea, rash, vomiting or weakness. The symptoms are aggravated by swallowing. She has tried nothing for the symptoms.        Past Medical History:   Diagnosis Date    Fibromyalgia     Fluid retention     H/O Raynaud's syndrome     Heartburn     Hypothyroidism due to Hashimoto's thyroiditis 9/19/2019    Labial cyst     sebaceous cyst and skin tag    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    Spastic colon     Vitamin D deficiency        Past Surgical History:   Procedure Laterality Date    APPENDECTOMY      CHOLECYSTECTOMY      COLONOSCOPY  2010    OVARY REMOVAL      left    [place] LYSIS OF LABIAL ADHESIONS N/A 2/16/2017    INCISION AND DRAINAGE OF SEBACEOUS CYST ON LABIA; SKIN TAG REMOVAL OF PERINEUM  performed by Simeon Prado MD at [address] ENDOSCOPY      VULVA SURGERY N/A 2/16/2017    LABIALPLASTY  performed by Pippa Goodwin Anshul Parada MD at [facility] OR       Family History   Problem Relation Age of Onset    Breast Cancer Mother 61    Stroke Maternal Grandmother     Stroke Maternal Grandfather     Breast Cancer Maternal Aunt         breast unsure of age    Breast Cancer Paternal Aunt         breast unsure of age    Breast Cancer Paternal Aunt         breast unsure of age    Breast Cancer Maternal Cousin 48       Social History     Tobacco Use    Smoking status: Every Day     Packs/day: 0.50     Years: 10.00     Pack years: 5.00     Types: Cigarettes    Smokeless tobacco: Never   Substance Use Topics    Alcohol use: Yes     Alcohol/week: 1.0 standard drink     Types: 1 Shots of liquor per week     Comment: 2 per month        Current Outpatient Medications   Medication Sig Dispense Refill    amoxicillin-clavulanate (AUGMENTIN) 875-125 MG per tablet Take 1 tablet by mouth in the morning and 1 tablet in the evening. Do all this for 10 days.  20 tablet 0    fluconazole (DIFLUCAN) 150 MG tablet Take 1 tablet by mouth daily for 3 days 3 tablet 0    traZODone (DESYREL) 100 MG tablet TAKE 1 TABLET BY MOUTH NIGHTLY 90 tablet 0    hydroxychloroquine (PLAQUENIL) 200 MG tablet hydroxychloroquine 200 mg tablet   TAKE 1 TABLET BY MOUTH EVERY DAY      furosemide (LASIX) 20 MG tablet Take 1 tablet by mouth at night 30 tablet 2    tiZANidine (ZANAFLEX) 4 MG tablet TAKE 1-2 TABLETS BY MOUTH EVERY 6-8 HOURS      azelastine HCl 0.15 % SOLN USE 2 SPRAYS TWICE A DAY AS NEEDED 30 mL 3    furosemide (LASIX) 40 MG tablet TAKE 1 TABLET BY MOUTH EVERY DAY 90 tablet 0    levothyroxine (SYNTHROID) 88 MCG tablet TAKE 1 TABLET BY MOUTH ONCE A DAY 90 tablet 1    rizatriptan (MAXALT) 10 MG tablet Take 1 tablet by mouth once as needed for Migraine May repeat in 2 hours if needed 30 tablet 2    famotidine (PEPCID) 20 MG tablet TAKE 1 TABLET BY MOUTH 2 TIMES DAILY AS NEEDED (REFLUX) 180 tablet 3    vitamin D (ERGOCALCIFEROL) 1.25 MG (34502 UT) CAPS capsule TAKE 1 CAPSULE BY MOUTH ONE TIME PER WEEK 4 capsule 5    dicyclomine (BENTYL) 20 MG tablet Take 1 tablet by mouth 2 times daily as needed (abdominal pain) 60 tablet 5    VENTOLIN  (90 Base) MCG/ACT inhaler INHALE 2 PUFFS INTO THE LUNGS 4 TIMES DAILY AS NEEDED FOR WHEEZING 18 Inhaler 0    busPIRone (BUSPAR) 10 MG tablet 10 mg 2 times daily       cyanocobalamin 1000 MCG/ML injection Inject 1 mL into the muscle every 30 days 1 mL 0    DULoxetine (CYMBALTA) 60 MG extended release capsule Take 60 mg by mouth daily       Current Facility-Administered Medications   Medication Dose Route Frequency Provider Last Rate Last Admin    cyanocobalamin injection 1,000 mcg  1,000 mcg IntraMUSCular Once Maribel Chung MD           No Known Allergies    Health Maintenance   Topic Date Due    COVID-19 Vaccine (1) Never done    Pneumococcal 0-64 years Vaccine (1 - PCV) Never done    Breast cancer screen  09/04/2020    Flu vaccine (1) Never done    Depression Monitoring  06/06/2023    Cervical cancer screen  08/20/2026    Lipids  08/17/2027    DTaP/Tdap/Td vaccine (3 - Td or Tdap) 06/29/2028    Hepatitis C screen  Completed    HIV screen  Completed    Hepatitis A vaccine  Aged Out    Hib vaccine  Aged Out    Meningococcal (ACWY) vaccine  Aged Out       Subjective:   Review of Systems   Constitutional:  Positive for fatigue. Negative for chills and fever. HENT:  Positive for ear pain and sore throat. Negative for congestion, postnasal drip, sinus pressure and sinus pain. Eyes:  Negative for pain and visual disturbance. Respiratory:  Negative for cough and shortness of breath. Cardiovascular:  Negative for chest pain. Gastrointestinal:  Negative for abdominal pain, diarrhea, nausea and vomiting. Endocrine: Negative for cold intolerance and heat intolerance. Genitourinary:  Negative for frequency, hematuria and urgency. Musculoskeletal:  Negative for myalgias. Skin:  Negative for rash. Allergic/Immunologic: Negative. Neurological:  Negative for syncope, weakness, light-headedness and headaches. Hematological: Negative. Psychiatric/Behavioral: Negative. Objective    Physical Exam  Vitals and nursing note reviewed. Constitutional:       General: She is not in acute distress. Appearance: Normal appearance. She is ill-appearing. HENT:      Head: Normocephalic and atraumatic. Right Ear: Tympanic membrane, ear canal and external ear normal.      Left Ear: Tympanic membrane, ear canal and external ear normal.      Ears:      Comments: Effusions bilterally     Nose: Congestion present. Mouth/Throat:      Mouth: Mucous membranes are moist.      Pharynx: Oropharynx is clear. Posterior oropharyngeal erythema present. Eyes:      Extraocular Movements: Extraocular movements intact. Conjunctiva/sclera: Conjunctivae normal.   Cardiovascular:      Rate and Rhythm: Normal rate and regular rhythm. Pulses: Normal pulses. Heart sounds: Normal heart sounds. Pulmonary:      Effort: Pulmonary effort is normal. No respiratory distress. Breath sounds: Normal breath sounds. No stridor. No wheezing, rhonchi or rales. Chest:      Chest wall: No tenderness. Abdominal:      General: Abdomen is flat. Bowel sounds are normal. There is no distension. Palpations: Abdomen is soft. Tenderness: There is no abdominal tenderness. Musculoskeletal:         General: Normal range of motion. Cervical back: Normal range of motion and neck supple. No tenderness. Lymphadenopathy:      Cervical: Cervical adenopathy present. Skin:     General: Skin is warm and dry. Findings: No erythema. Neurological:      General: No focal deficit present. Mental Status: She is alert and oriented to person, place, and time.    Psychiatric:         Mood and Affect: Mood normal.         Behavior: Behavior normal.       /64 (Site: Left Upper Arm)   Pulse 84   Temp 97.2 °F (36.2 °C) (Temporal)   Ht 5' 3\" (1.6 m)   Wt 169 lb 6.4 oz (76.8 kg)   LMP 12/25/2022   SpO2 98%   BMI 30.01 kg/m²     Assessment         Diagnosis Orders   1. Pharyngitis, unspecified etiology  amoxicillin-clavulanate (AUGMENTIN) 875-125 MG per tablet      2. Sore throat  POCT rapid strep A      3. Antibiotic-induced yeast infection  fluconazole (DIFLUCAN) 150 MG tablet          Plan   Complete full course of antibiotics as directed. Continue rest, increase hydration, take tylenol/ibuprofen as needed for fever/pain. Return to clinic or follow up with PCP if you worsen or fail to improve. Patient verbalizes understanding and agrees with treatment plan. Orders Placed This Encounter   Procedures    POCT rapid strep A       Results for orders placed or performed in visit on 12/28/22   POCT rapid strep A   Result Value Ref Range    Strep A Ag None Detected None Detected       Orders Placed This Encounter   Medications    amoxicillin-clavulanate (AUGMENTIN) 875-125 MG per tablet     Sig: Take 1 tablet by mouth in the morning and 1 tablet in the evening. Do all this for 10 days. Dispense:  20 tablet     Refill:  0    fluconazole (DIFLUCAN) 150 MG tablet     Sig: Take 1 tablet by mouth daily for 3 days     Dispense:  3 tablet     Refill:  0      New Prescriptions    AMOXICILLIN-CLAVULANATE (AUGMENTIN) 875-125 MG PER TABLET    Take 1 tablet by mouth in the morning and 1 tablet in the evening. Do all this for 10 days. FLUCONAZOLE (DIFLUCAN) 150 MG TABLET    Take 1 tablet by mouth daily for 3 days        Return if symptoms worsen or fail to improve. Discussed use, benefits, and side effects of any prescribed medications. All patient questions were answered. Patient voiced understanding of care plan. Patient was given educational materials - see patient instructions below. Patient Instructions   Complete full course of antibiotics as directed.   Continue rest, increase hydration, take tylenol/ibuprofen as needed for fever/pain. Return to clinic or follow up with PCP if you worsen or fail to improve. Patient verbalizes understanding and agrees with treatment plan.       Electronically signed by Madeline Cassidy PA-C on 12/28/2022 at 5:19 PM

## 2023-01-19 LAB
FOLATE: 7 NG/ML (ref 4.8–37.3)
TSH REFLEX FT4: 2 UIU/ML (ref 0.35–5.5)

## 2023-02-06 ENCOUNTER — TELEPHONE (OUTPATIENT)
Dept: OBGYN CLINIC | Age: 43
End: 2023-02-06

## 2023-02-06 NOTE — TELEPHONE ENCOUNTER
Daija requests that OFFICE  return their call. The best time to reach her is Anytime. Patient had message from nurse that Patient needs a VV mychart with Miquel Salas. Please called the patient. Thank you.

## 2023-02-08 ENCOUNTER — TELEMEDICINE (OUTPATIENT)
Dept: OBGYN CLINIC | Age: 43
End: 2023-02-08
Payer: MEDICARE

## 2023-02-08 DIAGNOSIS — E06.3 HASHIMOTO'S DISEASE: ICD-10-CM

## 2023-02-08 DIAGNOSIS — E04.9 GOITER: ICD-10-CM

## 2023-02-08 DIAGNOSIS — E04.2 MULTIPLE THYROID NODULES: ICD-10-CM

## 2023-02-08 DIAGNOSIS — Z71.2 ENCOUNTER TO DISCUSS TEST RESULTS: ICD-10-CM

## 2023-02-08 DIAGNOSIS — N93.8 DUB (DYSFUNCTIONAL UTERINE BLEEDING): Primary | ICD-10-CM

## 2023-02-08 PROCEDURE — 99214 OFFICE O/P EST MOD 30 MIN: CPT | Performed by: NURSE PRACTITIONER

## 2023-02-08 PROCEDURE — G8427 DOCREV CUR MEDS BY ELIG CLIN: HCPCS | Performed by: NURSE PRACTITIONER

## 2023-02-08 NOTE — PROGRESS NOTES
Michael Yadav is a 36 y.o. female who presents today for her medical conditions/ complaints as noted below. Michael Yadav is c/o of Irregular Menses  [facility]  Nurse Practitioner Office Note  TELEHEALTH EVALUATION -- Audio/Visual (During [id number] public health emergency)    Michael Yadav is a 36 y.o. female who presents today for her medical conditions/ complaints as noted below. Chief Complaint   Patient presents with    Irregular Menses         HPI  Has been bleeding every 2 weeks since December. Pt has labs due end of month for thyroid. Sees endocrinology out of town, note reviewed and plan. My interpretation is needs repeat US soon for suspicious nodule but pt thinks not till September. Meds were adjusted in November. Patient Active Problem List   Diagnosis    Cyst of ovary    Pain in female genitalia on intercourse    Hypertrophy of labia    Cyst of vagina    Lesion of vulva    Thyroid nodule    Hypothyroidism due to Hashimoto's thyroiditis    Migraine    Pain, neck    Photophobia    Fibromyalgia    Nicotine dependence, cigarettes, uncomplicated    Fatigue    Hashimoto's thyroiditis    Macrocytosis    Macroglossia    Nontoxic single thyroid nodule    Raynaud's phenomenon    Weight gain       No LMP recorded.   [id number]    Past Medical History:   Diagnosis Date    Fibromyalgia     Fluid retention     H/O Raynaud's syndrome     Heartburn     Hypothyroidism due to Hashimoto's thyroiditis 9/19/2019    Labial cyst     sebaceous cyst and skin tag    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    Spastic colon     Vitamin D deficiency      Past Surgical History:   Procedure Laterality Date    APPENDECTOMY      CHOLECYSTECTOMY      COLONOSCOPY  2010    OVARY REMOVAL      left    [place] LYSIS LABIAL ADHESIONS N/A 2/16/2017    INCISION AND DRAINAGE OF SEBACEOUS CYST ON LABIA; SKIN TAG REMOVAL OF PERINEUM  performed by Henrietta Quintero MD at [address] [place] [address] GASTROINTESTINAL ENDOSCOPY      VULVA SURGERY N/A 2017    LABIALPLASTY  performed by Hari Chester MD at [facility] OR     Family History   Problem Relation Age of Onset    Breast Cancer Mother 61    Stroke Maternal Grandmother     Stroke Maternal Grandfather     Breast Cancer Maternal Aunt         breast unsure of age    Breast Cancer Paternal Aunt         breast unsure of age    Breast Cancer Paternal Aunt         breast unsure of age    Breast Cancer Maternal Cousin 48     Social History     Tobacco Use    Smoking status: Every Day     Packs/day: 0.50     Years: 10.00     Pack years: 5.00     Types: Cigarettes    Smokeless tobacco: Never   Substance Use Topics    Alcohol use:  Yes     Alcohol/week: 1.0 standard drink     Types: 1 Shots of liquor per week     Comment: 2 per month       Current Outpatient Medications   Medication Sig Dispense Refill    traZODone (DESYREL) 100 MG tablet TAKE 1 TABLET BY MOUTH NIGHTLY 90 tablet 0    hydroxychloroquine (PLAQUENIL) 200 MG tablet hydroxychloroquine 200 mg tablet   TAKE 1 TABLET BY MOUTH EVERY DAY      furosemide (LASIX) 20 MG tablet Take 1 tablet by mouth at night 30 tablet 2    tiZANidine (ZANAFLEX) 4 MG tablet TAKE 1-2 TABLETS BY MOUTH EVERY 6-8 HOURS      azelastine HCl 0.15 % SOLN USE 2 SPRAYS TWICE A DAY AS NEEDED 30 mL 3    furosemide (LASIX) 40 MG tablet TAKE 1 TABLET BY MOUTH EVERY DAY 90 tablet 0    levothyroxine (SYNTHROID) 88 MCG tablet TAKE 1 TABLET BY MOUTH ONCE A DAY (Patient taking differently: Take 100 mcg by mouth Daily TAKE 1 TABLET BY MOUTH ONCE A DAY) 90 tablet 1    rizatriptan (MAXALT) 10 MG tablet Take 1 tablet by mouth once as needed for Migraine May repeat in 2 hours if needed 30 tablet 2    famotidine (PEPCID) 20 MG tablet TAKE 1 TABLET BY MOUTH 2 TIMES DAILY AS NEEDED (REFLUX) 180 tablet 3    vitamin D (ERGOCALCIFEROL) 1.25 MG (97397 UT) CAPS capsule TAKE 1 CAPSULE BY MOUTH ONE TIME PER WEEK 4 capsule 5    dicyclomine (BENTYL) 20 MG tablet Take 1 tablet by mouth 2 times daily as needed (abdominal pain) 60 tablet 5    VENTOLIN  (90 Base) MCG/ACT inhaler INHALE 2 PUFFS INTO THE LUNGS 4 TIMES DAILY AS NEEDED FOR WHEEZING 18 Inhaler 0    busPIRone (BUSPAR) 10 MG tablet 10 mg 2 times daily       cyanocobalamin 1000 MCG/ML injection Inject 1 mL into the muscle every 30 days 1 mL 0    DULoxetine (CYMBALTA) 60 MG extended release capsule Take 60 mg by mouth daily       Current Facility-Administered Medications   Medication Dose Route Frequency Provider Last Rate Last Admin    cyanocobalamin injection 1,000 mcg  1,000 mcg IntraMUSCular Once Treva Espana MD         No Known Allergies  There were no vitals filed for this visit. There is no height or weight on file to calculate BMI. Review of Systems   Constitutional: Negative. HENT: Negative. Eyes: Negative. Respiratory: Negative. Cardiovascular: Negative. Gastrointestinal: Negative. Endocrine: Negative. Genitourinary:  Positive for menstrual problem. Negative for difficulty urinating, dyspareunia, dysuria, enuresis, frequency, hematuria, pelvic pain, urgency and vaginal discharge. Musculoskeletal: Negative. Skin: Negative. Allergic/Immunologic: Negative. Neurological: Negative. Hematological: Negative. Psychiatric/Behavioral: Negative. Due to this being a TeleHealth encounter, evaluation of the following organ systems is limited: Vitals/Constitutional/EENT/Resp/CV/GI//MS/Neuro/Skin/Heme-Lymph-Imm. Physical Exam  Constitutional:       General: She is not in acute distress. Appearance: She is well-developed. She is not diaphoretic. HENT:      Head: Normocephalic and atraumatic. Eyes:      Conjunctiva/sclera: Conjunctivae normal.      Pupils: Pupils are equal, round, and reactive to light. Pulmonary:      Effort: Pulmonary effort is normal.   Abdominal:      Tenderness: There is no guarding.    Musculoskeletal:         General: Normal range of motion. Cervical back: Normal range of motion. Comments: Normal ROM in all 4 extremities; normal gait   Skin:     General: Skin is warm and dry. Neurological:      Mental Status: She is alert and oriented to person, place, and time. Motor: No abnormal muscle tone. Coordination: Coordination normal.   Psychiatric:         Behavior: Behavior normal.        Diagnosis Orders   1. DUB (dysfunctional uterine bleeding)        2. Hashimoto's disease        3. Multiple thyroid nodules        4. Goiter        5. Encounter to discuss test results            MEDICATIONS:  No orders of the defined types were placed in this encounter. ORDERS:  No orders of the defined types were placed in this encounter. PLAN:  Pt to contact her endocrinologist regarding timing of thyroid US and to check thyroid lab to see if stable. I will want pelvic US to help determine source aub if thyroid normal.   Plan pending   Over 50% of the total visit time of 30 minutes was spent on counseling and/or coordination of care of aub, past and recent thyroid notes and results, medication management. All questions were answered and the patient voiced understanding. Pursuant to the emergency declaration under the [facility], [facility] waiver authority and the [employer] Act, this Virtual  Visit was conducted, with patient's consent, to reduce the patient's risk of exposure to COVID-19 and provide continuity of care for an established patient. Services were provided through a video synchronous discussion virtually to substitute for in-person clinic visit.

## 2023-02-22 DIAGNOSIS — N92.6 IRREGULAR MENSES: Primary | ICD-10-CM

## 2023-02-28 ENCOUNTER — HOSPITAL ENCOUNTER (OUTPATIENT)
Dept: ULTRASOUND IMAGING | Age: 43
Discharge: HOME | End: 2023-02-28
Payer: MEDICARE

## 2023-02-28 DIAGNOSIS — N92.6 IRREGULAR MENSES: ICD-10-CM

## 2023-02-28 PROCEDURE — 76830 TRANSVAGINAL US NON-OB: CPT

## 2023-02-28 PROCEDURE — 76830 TRANSVAGINAL US NON-OB: CPT | Performed by: RADIOLOGY

## 2023-03-06 LAB
BASOPHILS ABSOLUTE: 0.1 K/UL (ref 0–0.2)
BASOPHILS RELATIVE PERCENT: 0.8 % (ref 0–1)
EOSINOPHILS ABSOLUTE: 0.4 K/UL (ref 0–0.6)
EOSINOPHILS RELATIVE PERCENT: 6 % (ref 0–5)
FOLLICLE STIMULATING HORMONE: 8 MIU/ML
HCT VFR BLD CALC: 42.3 % (ref 37–47)
HEMOGLOBIN: 13.8 G/DL (ref 12–16)
IMMATURE GRANULOCYTES #: 0 K/UL
LYMPHOCYTES ABSOLUTE: 2.4 K/UL (ref 1.1–4.5)
LYMPHOCYTES RELATIVE PERCENT: 32.2 % (ref 20–40)
MCH RBC QN AUTO: 32.6 PG (ref 27–31)
MCHC RBC AUTO-ENTMCNC: 32.6 G/DL (ref 33–37)
MCV RBC AUTO: 100 FL (ref 81–99)
MONOCYTES ABSOLUTE: 0.5 K/UL (ref 0–0.9)
MONOCYTES RELATIVE PERCENT: 6.5 % (ref 0–10)
NEUTROPHILS ABSOLUTE: 4 K/UL (ref 1.5–7.5)
NEUTROPHILS RELATIVE PERCENT: 54.2 % (ref 50–65)
PDW BLD-RTO: 13.2 % (ref 11.5–14.5)
PLATELET # BLD: 319 K/UL (ref 130–400)
PMV BLD AUTO: 10 FL (ref 9.4–12.3)
PROGESTERONE LEVEL: 0.58 NG/ML
RBC # BLD: 4.23 M/UL (ref 4.2–5.4)
TSH REFLEX FT4: 1.73 UIU/ML (ref 0.35–5.5)
WBC # BLD: 7.4 K/UL (ref 4.8–10.8)

## 2023-03-14 RX ORDER — FAMOTIDINE 20 MG/1
20 TABLET, FILM COATED ORAL 2 TIMES DAILY PRN
Qty: 180 TABLET | Refills: 3 | OUTPATIENT
Start: 2023-03-14

## 2023-04-14 ENCOUNTER — PATIENT MESSAGE (OUTPATIENT)
Dept: OBGYN CLINIC | Age: 43
End: 2023-04-14

## 2023-05-08 ENCOUNTER — OFFICE VISIT (OUTPATIENT)
Dept: OBGYN CLINIC | Age: 43
End: 2023-05-08

## 2023-05-08 VITALS
BODY MASS INDEX: 29.23 KG/M2 | SYSTOLIC BLOOD PRESSURE: 118 MMHG | HEART RATE: 76 BPM | WEIGHT: 165 LBS | DIASTOLIC BLOOD PRESSURE: 72 MMHG | HEIGHT: 63 IN

## 2023-05-08 DIAGNOSIS — E06.3 HASHIMOTO'S DISEASE: ICD-10-CM

## 2023-05-08 DIAGNOSIS — Z71.9 ENCOUNTER FOR CONSULTATION: ICD-10-CM

## 2023-05-08 DIAGNOSIS — N93.8 DUB (DYSFUNCTIONAL UTERINE BLEEDING): Primary | ICD-10-CM

## 2023-05-08 RX ORDER — DESVENLAFAXINE SUCCINATE 50 MG/1
50 TABLET, EXTENDED RELEASE ORAL DAILY
COMMUNITY
Start: 2023-02-09

## 2023-05-08 ASSESSMENT — ENCOUNTER SYMPTOMS
RESPIRATORY NEGATIVE: 1
GASTROINTESTINAL NEGATIVE: 1
EYES NEGATIVE: 1

## 2023-05-18 ASSESSMENT — ENCOUNTER SYMPTOMS: ALLERGIC/IMMUNOLOGIC NEGATIVE: 1

## 2023-06-05 ENCOUNTER — HOSPITAL ENCOUNTER (OUTPATIENT)
Dept: PREADMISSION TESTING | Age: 43
Discharge: HOME | End: 2023-06-09
Payer: MEDICARE

## 2023-06-05 VITALS — BODY MASS INDEX: 28.53 KG/M2 | HEIGHT: 63 IN | WEIGHT: 161 LBS

## 2023-06-05 LAB
ANION GAP SERPL CALCULATED.3IONS-SCNC: 13 MMOL/L (ref 7–19)
BASOPHILS # BLD: 0 K/UL (ref 0–0.2)
BASOPHILS NFR BLD: 0.3 % (ref 0–1)
BUN SERPL-MCNC: 9 MG/DL (ref 6–20)
CALCIUM SERPL-MCNC: 9.4 MG/DL (ref 8.6–10)
CHLORIDE SERPL-SCNC: 100 MMOL/L (ref 98–111)
CO2 SERPL-SCNC: 27 MMOL/L (ref 22–29)
CREAT SERPL-MCNC: 0.9 MG/DL (ref 0.5–0.9)
EOSINOPHIL # BLD: 0.3 K/UL (ref 0–0.6)
EOSINOPHIL NFR BLD: 1.9 % (ref 0–5)
ERYTHROCYTE [DISTWIDTH] IN BLOOD BY AUTOMATED COUNT: 12.8 % (ref 11.5–14.5)
GLUCOSE SERPL-MCNC: 114 MG/DL (ref 74–109)
HCT VFR BLD AUTO: 43.6 % (ref 37–47)
HGB BLD-MCNC: 14.7 G/DL (ref 12–16)
IMM GRANULOCYTES # BLD: 0.1 K/UL
LYMPHOCYTES # BLD: 2.2 K/UL (ref 1.1–4.5)
LYMPHOCYTES NFR BLD: 17 % (ref 20–40)
MCH RBC QN AUTO: 32.9 PG (ref 27–31)
MCHC RBC AUTO-ENTMCNC: 33.7 G/DL (ref 33–37)
MCV RBC AUTO: 97.5 FL (ref 81–99)
MONOCYTES # BLD: 0.8 K/UL (ref 0–0.9)
MONOCYTES NFR BLD: 5.8 % (ref 0–10)
NEUTROPHILS # BLD: 9.6 K/UL (ref 1.5–7.5)
NEUTS SEG NFR BLD: 74.6 % (ref 50–65)
PLATELET # BLD AUTO: 355 K/UL (ref 130–400)
PMV BLD AUTO: 9.8 FL (ref 9.4–12.3)
POTASSIUM SERPL-SCNC: 3.3 MMOL/L (ref 3.5–5)
RBC # BLD AUTO: 4.47 M/UL (ref 4.2–5.4)
SODIUM SERPL-SCNC: 140 MMOL/L (ref 136–145)
WBC # BLD AUTO: 12.9 K/UL (ref 4.8–10.8)

## 2023-06-05 PROCEDURE — 80048 BASIC METABOLIC PNL TOTAL CA: CPT

## 2023-06-05 PROCEDURE — 85025 COMPLETE CBC W/AUTO DIFF WBC: CPT

## 2023-06-05 RX ORDER — PHENTERMINE HYDROCHLORIDE 37.5 MG/1
37.5 CAPSULE ORAL EVERY MORNING
COMMUNITY

## 2023-06-05 NOTE — DISCHARGE INSTRUCTIONS
The day before surgery you will receive a phone call from the surgery nurse to let you know what time to arrive on the day of surgery. This call will usually be between 2-4 PM. If you do not receive a phone call by 4 PM the day before your surgery please call [phone] and let them know you have not received an arrival time. If your surgery is on Monday, your call will be on the Friday before your Monday surgery. The morning of surgery, you may take all your prescribed medications with a sip of water. Any exceptions to this would be listed below: Hold Phentermine for 7 days before surgery. PREOPERATIVE GUIDELINES WHEN RECEIVING ANESTHESIA    Do not eat or drink anything after midnight, the night before your surgery. No gum or candy the morning of surgery. This is extremely important for your safety. Take a bath (or shower) the night before your surgery and you may brush your teeth the morning of your surgery. You will be scheduled to arrive at the hospital 2 hours before your surgery, or follow your surgeon's instructions. Dress comfortably. Wear loose clothing that will be easy to remove and comfortable for your trip home. You may wear eyeglasses or contacts but bring your cases with you as they must be remove before your surgery. Hearing aids and dentures will need to be removed before your surgery. Do not wear any jewelry, including body jewelry. All jewelry will need to be removed prior to your surgery. Do not wear fingernail polish or make-up. It is best not to bring any valuables with you. If you are to stay in the hospital overnight, bring your robe, slippers and personal toiletries that you may need. POSTOPERATIVE GUIDELINES AFTER RECEIVING ANESTHESIA    If you are to go home after your surgery, you will need a responsible adult to drive you home.      You will not be able to take public transportation after your discharge from the Operative Care Unit unless you

## 2023-06-16 ENCOUNTER — HOSPITAL ENCOUNTER (OUTPATIENT)
Age: 43
Setting detail: OUTPATIENT SURGERY
Discharge: HOME | End: 2023-06-16
Attending: OBSTETRICS & GYNECOLOGY | Admitting: OBSTETRICS & GYNECOLOGY
Payer: MEDICARE

## 2023-06-16 VITALS
SYSTOLIC BLOOD PRESSURE: 108 MMHG | TEMPERATURE: 98.2 F | RESPIRATION RATE: 18 BRPM | HEART RATE: 65 BPM | HEIGHT: 63 IN | OXYGEN SATURATION: 94 % | BODY MASS INDEX: 28.35 KG/M2 | DIASTOLIC BLOOD PRESSURE: 58 MMHG | WEIGHT: 160 LBS

## 2023-06-16 DIAGNOSIS — N92.6 IRREGULAR BLEEDING: ICD-10-CM

## 2023-06-16 DIAGNOSIS — N93.8 DUB (DYSFUNCTIONAL UTERINE BLEEDING): ICD-10-CM

## 2023-06-16 DIAGNOSIS — Z98.890 S/P D&C (STATUS POST DILATION AND CURETTAGE): Primary | ICD-10-CM

## 2023-06-16 LAB — HCG UR QL: NEGATIVE

## 2023-06-16 PROCEDURE — 3700000001 HC ADD 15 MINUTES (ANESTHESIA): Performed by: OBSTETRICS & GYNECOLOGY

## 2023-06-16 PROCEDURE — A4216 STERILE WATER/SALINE, 10 ML: HCPCS | Performed by: ANESTHESIOLOGY

## 2023-06-16 PROCEDURE — 7100000001 HC PACU RECOVERY - ADDTL 15 MIN: Performed by: OBSTETRICS & GYNECOLOGY

## 2023-06-16 PROCEDURE — 2580000003 HC RX 258: Performed by: ANESTHESIOLOGY

## 2023-06-16 PROCEDURE — 6360000002 HC RX W HCPCS: Performed by: ANESTHESIOLOGY

## 2023-06-16 PROCEDURE — 2720000010 HC SURG SUPPLY STERILE: Performed by: OBSTETRICS & GYNECOLOGY

## 2023-06-16 PROCEDURE — 58563 HYSTEROSCOPY ABLATION: CPT | Performed by: OBSTETRICS & GYNECOLOGY

## 2023-06-16 PROCEDURE — 3600000014 HC SURGERY LEVEL 4 ADDTL 15MIN: Performed by: OBSTETRICS & GYNECOLOGY

## 2023-06-16 PROCEDURE — 7100000010 HC PHASE II RECOVERY - FIRST 15 MIN: Performed by: OBSTETRICS & GYNECOLOGY

## 2023-06-16 PROCEDURE — 2709999900 HC NON-CHARGEABLE SUPPLY: Performed by: OBSTETRICS & GYNECOLOGY

## 2023-06-16 PROCEDURE — 88331 PATH CONSLTJ SURG 1 BLK 1SPC: CPT

## 2023-06-16 PROCEDURE — 2580000003 HC RX 258: Performed by: OBSTETRICS & GYNECOLOGY

## 2023-06-16 PROCEDURE — 3600000004 HC SURGERY LEVEL 4 BASE: Performed by: OBSTETRICS & GYNECOLOGY

## 2023-06-16 PROCEDURE — 7100000000 HC PACU RECOVERY - FIRST 15 MIN: Performed by: OBSTETRICS & GYNECOLOGY

## 2023-06-16 PROCEDURE — 3700000000 HC ANESTHESIA ATTENDED CARE: Performed by: OBSTETRICS & GYNECOLOGY

## 2023-06-16 PROCEDURE — 6360000002 HC RX W HCPCS: Performed by: OBSTETRICS & GYNECOLOGY

## 2023-06-16 PROCEDURE — 84703 CHORIONIC GONADOTROPIN ASSAY: CPT

## 2023-06-16 PROCEDURE — 6370000000 HC RX 637 (ALT 250 FOR IP): Performed by: OBSTETRICS & GYNECOLOGY

## 2023-06-16 PROCEDURE — 88305 TISSUE EXAM BY PATHOLOGIST: CPT

## 2023-06-16 PROCEDURE — 2500000003 HC RX 250 WO HCPCS: Performed by: ANESTHESIOLOGY

## 2023-06-16 PROCEDURE — 7100000011 HC PHASE II RECOVERY - ADDTL 15 MIN: Performed by: OBSTETRICS & GYNECOLOGY

## 2023-06-16 RX ORDER — DEXAMETHASONE SODIUM PHOSPHATE 4 MG/ML
4 INJECTION, SOLUTION INTRA-ARTICULAR; INTRALESIONAL; INTRAMUSCULAR; INTRAVENOUS; SOFT TISSUE ONCE
Status: DISCONTINUED | OUTPATIENT
Start: 2023-06-16 | End: 2023-06-16 | Stop reason: SDUPTHER

## 2023-06-16 RX ORDER — LIDOCAINE HYDROCHLORIDE 10 MG/ML
1 INJECTION, SOLUTION EPIDURAL; INFILTRATION; INTRACAUDAL; PERINEURAL
Status: DISCONTINUED | OUTPATIENT
Start: 2023-06-16 | End: 2023-06-16 | Stop reason: SDUPTHER

## 2023-06-16 RX ORDER — IBUPROFEN 800 MG/1
800 TABLET ORAL EVERY 8 HOURS PRN
Qty: 60 TABLET | Refills: 0 | Status: SHIPPED | OUTPATIENT
Start: 2023-06-16

## 2023-06-16 RX ORDER — SODIUM CHLORIDE 0.9 % (FLUSH) 0.9 %
5-40 SYRINGE (ML) INJECTION PRN
Status: DISCONTINUED | OUTPATIENT
Start: 2023-06-16 | End: 2023-06-16 | Stop reason: HOSPADM

## 2023-06-16 RX ORDER — FENTANYL CITRATE 50 UG/ML
50 INJECTION, SOLUTION INTRAMUSCULAR; INTRAVENOUS
Status: DISCONTINUED | OUTPATIENT
Start: 2023-06-16 | End: 2023-06-16 | Stop reason: SDUPTHER

## 2023-06-16 RX ORDER — FENTANYL CITRATE 50 UG/ML
25 INJECTION, SOLUTION INTRAMUSCULAR; INTRAVENOUS
Status: DISCONTINUED | OUTPATIENT
Start: 2023-06-16 | End: 2023-06-16 | Stop reason: SDUPTHER

## 2023-06-16 RX ORDER — MIDAZOLAM HYDROCHLORIDE 2 MG/2ML
2 INJECTION, SOLUTION INTRAMUSCULAR; INTRAVENOUS
Status: COMPLETED | OUTPATIENT
Start: 2023-06-16 | End: 2023-06-16

## 2023-06-16 RX ORDER — SODIUM CHLORIDE 0.9 % (FLUSH) 0.9 %
5-40 SYRINGE (ML) INJECTION EVERY 12 HOURS SCHEDULED
Status: DISCONTINUED | OUTPATIENT
Start: 2023-06-16 | End: 2023-06-16 | Stop reason: HOSPADM

## 2023-06-16 RX ORDER — FENTANYL CITRATE 50 UG/ML
50 INJECTION, SOLUTION INTRAMUSCULAR; INTRAVENOUS EVERY 5 MIN PRN
Status: DISCONTINUED | OUTPATIENT
Start: 2023-06-16 | End: 2023-06-16 | Stop reason: HOSPADM

## 2023-06-16 RX ORDER — PROCHLORPERAZINE EDISYLATE 5 MG/ML
5 INJECTION INTRAMUSCULAR; INTRAVENOUS
Status: DISCONTINUED | OUTPATIENT
Start: 2023-06-16 | End: 2023-06-16 | Stop reason: HOSPADM

## 2023-06-16 RX ORDER — SODIUM CHLORIDE 0.9 % (FLUSH) 0.9 %
5-40 SYRINGE (ML) INJECTION PRN
Status: DISCONTINUED | OUTPATIENT
Start: 2023-06-16 | End: 2023-06-16 | Stop reason: SDUPTHER

## 2023-06-16 RX ORDER — LIDOCAINE HYDROCHLORIDE 10 MG/ML
1 INJECTION, SOLUTION EPIDURAL; INFILTRATION; INTRACAUDAL; PERINEURAL
Status: DISCONTINUED | OUTPATIENT
Start: 2023-06-16 | End: 2023-06-16 | Stop reason: HOSPADM

## 2023-06-16 RX ORDER — DIPHENHYDRAMINE HYDROCHLORIDE 50 MG/ML
12.5 INJECTION INTRAMUSCULAR; INTRAVENOUS
Status: DISCONTINUED | OUTPATIENT
Start: 2023-06-16 | End: 2023-06-16 | Stop reason: HOSPADM

## 2023-06-16 RX ORDER — DEXAMETHASONE SODIUM PHOSPHATE 4 MG/ML
4 INJECTION, SOLUTION INTRA-ARTICULAR; INTRALESIONAL; INTRAMUSCULAR; INTRAVENOUS; SOFT TISSUE ONCE
Status: COMPLETED | OUTPATIENT
Start: 2023-06-16 | End: 2023-06-16

## 2023-06-16 RX ORDER — SODIUM CHLORIDE 9 MG/ML
INJECTION, SOLUTION INTRAVENOUS PRN
Status: DISCONTINUED | OUTPATIENT
Start: 2023-06-16 | End: 2023-06-16 | Stop reason: HOSPADM

## 2023-06-16 RX ORDER — HYDROMORPHONE HYDROCHLORIDE 1 MG/ML
0.5 INJECTION, SOLUTION INTRAMUSCULAR; INTRAVENOUS; SUBCUTANEOUS EVERY 5 MIN PRN
Status: COMPLETED | OUTPATIENT
Start: 2023-06-16 | End: 2023-06-16

## 2023-06-16 RX ORDER — MIDAZOLAM HYDROCHLORIDE 2 MG/2ML
2 INJECTION, SOLUTION INTRAMUSCULAR; INTRAVENOUS
Status: DISCONTINUED | OUTPATIENT
Start: 2023-06-16 | End: 2023-06-16 | Stop reason: SDUPTHER

## 2023-06-16 RX ORDER — ACETAMINOPHEN AND CODEINE PHOSPHATE 300; 30 MG/1; MG/1
1 TABLET ORAL EVERY 6 HOURS PRN
Qty: 12 TABLET | Refills: 0 | Status: SHIPPED | OUTPATIENT
Start: 2023-06-16 | End: 2023-06-19

## 2023-06-16 RX ORDER — FENTANYL CITRATE 50 UG/ML
25 INJECTION, SOLUTION INTRAMUSCULAR; INTRAVENOUS EVERY 5 MIN PRN
Status: DISCONTINUED | OUTPATIENT
Start: 2023-06-16 | End: 2023-06-16 | Stop reason: HOSPADM

## 2023-06-16 RX ORDER — SODIUM CHLORIDE 0.9 % (FLUSH) 0.9 %
5-40 SYRINGE (ML) INJECTION EVERY 12 HOURS SCHEDULED
Status: DISCONTINUED | OUTPATIENT
Start: 2023-06-16 | End: 2023-06-16 | Stop reason: SDUPTHER

## 2023-06-16 RX ORDER — FENTANYL CITRATE 50 UG/ML
50 INJECTION, SOLUTION INTRAMUSCULAR; INTRAVENOUS
Status: DISCONTINUED | OUTPATIENT
Start: 2023-06-16 | End: 2023-06-16 | Stop reason: HOSPADM

## 2023-06-16 RX ORDER — SODIUM CHLORIDE, SODIUM LACTATE, POTASSIUM CHLORIDE, CALCIUM CHLORIDE 600; 310; 30; 20 MG/100ML; MG/100ML; MG/100ML; MG/100ML
INJECTION, SOLUTION INTRAVENOUS CONTINUOUS
Status: DISCONTINUED | OUTPATIENT
Start: 2023-06-16 | End: 2023-06-16 | Stop reason: HOSPADM

## 2023-06-16 RX ORDER — ACETAMINOPHEN AND CODEINE PHOSPHATE 300; 30 MG/1; MG/1
1 TABLET ORAL EVERY 4 HOURS PRN
Status: DISCONTINUED | OUTPATIENT
Start: 2023-06-16 | End: 2023-06-16 | Stop reason: HOSPADM

## 2023-06-16 RX ORDER — ONDANSETRON 2 MG/ML
4 INJECTION INTRAMUSCULAR; INTRAVENOUS
Status: COMPLETED | OUTPATIENT
Start: 2023-06-16 | End: 2023-06-16

## 2023-06-16 RX ORDER — FENTANYL CITRATE 50 UG/ML
25 INJECTION, SOLUTION INTRAMUSCULAR; INTRAVENOUS
Status: DISCONTINUED | OUTPATIENT
Start: 2023-06-16 | End: 2023-06-16 | Stop reason: HOSPADM

## 2023-06-16 RX ADMIN — MIDAZOLAM HYDROCHLORIDE 2 MG: 2 INJECTION, SOLUTION INTRAMUSCULAR; INTRAVENOUS at 08:51

## 2023-06-16 RX ADMIN — FENTANYL CITRATE 50 MCG: 50 INJECTION, SOLUTION INTRAMUSCULAR; INTRAVENOUS at 10:27

## 2023-06-16 RX ADMIN — FAMOTIDINE 20 MG: 10 INJECTION, SOLUTION INTRAVENOUS at 08:00

## 2023-06-16 RX ADMIN — ACETAMINOPHEN AND CODEINE PHOSPHATE 1 TABLET: 300; 30 TABLET ORAL at 11:36

## 2023-06-16 RX ADMIN — HYDROMORPHONE HYDROCHLORIDE 0.5 MG: 1 INJECTION, SOLUTION INTRAMUSCULAR; INTRAVENOUS; SUBCUTANEOUS at 10:59

## 2023-06-16 RX ADMIN — HYDROMORPHONE HYDROCHLORIDE 0.5 MG: 1 INJECTION, SOLUTION INTRAMUSCULAR; INTRAVENOUS; SUBCUTANEOUS at 10:54

## 2023-06-16 RX ADMIN — ONDANSETRON 4 MG: 2 INJECTION INTRAMUSCULAR; INTRAVENOUS at 10:23

## 2023-06-16 RX ADMIN — FENTANYL CITRATE 50 MCG: 50 INJECTION, SOLUTION INTRAMUSCULAR; INTRAVENOUS at 10:35

## 2023-06-16 RX ADMIN — SODIUM CHLORIDE, SODIUM LACTATE, POTASSIUM CHLORIDE, AND CALCIUM CHLORIDE: 600; 310; 30; 20 INJECTION, SOLUTION INTRAVENOUS at 07:29

## 2023-06-16 RX ADMIN — DEXAMETHASONE SODIUM PHOSPHATE 4 MG: 4 INJECTION, SOLUTION INTRAMUSCULAR; INTRAVENOUS at 07:59

## 2023-06-16 RX ADMIN — HYDROMORPHONE HYDROCHLORIDE 0.5 MG: 1 INJECTION, SOLUTION INTRAMUSCULAR; INTRAVENOUS; SUBCUTANEOUS at 11:13

## 2023-06-16 RX ADMIN — HYDROMORPHONE HYDROCHLORIDE 0.5 MG: 1 INJECTION, SOLUTION INTRAMUSCULAR; INTRAVENOUS; SUBCUTANEOUS at 11:08

## 2023-06-16 ASSESSMENT — ENCOUNTER SYMPTOMS
GASTROINTESTINAL NEGATIVE: 1
RESPIRATORY NEGATIVE: 1
ALLERGIC/IMMUNOLOGIC NEGATIVE: 1
EYES NEGATIVE: 1

## 2023-06-16 ASSESSMENT — PAIN DESCRIPTION - LOCATION
LOCATION: ABDOMEN
LOCATION: ABDOMEN;BACK
LOCATION: ABDOMEN

## 2023-06-16 ASSESSMENT — PAIN SCALES - GENERAL
PAINLEVEL_OUTOF10: 6
PAINLEVEL_OUTOF10: 10
PAINLEVEL_OUTOF10: 10
PAINLEVEL_OUTOF10: 9
PAINLEVEL_OUTOF10: 8
PAINLEVEL_OUTOF10: 10

## 2023-06-16 ASSESSMENT — PAIN DESCRIPTION - DESCRIPTORS
DESCRIPTORS: CRAMPING

## 2023-06-16 ASSESSMENT — PAIN DESCRIPTION - PAIN TYPE: TYPE: ACUTE PAIN;SURGICAL PAIN

## 2023-06-16 ASSESSMENT — PAIN - FUNCTIONAL ASSESSMENT: PAIN_FUNCTIONAL_ASSESSMENT: 0-10

## 2023-06-16 NOTE — OP NOTE
PREOPERATIVE DIAGNOSES:   1. Menometrorrhagia    POSTOPERATIVE DIAGNOSES:   1. Menometrorrhagia     PROCEDURE PERFORMED:    1. EUA  2. Hysteroscopy  3. Dilation   4. Myosure Biopsies  5. Novasure endometrial ablation    ANESTHESIA:  General    SURGEON:  Dr. Nancy Morrison:  * No surgical staff found *     ESTIMATED BLOOD LOSS:  Less than 50 mL    COMPLICATIONS:  None    SPECIMENS:  Endometrial tissue sent for frozen section    FINDINGS:  Cavity lenghth 4.5 cm, Width 3.6cm. Total treatment time 1 minute 20 seconds    DESCRIPTION OF PROCEDURE:  The patient was taken to the operating room where she was placed under general anesthesia. She was positioned in dorsal lithotomy, prepped and draped in usual sterile fashion. A weighted speculum was placed into the vagina and the anterior lip of the cervix was grasped with a single-tooth tenaculum. The os was serially dilated to accommodate the hysteroscope which was placed without complication and the above findings were noted. Directed biopsies taken under direct visualization and sent for evaluation. Next the Novasure device was placed and seated with the above measurements noted. Cavity assessment was performed and proved to be sound. The device was then engaged for a total treatment time of 1 min 20 seconds and then removed. Final look with the hysteroscope showed good treatment effect. At this time, all instruments were removed. Tenaculum sites were found to be hemostatic. The procedure was concluded. The patient was awakened in the operating room and brought to PACU in stable condition.

## 2023-06-16 NOTE — PROGRESS NOTES
[facility] CLINICAL PHARMACY NOTE: MEDS TO BEDS    Total # of Prescriptions Filled: 2   The following medications were delivered to the patient:  Discharge Medication List as of 6/16/2023 11:40 AM        START taking these medications    Details   acetaminophen-codeine (TYLENOL/CODEINE #3) 300-30 MG per tablet Take 1 tablet by mouth every 6 hours as needed for Pain for up to 3 days. Intended supply: 3 days. Take lowest dose possible to manage pain Max Daily Amount: 4 tablets, Disp-12 tablet, R-0Normal      ibuprofen (ADVIL;MOTRIN) 800 MG tablet Take 1 tablet by mouth every 8 hours as needed for Pain, Disp-60 tablet, R-0Normal               Additional Documentation:    Delivered RX's to patient bedside. Paid with cash.

## 2023-06-16 NOTE — H&P
HEBER De La Torre is a 36 y.o. female with h/o menometrorrhagia and cervical stenosis who presents for Endometrial ablation. Review of Systems   Constitutional: Negative. HENT: Negative. Eyes: Negative. Respiratory: Negative. Cardiovascular: Negative. Gastrointestinal: Negative. Endocrine: Negative. Genitourinary: Negative. Musculoskeletal: Negative. Skin: Negative. Allergic/Immunologic: Negative. Neurological: Negative. Hematological: Negative. Psychiatric/Behavioral: Negative. Past Medical History:   Diagnosis Date    Fibromyalgia     Fluid retention     H/O Raynaud's syndrome     Heartburn     Hypothyroidism due to Hashimoto's thyroiditis 9/19/2019    Labial cyst     sebaceous cyst and skin tag    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    Spastic colon     Vitamin D deficiency      Past Surgical History:   Procedure Laterality Date    APPENDECTOMY      CHOLECYSTECTOMY      COLONOSCOPY  2010    OVARY REMOVAL      left    [place] LYSIS LABIAL ADHESIONS N/A 2/16/2017    INCISION AND DRAINAGE OF SEBACEOUS CYST ON LABIA; SKIN TAG REMOVAL OF PERINEUM  performed by Pete Roldan MD at [address] [place] [address]      TYMPANOSTOMY TUBE PLACEMENT      UPPER GASTROINTESTINAL ENDOSCOPY      VULVA SURGERY N/A 2/16/2017    LABIALPLASTY  performed by Pete Roldan MD at [facility] OR     Family History   Problem Relation Age of Onset    Breast Cancer Mother 61    Stroke Maternal Grandmother     Stroke Maternal Grandfather     Breast Cancer Maternal Aunt         breast unsure of age    Breast Cancer Paternal Aunt         breast unsure of age    Breast Cancer Paternal Aunt         breast unsure of age    Breast Cancer Maternal Cousin 48     Social History     Tobacco Use    Smoking status: Every Day     Packs/day: 0.50     Years: 10.00     Pack years: 5.00     Types: Cigarettes    Smokeless tobacco: Never   Vaping Use    Vaping Use: Never used   Substance Use Topics    Alcohol use:  Yes

## 2023-07-26 ENCOUNTER — TRANSCRIBE ORDERS (OUTPATIENT)
Dept: ADMINISTRATIVE | Facility: HOSPITAL | Age: 43
End: 2023-07-26
Payer: MEDICARE

## 2023-07-26 ENCOUNTER — LAB (OUTPATIENT)
Dept: LAB | Facility: HOSPITAL | Age: 43
End: 2023-07-26
Payer: MEDICARE

## 2023-07-26 DIAGNOSIS — R07.0 PAIN IN THROAT: ICD-10-CM

## 2023-07-26 DIAGNOSIS — M35.00 SJOGREN'S SYNDROME, WITH UNSPECIFIED ORGAN INVOLVEMENT: ICD-10-CM

## 2023-07-26 DIAGNOSIS — M35.00 SJOGREN'S SYNDROME, WITH UNSPECIFIED ORGAN INVOLVEMENT: Primary | ICD-10-CM

## 2023-07-26 PROCEDURE — 86235 NUCLEAR ANTIGEN ANTIBODY: CPT

## 2023-07-26 PROCEDURE — 87799 DETECT AGENT NOS DNA QUANT: CPT

## 2023-07-26 PROCEDURE — 87081 CULTURE SCREEN ONLY: CPT

## 2023-07-26 PROCEDURE — 86160 COMPLEMENT ANTIGEN: CPT

## 2023-07-26 PROCEDURE — 36415 COLL VENOUS BLD VENIPUNCTURE: CPT

## 2023-07-26 PROCEDURE — 86038 ANTINUCLEAR ANTIBODIES: CPT

## 2023-07-26 PROCEDURE — 86225 DNA ANTIBODY NATIVE: CPT

## 2023-07-28 LAB
ANA SER QL: NEGATIVE
C3 SERPL-MCNC: 127 MG/DL (ref 82–167)
C4 SERPL-MCNC: 21 MG/DL (ref 12–38)
CHROMATIN AB SERPL-ACNC: <0.2 AI (ref 0–0.9)
DSDNA AB SER-ACNC: <1 IU/ML (ref 0–9)
ENA SS-A AB SER-ACNC: <0.2 AI (ref 0–0.9)
ENA SS-B AB SER-ACNC: <0.2 AI (ref 0–0.9)

## 2023-07-29 LAB
BACTERIA SPEC AEROBE CULT: NORMAL
EBV DNA SERPL NAA+PROBE-ACNC: NEGATIVE IU/ML

## 2023-08-18 ENCOUNTER — TRANSCRIBE ORDERS (OUTPATIENT)
Dept: ADMINISTRATIVE | Facility: HOSPITAL | Age: 43
End: 2023-08-18
Payer: MEDICARE

## 2023-08-18 ENCOUNTER — LAB (OUTPATIENT)
Dept: LAB | Facility: HOSPITAL | Age: 43
End: 2023-08-18
Payer: MEDICARE

## 2023-08-18 DIAGNOSIS — R59.0 LOCALIZED ENLARGED LYMPH NODES: ICD-10-CM

## 2023-08-18 DIAGNOSIS — R59.0 LOCALIZED ENLARGED LYMPH NODES: Primary | ICD-10-CM

## 2023-08-18 LAB — HETEROPH AB SER QL LA: NEGATIVE

## 2023-08-18 PROCEDURE — 36415 COLL VENOUS BLD VENIPUNCTURE: CPT

## 2023-08-18 PROCEDURE — 86308 HETEROPHILE ANTIBODY SCREEN: CPT

## 2023-08-18 PROCEDURE — 86778 TOXOPLASMA ANTIBODY IGM: CPT

## 2023-08-18 PROCEDURE — 86618 LYME DISEASE ANTIBODY: CPT

## 2023-08-18 PROCEDURE — 86777 TOXOPLASMA ANTIBODY: CPT

## 2023-08-19 LAB
B BURGDOR IGG+IGM SER QL IA: NEGATIVE
LABORATORY COMMENT REPORT: NORMAL
T GONDII IGG SERPL IA-ACNC: <3 IU/ML (ref 0–7.1)
T GONDII IGM SER IA-ACNC: <3 AU/ML (ref 0–7.9)

## 2023-09-14 ENCOUNTER — HOSPITAL ENCOUNTER (OUTPATIENT)
Dept: GENERAL RADIOLOGY | Facility: HOSPITAL | Age: 43
Discharge: HOME | End: 2023-09-14
Admitting: NURSE PRACTITIONER
Payer: MEDICARE

## 2023-09-14 ENCOUNTER — TRANSCRIBE ORDERS (OUTPATIENT)
Dept: ADMINISTRATIVE | Facility: HOSPITAL | Age: 43
End: 2023-09-14
Payer: MEDICARE

## 2023-09-14 DIAGNOSIS — R05.9 COUGH, UNSPECIFIED TYPE: Primary | ICD-10-CM

## 2023-09-14 DIAGNOSIS — R05.9 COUGH, UNSPECIFIED TYPE: ICD-10-CM

## 2023-09-14 PROCEDURE — 71046 X-RAY EXAM CHEST 2 VIEWS: CPT

## 2023-09-15 ENCOUNTER — TRANSCRIBE ORDERS (OUTPATIENT)
Dept: ADMINISTRATIVE | Facility: HOSPITAL | Age: 43
End: 2023-09-15
Payer: MEDICARE

## 2023-09-15 DIAGNOSIS — G35 MS (MULTIPLE SCLEROSIS): Primary | ICD-10-CM

## 2023-09-18 ENCOUNTER — APPOINTMENT (OUTPATIENT)
Dept: CT IMAGING | Facility: HOSPITAL | Age: 43
End: 2023-09-18

## 2023-09-18 ENCOUNTER — HOSPITAL ENCOUNTER (EMERGENCY)
Facility: HOSPITAL | Age: 43
Discharge: HOME | End: 2023-09-18
Admitting: STUDENT IN AN ORGANIZED HEALTH CARE EDUCATION/TRAINING PROGRAM

## 2023-09-18 VITALS
HEART RATE: 62 BPM | TEMPERATURE: 97.8 F | SYSTOLIC BLOOD PRESSURE: 136 MMHG | RESPIRATION RATE: 18 BRPM | BODY MASS INDEX: 27.29 KG/M2 | OXYGEN SATURATION: 97 % | DIASTOLIC BLOOD PRESSURE: 78 MMHG | HEIGHT: 63 IN | WEIGHT: 154 LBS

## 2023-09-18 DIAGNOSIS — R20.2 NUMBNESS AND TINGLING OF RIGHT ARM: Primary | ICD-10-CM

## 2023-09-18 DIAGNOSIS — R60.9 SWELLING: ICD-10-CM

## 2023-09-18 DIAGNOSIS — R20.0 NUMBNESS AND TINGLING OF RIGHT ARM: Primary | ICD-10-CM

## 2023-09-18 LAB
ALBUMIN SERPL-MCNC: 4.9 G/DL (ref 3.5–5.2)
ALBUMIN/GLOB SERPL: 2 G/DL
ALP SERPL-CCNC: 72 U/L (ref 39–117)
ALT SERPL W P-5'-P-CCNC: 19 U/L (ref 1–33)
ANION GAP SERPL CALCULATED.3IONS-SCNC: 12 MMOL/L (ref 5–15)
AST SERPL-CCNC: 15 U/L (ref 1–32)
BASOPHILS # BLD AUTO: 0.05 10*3/MM3 (ref 0–0.2)
BASOPHILS NFR BLD AUTO: 0.4 % (ref 0–1.5)
BILIRUB SERPL-MCNC: 0.4 MG/DL (ref 0–1.2)
BUN SERPL-MCNC: 7 MG/DL (ref 6–20)
BUN/CREAT SERPL: 11.7 (ref 7–25)
CALCIUM SPEC-SCNC: 9.6 MG/DL (ref 8.6–10.5)
CHLORIDE SERPL-SCNC: 100 MMOL/L (ref 98–107)
CO2 SERPL-SCNC: 29 MMOL/L (ref 22–29)
CREAT SERPL-MCNC: 0.6 MG/DL (ref 0.57–1)
DEPRECATED RDW RBC AUTO: 48.5 FL (ref 37–54)
EGFRCR SERPLBLD CKD-EPI 2021: 113.7 ML/MIN/1.73
EOSINOPHIL # BLD AUTO: 0.06 10*3/MM3 (ref 0–0.4)
EOSINOPHIL NFR BLD AUTO: 0.5 % (ref 0.3–6.2)
ERYTHROCYTE [DISTWIDTH] IN BLOOD BY AUTOMATED COUNT: 13.3 % (ref 12.3–15.4)
GLOBULIN UR ELPH-MCNC: 2.5 GM/DL
GLUCOSE SERPL-MCNC: 100 MG/DL (ref 65–99)
HCT VFR BLD AUTO: 44.7 % (ref 34–46.6)
HGB BLD-MCNC: 14.6 G/DL (ref 12–15.9)
IMM GRANULOCYTES # BLD AUTO: 0.06 10*3/MM3 (ref 0–0.05)
IMM GRANULOCYTES NFR BLD AUTO: 0.5 % (ref 0–0.5)
LYMPHOCYTES # BLD AUTO: 2.4 10*3/MM3 (ref 0.7–3.1)
LYMPHOCYTES NFR BLD AUTO: 18.8 % (ref 19.6–45.3)
MCH RBC QN AUTO: 32.4 PG (ref 26.6–33)
MCHC RBC AUTO-ENTMCNC: 32.7 G/DL (ref 31.5–35.7)
MCV RBC AUTO: 99.1 FL (ref 79–97)
MONOCYTES # BLD AUTO: 0.47 10*3/MM3 (ref 0.1–0.9)
MONOCYTES NFR BLD AUTO: 3.7 % (ref 5–12)
NEUTROPHILS NFR BLD AUTO: 76.1 % (ref 42.7–76)
NEUTROPHILS NFR BLD AUTO: 9.76 10*3/MM3 (ref 1.7–7)
NRBC BLD AUTO-RTO: 0 /100 WBC (ref 0–0.2)
PLATELET # BLD AUTO: 325 10*3/MM3 (ref 140–450)
PMV BLD AUTO: 9.6 FL (ref 6–12)
POTASSIUM SERPL-SCNC: 3.7 MMOL/L (ref 3.5–5.2)
PROT SERPL-MCNC: 7.4 G/DL (ref 6–8.5)
RBC # BLD AUTO: 4.51 10*6/MM3 (ref 3.77–5.28)
SODIUM SERPL-SCNC: 141 MMOL/L (ref 136–145)
WBC NRBC COR # BLD: 12.8 10*3/MM3 (ref 3.4–10.8)

## 2023-09-18 PROCEDURE — 85025 COMPLETE CBC W/AUTO DIFF WBC: CPT | Performed by: STUDENT IN AN ORGANIZED HEALTH CARE EDUCATION/TRAINING PROGRAM

## 2023-09-18 PROCEDURE — 70450 CT HEAD/BRAIN W/O DYE: CPT

## 2023-09-18 PROCEDURE — 80053 COMPREHEN METABOLIC PANEL: CPT

## 2023-09-18 PROCEDURE — 99284 EMERGENCY DEPT VISIT MOD MDM: CPT

## 2023-09-18 RX ORDER — SODIUM CHLORIDE 0.9 % (FLUSH) 0.9 %
10 SYRINGE (ML) INJECTION AS NEEDED
Status: DISCONTINUED | OUTPATIENT
Start: 2023-09-18 | End: 2023-09-18 | Stop reason: HOSPADM

## 2023-09-18 NOTE — Clinical Note
[facility] EMERGENCY DEPARTMENT  [address]  [facility] [address]  Phone: [phone]    Leatha Goldman was seen and treated in our emergency department on 9/18/2023.  She may return to work on 09/20/2023.         Thank you for choosing [facility].    Suleman Pereira, APRN

## 2023-09-18 NOTE — ED PROVIDER NOTES
"Subjective   History of Present Illness  Patient is a 43-year-old female that presents emergency department for new onset right upper extremity swelling accompanied by numbness and tingling.  Patient reports that she had an ear infection to the right ear in July.  She states that she was treated for this but following this she has had multiple complications that has led to several test from several different specialists over the last few months.  She states that she has been experiencing some swelling to the right side of her face that goes into the right side of her neck and now has started to go into the right side of her arm.  She states that she is scheduled to see ENT as well as rheumatology for this issue.  She also reports that she has an MRI of her brain scheduled to rule out MS.  Patient reports that she has several autoimmune disorders.  She has a past medical history of fibromyalgia, GERD, Hashimoto's, Raynaud's, hypertension, hypokalemia, hypothyroidism, IBS and vitamin D deficiency.  Patient reports that she tried to follow-up with her primary care provider today regarding new his symptoms but due to provider not being in office she reported to the ED.  Patient reports upon awakening at 9 AM she began experiencing worse swelling to the right upper extremity accompanied by a feeling of \"tightness due to the swelling in her hands and fingers \"as well as numbness and tingling sensation to the right upper extremity.  Patient states that the numbness and tingling and swelling has all been confined to the right upper extremity.  She denies any symptoms to the right lower extremity.  She states that numbness and tingling is the only symptom presenting today.  She denies any nausea, vomiting, chest pain, shortness of breath, abdominal pain, palpitations, lightheadedness or dizziness.  She denies any fevers, cough or congestion.  Denies any facial droop, confusion, blurred vision, headache, unilateral weakness " or recent falls.    Review of Systems   HENT:  Positive for ear pain and facial swelling.    Neurological:  Positive for numbness.        Numbness or tingling to the right upper extremity no unilateral weakness noted.   All other systems reviewed and are negative.    Past Medical History:   Diagnosis Date    Depression     Dyspnea     Edema     Epigastric abdominal pain     Fatigue     Fibromyalgia     GERD (gastroesophageal reflux disease)     Globus sensation     Hashimoto's thyroiditis     Hypertension     Hypokalemia     Hypothyroidism     IBS (irritable bowel syndrome)     Migraine     Obesity     Vitamin D deficiency        Allergies   Allergen Reactions    Armodafinil Rash     Thrush and mouth ulcers       Past Surgical History:   Procedure Laterality Date    APPENDECTOMY      CHOLECYSTECTOMY      EAR TUBES      ENDOSCOPY  05/10/2010    EXPLORATORY LAPAROTOMY      LEFT OOPHORECTOMY      TUBAL ABDOMINAL LIGATION         History reviewed. No pertinent family history.    Social History     Socioeconomic History    Marital status:    Tobacco Use    Smoking status: Every Day     Packs/day: 1.00     Types: Cigarettes    Smokeless tobacco: Never   Substance and Sexual Activity    Alcohol use: Yes     Comment: occasional    Drug use: No           Objective   Physical Exam  Vitals and nursing note reviewed.   Constitutional:       Appearance: Normal appearance.      Comments: Nontoxic appearing. In no acute distress.    HENT:      Head: Normocephalic and atraumatic.      Right Ear: External ear normal. Tympanic membrane is bulging.      Left Ear: External ear normal.      Nose: Nose normal.      Mouth/Throat:      Lips: Pink.      Mouth: Mucous membranes are moist.      Pharynx: Oropharynx is clear.   Eyes:      Extraocular Movements: Extraocular movements intact.      Conjunctiva/sclera: Conjunctivae normal.      Pupils: Pupils are equal, round, and reactive to light.   Neck:      Trachea: Trachea normal.    Cardiovascular:      Rate and Rhythm: Normal rate and regular rhythm.      Pulses: Normal pulses.      Heart sounds: Normal heart sounds.   Pulmonary:      Effort: Pulmonary effort is normal. No respiratory distress.      Breath sounds: Normal breath sounds. No wheezing.   Chest:      Chest wall: No tenderness.   Abdominal:      General: Abdomen is flat. Bowel sounds are normal. There is no distension.      Palpations: Abdomen is soft.      Tenderness: There is no abdominal tenderness. There is no right CVA tenderness, left CVA tenderness, guarding or rebound.   Musculoskeletal:         General: Normal range of motion.      Cervical back: Normal range of motion and neck supple. No edema, erythema, signs of trauma, rigidity, tenderness or crepitus. No pain with movement, spinous process tenderness or muscular tenderness.      Right lower leg: No edema.      Left lower leg: No edema.   Skin:     General: Skin is warm and dry.      Capillary Refill: Capillary refill takes less than 2 seconds.      Comments: No evidence of facial swelling, swelling to the neck, or swelling to the upper extremity noted upon exam.    Neurological:      General: No focal deficit present.      Mental Status: She is alert and oriented to person, place, and time. Mental status is at baseline.      GCS: GCS eye subscore is 4. GCS verbal subscore is 5. GCS motor subscore is 6.      Cranial Nerves: Cranial nerves 2-12 are intact.      Sensory: Sensation is intact.      Motor: Motor function is intact.      Coordination: Coordination is intact.      Gait: Gait is intact.      Comments: NIH score: 0 points   Psychiatric:         Attention and Perception: Attention and perception normal.         Mood and Affect: Mood and affect normal.         Speech: Speech normal.         Behavior: Behavior normal. Behavior is cooperative.         Thought Content: Thought content normal.         Cognition and Memory: Cognition and memory normal.          Judgment: Judgment normal.     Labs Reviewed   COMPREHENSIVE METABOLIC PANEL - Abnormal; Notable for the following components:       Result Value    Glucose 100 (*)     All other components within normal limits    Narrative:     GFR Normal >60  Chronic Kidney Disease <60  Kidney Failure <15     CBC WITH AUTO DIFFERENTIAL - Abnormal; Notable for the following components:    WBC 12.80 (*)     MCV 99.1 (*)     Neutrophil % 76.1 (*)     Lymphocyte % 18.8 (*)     Monocyte % 3.7 (*)     Neutrophils, Absolute 9.76 (*)     Immature Grans, Absolute 0.06 (*)     All other components within normal limits   CBC AND DIFFERENTIAL    Narrative:     The following orders were created for panel order CBC & Differential.  Procedure                               Abnormality         Status                     ---------                               -----------         ------                     CBC Auto Differential[[id number]]        Abnormal            Final result                 Please view results for these tests on the individual orders.      CT Head Without Contrast   Final Result   1. No acute intracranial process.           This report was finalized on 09/18/2023 19:59 by Dr. Nick Holley MD.           Procedures           ED Course                                           Medical Decision Making  Leatha Goldman is a 43 y.o. female who presents to the ED for new onset right upper extremity swelling accompanied by numbness and tingling.  Patient reports that she had an ear infection to the right ear in July.  She states that she was treated for this but following this she has had multiple complications that has led to several test from several different specialists over the last few months.  She states that she has been experiencing some swelling to the right side of her face that goes into the right side of her neck and now has started to go into the right side of her arm.  She states that she is scheduled to see ENT as  "well as rheumatology for this issue.  She also reports that she has an MRI of her brain scheduled to rule out MS.  Patient reports that she has several autoimmune disorders.  She has a past medical history of fibromyalgia, GERD, Hashimoto's, Raynaud's, hypertension, hypokalemia, hypothyroidism, IBS and vitamin D deficiency.  Patient reports that she tried to follow-up with her primary care provider today regarding new his symptoms but due to provider not being in office she reported to the ED.  Patient reports upon awakening at 9 AM she began experiencing worse swelling to the right upper extremity accompanied by a feeling of \"tightness due to the swelling in her hands and fingers \"as well as numbness and tingling sensation to the right upper extremity.  Patient states that the numbness and tingling and swelling has all been confined to the right upper extremity.  She denies any symptoms to the right lower extremity.  She states that numbness and tingling is the only symptom presenting today.  She denies any nausea, vomiting, chest pain, shortness of breath, abdominal pain, palpitations, lightheadedness or dizziness.  She denies any fevers, cough or congestion.  Denies any facial droop, confusion, blurred vision, headache, unilateral weakness or recent falls.    Patient was non-toxic appearing on arrival. No acute distress was noted.  Vital signs stable.    Past medical history, surgical history, and medication regimen reviewed.     Previous notes, labs, imaging and more reviewed.    Patient's presentation raises suspicion for differentials including, but not limited to, ICH, TIA, nerve impingement, acute otitis media, allergic reaction.     Please refer to above section of note for lab and imaging results that were reviewed and interpreted by radiology as well as attending physician.    NIH score: 0 points    Patient's presentation to the ED today as well as past medical history, labs and imaging performed in the " ED was with attending physician, Dr. Raymond who is in agreement with ED course and discharge.    Given findings described above, patient's presentation is likely consistent with numbness and tingling of the right arm. I have a low suspicion for any intracranial abnormality such as ICH at this point in their ED course.      I had an in-depth discussion with the patient as well as that is present at bedside regarding all of the lab and imaging results completed during this ED encounter today.  I discussed that CT imaging is unremarkable.  Also discussed that lab work reveals an elevated white blood cell count but this appears to be chronic compared to previous lab results.  Discussed that otherwise all labs were unremarkable.  I did highly encourage the patient to follow-up with her primary care provider as well as already scheduled specialist such as ENT and rheumatology.  I also highly advised the patient to continue with scheduled MRI of the brain for further evaluation and treatment of all symptoms. I answered all the questions regarding the emergency department evaluation, diagnosis, and treatment plan in plain and simple language that was understandable. We discussed that due to always having some diagnostic uncertainty while in the ER, there is always a chance that symptoms may change or new symptoms may reveal themselves after being discharged. Because of this, I stressed the importance of Leatha following up with their primary care provider and other specialist. Patient informed that appointment will need to be done by calling their office to set up an appointment within the next few days or as soon as reasonably possible so that the symptoms can be re-evaluated for improvement or for any other questions. I also gave Leatha common sense return precautions and prompted patient to return to the emergency department within 24 - 48hrs if there are any new, worsening, or concerning symptoms. The patient verbalized  understanding of the discharge instructions and agreed with them. Leatha was discharged in stable condition.     Dragon disclaimer:  Parts of this note may be an electronic transcription/translation of spoken language to printed text using the Dragon dictation system.       Problems Addressed:  Numbness and tingling of right arm: complicated acute illness or injury  Swelling: complicated acute illness or injury    Amount and/or Complexity of Data Reviewed  Radiology: ordered.    Risk  Prescription drug management.        Final diagnoses:   Numbness and tingling of right arm   Swelling       ED Disposition  ED Disposition       ED Disposition   Discharge    Condition   Stable    Comment   --               Jasmine Vuong, APRN  [address] [facility] [address]  [phone]    Schedule an appointment as soon as possible for a visit       [facility] EMERGENCY DEPARTMENT  [address] [place] [id number] [phone]    If symptoms worsen         Medication List      No changes were made to your prescriptions during this visit.            Suleman Pereira, APRN  09/19/23 152

## 2023-09-19 NOTE — DISCHARGE INSTRUCTIONS
It was very nice to meet you, Leatha. Thank you for allowing us to take care of you today at [facility].    Today you were seen in the emergency department for your symptoms. Please understand that an ER evaluation is just the start of your evaluation. We do the best we can, but we are often unable to fully find what is causing your symptoms from one evaluation.  Because of this, the goal is to determine whether you need to be evaluated in the hospital or if it is safe for you to go home and see other doctors provided such as primary care physicians or specialist on an outpatient basis.     Like we discussed, I strongly urge that you follow up with your primary care doctor. Please call their office to set up an appointment as soon as possible so that you can be re-evaluated for improvement in your symptoms or for any other questions.  I have provided the primary care provider information needed, including phone number, to call to set up an appointment below in these discharge papers.  Please continue with your already scheduled appointments as well as your MRI.      Educational material has also been provided in the following pages regarding what we have discussed today.     Please return to the emergency room within 12-48 hours if you experience symptoms such as the following:   Fever, chills, chest pain or shortness of breath, pain with inspiration/expiration, pain that travels to your arms, neck or back, nausea, vomiting, severe headache, tearing pain in your chest, dizziness, feel as though you are about to pass out, OR if you have any worsening symptoms, or any other concerns.

## 2023-10-06 NOTE — PROGRESS NOTES
[facility] – [place], [place] - Infectious Diseases Consult Note    Patient:  Leatha Goldman  YOB: 1980  MRN: [id number]   Primary Care Physician: Jasmine Vuong APRN  Referring Physician: Jasmine Vuong APRN     Chief Complaint:   Chief Complaint   Patient presents with    Cervical lymphadenopathy, ear pain and sore throat with nigh     No complaints       Interval History/HPI: Pleasant 43-year-old woman.  She was referred to infectious diseases because of concern for lymphadenopathy in the right neck.  When I asked for the start of her symptoms she indicates she has had some problems with autoimmune stuff.  She indicates back in July she had developed some fatigue.  She had also had some headache.  She had had some right ear fullness.  She thought it might be autoimmune related.  She saw Jasmine Vuong the [employer] that she works with.  She indicates she was not having ear pain, but showed physical exam evidence of a right ear infection.  She reports receiving a couple of courses of antibiotic therapy.  She is still has some sense of some fullness in the right neck area.  She feels there is a spot right up near the angle of the jaw that is present when she turns her head a particular way.  During the course of the last month or 2 she had noticed some intermittent fullness or adenopathy in the right neck and she also felt some fullness and swelling in the right shoulder and upper arm area.  She indicates she has had 2 courses of prednisone as well.  The other symptom that has bothered her is fatigue.  She indicates her energy has been poor.  She does not describe fever.  She indicates after having a rough couple of months symptoms are actually improved at this time.  For the past 10 days to 2 weeks she has felt better overall.  She is not having neck pain or discomfort.  She is not having right ear pain at present.  She has an appointment with ENT in about 1 week.  She also has an upcoming appointment with  rheumatology.  She indicates a history of Hashimoto's.  She indicates she has had thyroid checked within the last month.  She is on Synthroid replacement.  She indicates she has had thyroid ultrasound as well.  Infectious disease asked to evaluate and offer recommendations.    Allergies:   Allergies   Allergen Reactions    Armodafinil Rash     Thrush and mouth ulcers    Thrush and mouth ulcers   Thrush and mouth ulcers   Thrush and mouth ulcers     Current Scheduled Medications:   Current Outpatient Medications   Medication Instructions    azelastine (ASTEPRO) 0.15 % solution nasal spray USE 2 SPRAYS TWICE A DAY AS NEEDED    busPIRone (BUSPAR) 15 mg, Oral, 2 Times Daily    cyanocobalamin 1000 MCG/ML injection INJECT 1 ML SUBCUTANEOUSLY EVERY MONTH    desvenlafaxine (PRISTIQ) 100 mg, Oral, Daily    dicyclomine (BENTYL) 20 mg, Oral, Every 6 Hours, prn    DULoxetine (CYMBALTA) 60 mg, Oral, Daily    famotidine (PEPCID) 20 mg, Oral, 2 Times Daily, prn    furosemide (LASIX) 20 mg, Oral, Daily    hydroxychloroquine (PLAQUENIL) 200 mg, Oral, Daily    ibuprofen (ADVIL,MOTRIN) 800 MG tablet     levocetirizine (XYZAL) 5 MG tablet 1 tablet, Oral, Daily    levothyroxine (SYNTHROID, LEVOTHROID) 75 mcg, Oral, Daily    methylPREDNISolone acetate (DEPO-medrol) 40 MG/ML injection Take 1 mL by injection route.    rizatriptan MLT (MAXALT-MLT) 5 mg, Oral, Once As Needed, May repeat in 2 hours if needed     tiZANidine (ZANAFLEX) 4 mg, Oral, 2 Times Daily    traZODone (DESYREL) 100 MG tablet 1 tablet, Oral, Every Night at Bedtime    vitamin D (ERGOCALCIFEROL) 50,000 Units, Oral, Weekly     Venous Access Review  Line/IV site: No current IV Access  Antimicrobial Review  Currently on antibiotics/antifungals: YES/NO: NO    Past Medical History:   Diagnosis Date    Depression     Dyspnea     Edema     Epigastric abdominal pain     Fatigue     Fibromyalgia     GERD (gastroesophageal reflux disease)     Globus sensation     Hashimoto's  "thyroiditis     Hypertension     Hypokalemia     Hypothyroidism     IBS (irritable bowel syndrome)     Migraine     Obesity     Vitamin D deficiency      No past medical history pertinent negatives.  Past Surgical History:   Procedure Laterality Date    APPENDECTOMY      CHOLECYSTECTOMY      EAR TUBES      ENDOSCOPY  05/10/2010    EXPLORATORY LAPAROTOMY      LEFT OOPHORECTOMY      TUBAL ABDOMINAL LIGATION       History reviewed. No pertinent family history.  Social History     Socioeconomic History    Marital status:    Tobacco Use    Smoking status: Every Day     Packs/day: 1     Types: Cigarettes    Smokeless tobacco: Never   Substance and Sexual Activity    Alcohol use: Yes     Comment: occasional    Drug use: No     Exposure History:     Review of Systems See HPI.    Vital Signs:  /82 (BP Location: Left arm, Patient Position: Sitting, Cuff Size: Small Adult)   Pulse 83   Temp 98.1 øF (36.7 øC)   Ht 175.3 cm (69\")   Wt 72.3 kg (159 lb 6.4 oz)   LMP 06/01/2023 Comment: Patient reports ablation in June  SpO2 97%   BMI 23.54 kg/mý     Physical Exam  Vital signs - reviewed.  Alert, pleasant, no distress at this time  No oropharyngeal lesions, thrush, or exudate  Mucosa and oropharynx seemed appropriately moist  With careful exam I could find no anterior or posterior cervical adenopathy.  I could find no supraclavicular or axillary adenopathy.  I did not find any tenderness in the neck area  Trying to palpate carefully up near the angle of the jaw where she felt there may be some fullness I could not identify any discrete mass.  Both external ear canals are without any redness, swelling, or debris.  I was able to get good views of both tympanic membranes and they were pearly white without erythema it did not appear to have any significant effusion  Did not identify any thyroid mass    Lab/Imaging/Other Information:     US Thyroid 9/11/2023   IMPRESSION:      1.  TI-RADS 4 nodule in the right " thyroid lobe measures 1.2 cm, unchanged from prior. Continued attention on follow-up imaging recommended.     2.  Heterogeneous, hypervascular appearance of the thyroid gland diffusely, may indicate Graves' disease. Clinical correlation recommended.       Assessment is TI-RADS 4:  Moderately suspicious.     MANAGEMENT RECOMMENDATION:   1. FNA is recommended for nodules 1.5cm or more in greatest dimension.   2. For nodules 1.0 cm or greater, follow-up ultrasound should be performed at 1, 2, 3 and 5 years.     CT Head without contrast- 9/18/2023  FINDINGS:   The midline structures are nondisplaced. The ventricles and basilar  cisterns are normal in size and configuration. There is no evidence of  intracranial hemorrhage or mass-effect. The gray-white matter  differentiation is maintained. The sulci have a normal configuration,  and there are no abnormal extra-axial fluid collections. The structures  of the posterior fossa are unremarkable.      The included orbits and their contents are unremarkable. The visualized  paranasal sinuses, mastoid air cells and middle ear cavities are clear.  The visualized osseous structures and overlying soft tissues of the  skull and face are unremarkable.      IMPRESSION:  1. No acute intracranial process.    CBC:  Lab Results   Component Value Date    WBC 12.80 (H) 09/18/2023    RBC 4.51 09/18/2023    HGB 14.6 09/18/2023    HCT 44.7 09/18/2023    RDWSD 48.5 09/18/2023    MPV 9.6 09/18/2023     09/18/2023      CMP  Lab Results - Last 18 Months   Lab Units 09/18/23  1851   CREATININE mg/dL 0.60   ALT (SGPT) U/L 19   AST (SGOT) U/L 15   BUN / CREAT RATIO  11.7   POTASSIUM mmol/L 3.7            Component  Ref Range & Units 8/19/2023     Lyme Total Antibody EIA  Negative Negative   Comment: Lyme antibodies not detected. Reflex testing is not indicated.  No laboratory evidence of infection with B. burgdorferi (Lyme disease).  Negative results may occur in patients recently infected  (less than  or equal to 14 days) with B. burgdorferi.  If recent infection is  suspected, repeat testing on a new sample collected in 7 to 14 days is  recommended.   Resulting Agency [facility]        Impression & Recommendations:   Diagnoses and all orders for this visit:    1. Fatigue, unspecified type (Primary)    Overall her symptoms have been improved over the past 10 days to 2 weeks.  In addition to some fatigue she had had sense of adenopathy on the right and also some intermittent soreness or fullness involving the right upper chest and right arm area.  Currently her physical exam seems to be normal.  In terms of laboratory work she recently had a white blood cell count that was mildly elevated, but she may have been on prednisone at that time.  She was able to show me on MyChart recent TSH that was normal.  I offered CT of the neck with contrast and CT of the chest with contrast to further evaluate for any adenopathy or mass.  Since she is symptom-free at this point without any findings on exam, both of us felt we could hold off and follow her clinical course at present.  Because of her complaints of fatigue that she was experiencing previously going to check CBC, CK and also CRP.  She is going to keep her appointment with ENT and also with rheumatology.  At this point I am leaning against any infection.  Going to reassess in 3 weeks to make sure no new symptoms, review her interval work-up, and reassess for the possibility of infection.  She was comfortable with that plan and will call for earlier appointment if any new or worsening symptoms in the interim.    Follow Up:     There are no Patient Instructions on file for this visit.  Return in about 3 weeks (around 10/30/2023).  Patient was provided After Visit Summary.     Shen Judd MD    CC: FREDDY Alvarez

## 2023-10-09 ENCOUNTER — OFFICE VISIT (OUTPATIENT)
Age: 43
End: 2023-10-09
Payer: MEDICARE

## 2023-10-09 VITALS
HEIGHT: 69 IN | WEIGHT: 159.4 LBS | BODY MASS INDEX: 23.61 KG/M2 | OXYGEN SATURATION: 97 % | TEMPERATURE: 98.1 F | HEART RATE: 83 BPM | DIASTOLIC BLOOD PRESSURE: 82 MMHG | SYSTOLIC BLOOD PRESSURE: 123 MMHG

## 2023-10-09 DIAGNOSIS — R53.83 FATIGUE, UNSPECIFIED TYPE: Primary | ICD-10-CM

## 2023-10-09 PROCEDURE — 1160F RVW MEDS BY RX/DR IN RCRD: CPT | Performed by: INTERNAL MEDICINE

## 2023-10-09 PROCEDURE — 1159F MED LIST DOCD IN RCRD: CPT | Performed by: INTERNAL MEDICINE

## 2023-10-09 PROCEDURE — 99204 OFFICE O/P NEW MOD 45 MIN: CPT | Performed by: INTERNAL MEDICINE

## 2023-10-09 RX ORDER — LEVOCETIRIZINE DIHYDROCHLORIDE 5 MG/1
1 TABLET, FILM COATED ORAL DAILY
COMMUNITY

## 2023-10-09 RX ORDER — AZELASTINE HCL 205.5 UG/1
SPRAY NASAL
COMMUNITY

## 2023-10-09 RX ORDER — HYDROXYCHLOROQUINE SULFATE 200 MG/1
200 TABLET, FILM COATED ORAL DAILY
COMMUNITY

## 2023-10-09 RX ORDER — DESVENLAFAXINE 100 MG/1
100 TABLET, EXTENDED RELEASE ORAL DAILY
COMMUNITY

## 2023-10-09 RX ORDER — CYANOCOBALAMIN 1000 UG/ML
INJECTION, SOLUTION INTRAMUSCULAR; SUBCUTANEOUS
COMMUNITY
Start: 2023-07-12

## 2023-10-09 RX ORDER — IBUPROFEN 800 MG/1
TABLET ORAL
COMMUNITY
Start: 2023-06-16

## 2023-10-09 RX ORDER — METHYLPREDNISOLONE ACETATE 40 MG/ML
INJECTION, SUSPENSION INTRA-ARTICULAR; INTRALESIONAL; INTRAMUSCULAR; SOFT TISSUE
COMMUNITY
Start: 2023-08-18

## 2023-10-09 RX ORDER — TRAZODONE HYDROCHLORIDE 100 MG/1
1 TABLET ORAL
COMMUNITY

## 2023-10-10 ENCOUNTER — LAB (OUTPATIENT)
Dept: LAB | Facility: HOSPITAL | Age: 43
End: 2023-10-10
Payer: MEDICARE

## 2023-10-10 DIAGNOSIS — R53.83 FATIGUE, UNSPECIFIED TYPE: ICD-10-CM

## 2023-10-10 LAB
BASOPHILS # BLD AUTO: 0.04 10*3/MM3 (ref 0–0.2)
BASOPHILS NFR BLD AUTO: 0.4 % (ref 0–1.5)
CK SERPL-CCNC: 110 U/L (ref 20–180)
CRP SERPL-MCNC: 0.31 MG/DL (ref 0–0.5)
DEPRECATED RDW RBC AUTO: 47.1 FL (ref 37–54)
EOSINOPHIL # BLD AUTO: 0.27 10*3/MM3 (ref 0–0.4)
EOSINOPHIL NFR BLD AUTO: 2.9 % (ref 0.3–6.2)
ERYTHROCYTE [DISTWIDTH] IN BLOOD BY AUTOMATED COUNT: 13 % (ref 12.3–15.4)
HCT VFR BLD AUTO: 46.6 % (ref 34–46.6)
HGB BLD-MCNC: 15.4 G/DL (ref 12–15.9)
IMM GRANULOCYTES # BLD AUTO: 0.03 10*3/MM3 (ref 0–0.05)
IMM GRANULOCYTES NFR BLD AUTO: 0.3 % (ref 0–0.5)
LYMPHOCYTES # BLD AUTO: 2.41 10*3/MM3 (ref 0.7–3.1)
LYMPHOCYTES NFR BLD AUTO: 26.3 % (ref 19.6–45.3)
MCH RBC QN AUTO: 32.4 PG (ref 26.6–33)
MCHC RBC AUTO-ENTMCNC: 33 G/DL (ref 31.5–35.7)
MCV RBC AUTO: 98.1 FL (ref 79–97)
MONOCYTES # BLD AUTO: 0.61 10*3/MM3 (ref 0.1–0.9)
MONOCYTES NFR BLD AUTO: 6.7 % (ref 5–12)
NEUTROPHILS NFR BLD AUTO: 5.81 10*3/MM3 (ref 1.7–7)
NEUTROPHILS NFR BLD AUTO: 63.4 % (ref 42.7–76)
NRBC BLD AUTO-RTO: 0 /100 WBC (ref 0–0.2)
PLATELET # BLD AUTO: 357 10*3/MM3 (ref 140–450)
PMV BLD AUTO: 9.9 FL (ref 6–12)
RBC # BLD AUTO: 4.75 10*6/MM3 (ref 3.77–5.28)
WBC NRBC COR # BLD: 9.17 10*3/MM3 (ref 3.4–10.8)

## 2023-10-10 PROCEDURE — 86140 C-REACTIVE PROTEIN: CPT

## 2023-10-10 PROCEDURE — 85025 COMPLETE CBC W/AUTO DIFF WBC: CPT

## 2023-10-10 PROCEDURE — 36415 COLL VENOUS BLD VENIPUNCTURE: CPT

## 2023-10-10 PROCEDURE — 82550 ASSAY OF CK (CPK): CPT

## 2023-10-16 ENCOUNTER — OFFICE VISIT (OUTPATIENT)
Dept: ENT CLINIC | Age: 43
End: 2023-10-16
Payer: MEDICARE

## 2023-10-16 VITALS
WEIGHT: 159 LBS | DIASTOLIC BLOOD PRESSURE: 74 MMHG | SYSTOLIC BLOOD PRESSURE: 126 MMHG | BODY MASS INDEX: 28.17 KG/M2 | HEIGHT: 63 IN

## 2023-10-16 DIAGNOSIS — H65.91 MEE (MIDDLE EAR EFFUSION), RIGHT: Primary | ICD-10-CM

## 2023-10-16 DIAGNOSIS — B37.0 ORAL THRUSH: ICD-10-CM

## 2023-10-16 DIAGNOSIS — R49.0 HOARSENESS OF VOICE: ICD-10-CM

## 2023-10-16 PROCEDURE — G8417 CALC BMI ABV UP PARAM F/U: HCPCS | Performed by: PHYSICIAN ASSISTANT

## 2023-10-16 PROCEDURE — 4004F PT TOBACCO SCREEN RCVD TLK: CPT | Performed by: PHYSICIAN ASSISTANT

## 2023-10-16 PROCEDURE — G8427 DOCREV CUR MEDS BY ELIG CLIN: HCPCS | Performed by: PHYSICIAN ASSISTANT

## 2023-10-16 PROCEDURE — 31575 DIAGNOSTIC LARYNGOSCOPY: CPT | Performed by: PHYSICIAN ASSISTANT

## 2023-10-16 PROCEDURE — G8484 FLU IMMUNIZE NO ADMIN: HCPCS | Performed by: PHYSICIAN ASSISTANT

## 2023-10-16 PROCEDURE — 99203 OFFICE O/P NEW LOW 30 MIN: CPT | Performed by: PHYSICIAN ASSISTANT

## 2023-10-16 RX ORDER — FLUCONAZOLE 150 MG/1
150 TABLET ORAL
Qty: 2 TABLET | Refills: 0 | Status: SHIPPED | OUTPATIENT
Start: 2023-10-16 | End: 2023-10-22

## 2023-10-16 RX ORDER — PREDNISONE 20 MG/1
TABLET ORAL
Qty: 20 TABLET | Refills: 0 | Status: SHIPPED | OUTPATIENT
Start: 2023-10-16

## 2023-10-16 RX ORDER — PSEUDOEPHEDRINE HCL 30 MG
30 TABLET ORAL 3 TIMES DAILY
Qty: 30 TABLET | Refills: 3 | Status: SHIPPED | OUTPATIENT
Start: 2023-10-16 | End: 2024-10-15

## 2023-10-16 RX ORDER — CLINDAMYCIN HYDROCHLORIDE 300 MG/1
300 CAPSULE ORAL 3 TIMES DAILY
Qty: 30 CAPSULE | Refills: 0 | Status: SHIPPED | OUTPATIENT
Start: 2023-10-16 | End: 2023-10-26

## 2023-10-16 ASSESSMENT — ENCOUNTER SYMPTOMS
VOICE CHANGE: 0
SINUS PAIN: 0
TROUBLE SWALLOWING: 0
SORE THROAT: 0
RHINORRHEA: 0
SINUS PRESSURE: 0
EYE DISCHARGE: 0
FACIAL SWELLING: 0
EYE PAIN: 0

## 2023-10-16 NOTE — PROGRESS NOTES
FLEXIBLE DIAGNOSTIC     The nares were anesthetized with 4% lidocaine aerosol bilaterally. Each nare was individually evaluate where the patient was found to have no evidence of nasal polyps or masses. The right side was utilized for the full procedure. The posterior wall of the nasopharyngeal region demonstrated some postnasal drip present with no other abnormalities. The scope was then advanced to the oropharyngeal region where the patient was found to have normal swallowing with no masses or any erythematous changes. The scope was then advanced to the epiglottis where the vocal cords were well visualized. The vocal cords were symmetrical and fully functional with no edema or erythema. The glottis was mobile with no erythematous changes. No masses were noted at this level. The tongue was extended demonstrated no masses to be present but she was noted to have evidence of thrush that appeared to be fairly early to the posterior surface. No additional findings was grossly noted. Patient tolerated the procedure nicely with no complications. Orders Placed This Encounter   Medications    clindamycin (CLEOCIN) 300 MG capsule     Sig: Take 1 capsule by mouth 3 times daily for 10 days     Dispense:  30 capsule     Refill:  0    predniSONE (DELTASONE) 20 MG tablet     Sig: Take 1 tab po bid x 6 days, decrease to 1 tab po q d x 4 days, then decrease to half tablet po q d x 2 days.      Dispense:  20 tablet     Refill:  0    fluconazole (DIFLUCAN) 150 MG tablet     Sig: Take 1 tablet by mouth every 72 hours for 6 days     Dispense:  2 tablet     Refill:  0    nystatin (MYCOSTATIN) 831724 UNIT/ML suspension     Sig: Take 5 mLs by mouth 4 times daily for 10 days Gargle for 2 to 3 minutes and then swallow-start after antibiotic is completed     Dispense:  200 mL     Refill:  0    pseudoephedrine (DECONGESTANT) 30 MG tablet     Sig: Take 1 tablet by mouth 3 times daily     Dispense:  30 tablet     Refill:  3

## 2023-10-16 NOTE — ASSESSMENT & PLAN NOTE
Right middle ear effusion-noted today on microscopy  Plan: I will place the patient on prednisone, sudafed, as well as 10 days of clindamycin. She is to follow-up in 2 weeks for reevaluation.

## 2023-10-16 NOTE — ASSESSMENT & PLAN NOTE
Oral thrush-noted today on laryngoscopy  Plan: I advised the patient that when she completes her antibiotics for her middle ear effusion, she is to start her Mycostatin swish and swallow as well as her Diflucan pills.

## 2023-10-19 ENCOUNTER — PATIENT ROUNDING (BHMG ONLY) (OUTPATIENT)
Age: 43
End: 2023-10-19
Payer: MEDICARE

## 2023-10-19 NOTE — PROGRESS NOTES
October 19, 2023    Hello, may I speak with Leatha Goldman?    My name is Manju      I am  with [facility] [place] [facility]FECTI[facility]EASES  [facility] [facility] [address] [phone].    Before we get started may I verify your date of birth? 1980    I am calling to officially welcome you to our practice and ask about your recent visit. Is this a good time to talk? no    Tell me about your visit with us. What things went well?  Called and received voicemail message.        We're always looking for ways to make our patients' experiences even better. Do you have recommendations on ways we may improve?  no    Overall were you satisfied with your first visit to our practice? yes       I appreciate you taking the time to speak with me today. Is there anything else I can do for you? no      Thank you, and have a great day.

## 2023-10-30 NOTE — PROGRESS NOTES
[facility] [place]ious Diseases Progress Note    Patient:  Leatha Goldman  YOB: 1980  MRN: [id number]   Primary Care Physician: Jasmine Vuong APRN  Referring Physician: Jasmine Vuong APRN     Chief Complaint:   Chief Complaint   Patient presents with    fatigue and mildly elevated WBC     With no complaints at this time     Interval History/HPI: She is here today for follow-up.  When I last saw her the main issue was fatigue.  She indicates she saw ENT after her last appointment with me.  She indicates she was again diagnosed with an ear infection.  She could not believe she had an ear infection at that time.  She was having no symptoms.  She was not having pain or discomfort.  She said at times she can notice some fluid type fullness in the ear, but no other symptoms.  She indicates she received antibiotic treatment.  She indicates she had an episode of thrush related to her antibiotics.  She indicates the thrush was not visible on her tongue or buccal mucosa, but was seen by ENT in the back of her throat when she had an NP scope.  She is symptom-free currently.  She has no ear pain or discomfort.  She does not have any fullness in the ear.  She has not felt as if she has thrush.  She does indicate her mouth is dry at times.  She does smoke 1/2 pack of cigarettes per day.  She indicates she saw a rheumatology in [place].  She indicates they thought everything checked out okay from an autoimmune standpoint, but they did do some other blood work which she will get the results of at her next visit.  She was hoping to hear that there was no evidence of any infection or problem at today's appointment.  She has not noticed any lymphadenopathy.    Allergies:   No Known Allergies    Current Scheduled Medications:   Current Outpatient Medications on File Prior to Visit   Medication Sig    busPIRone (BUSPAR) 15 MG tablet Take 1 tablet by mouth 2 (Two) Times a Day.    cyanocobalamin 1000 MCG/ML  "injection INJECT 1 ML SUBCUTANEOUSLY EVERY MONTH    desvenlafaxine (PRISTIQ) 100 MG 24 hr tablet Take 1 tablet by mouth Daily.    dicyclomine (BENTYL) 20 MG tablet Take 1 tablet by mouth Every 6 (Six) Hours. prn    famotidine (PEPCID) 20 MG tablet Take 1 tablet by mouth 2 (Two) Times a Day. prn    furosemide (LASIX) 20 MG tablet Take 1 tablet by mouth Daily.    hydroxychloroquine (PLAQUENIL) 200 MG tablet Take 1 tablet by mouth Daily.    rizatriptan MLT (MAXALT-MLT) 5 MG disintegrating tablet Take 1 tablet by mouth 1 (One) Time As Needed for Migraine. May repeat in 2 hours if needed    tiZANidine (ZANAFLEX) 4 MG tablet Take 1 tablet by mouth 2 (Two) Times a Day.    vitamin D (ERGOCALCIFEROL) 86207 UNITS capsule capsule Take 1 capsule by mouth 1 (One) Time Per Week.    azelastine (ASTEPRO) 0.15 % solution nasal spray USE 2 SPRAYS TWICE A DAY AS NEEDED (Patient not taking: Reported on 11/1/2023)    ibuprofen (ADVIL,MOTRIN) 800 MG tablet  (Patient not taking: Reported on 11/1/2023)    levocetirizine (XYZAL) 5 MG tablet Take 1 tablet by mouth Daily. (Patient not taking: Reported on 11/1/2023)    traZODone (DESYREL) 100 MG tablet Take 1 tablet by mouth every night at bedtime. (Patient not taking: Reported on 11/1/2023)    [DISCONTINUED] DULoxetine (CYMBALTA) 60 MG capsule Take 60 mg by mouth Daily.    [DISCONTINUED] levothyroxine (SYNTHROID, LEVOTHROID) 75 MCG tablet Take 75 mcg by mouth Daily.    [DISCONTINUED] methylPREDNISolone acetate (DEPO-medrol) 40 MG/ML injection Take 1 mL by injection route.     No current facility-administered medications on file prior to visit.      Venous Access Review  Line/IV site: No current IV Access    Antimicrobial Review  Currently on antibiotics/antifungals: YES/NO: NO    Review of Systems See HPI.    Vital Signs:  /70 Comment: manual  Pulse 77   Temp 97.8 °F (36.6 °C) (Temporal)   Ht 175.3 cm (69\")   Wt 71.2 kg (157 lb)   SpO2 98%   BMI 23.18 kg/m²     Physical " Exam  Vital signs - reviewed.  Both tympanic membranes are pearly gray.  There was no erythema.  There was no sign of ear effusion.  She had a normal cone of light reflex in both ears.  Examination of the oropharynx shows no evidence of thrush or ulcerations on her tongue, under the tongue, on the buccal mucosa or in the posterior oropharynx.  Saliva appeared to be normal.  Her oral exam was normal.  On careful exam I could find no anterior or posterior cervical adenopathy.  I could find no supraclavicular, axillary, or inguinal adenopathy.    Lab/Imaging/Other Information: No new results    Impression & Recommendations:   Diagnoses and all orders for this visit:    1. Fatigue, unspecified type (Primary)    At this point I find no evidence of ongoing infection.  She indicates her energy level has improved.  She does not describe fatigue at today's visit.  I do not find any evidence of ear infection.  I do not find any evidence of thrush at present.  I could find no adenopathy.  At this point I think she looks to be doing very well from an infectious disease standpoint.  I would be happy to reassess if any recurrent symptoms or concerns.  She can otherwise keep follow-up with her primary clinician Jasmine Vuong, ENT, and rheumatology.  She can follow-up with infectious diseases as needed.  She was comfortable with that approach.    Follow Up:   Patient Instructions   Dr Judd recommends smoking cessation.   Return if symptoms worsen or fail to improve.  Patient was provided After Visit Summary.     Shen Judd MD    CC: FREDDY Alvarez

## 2023-11-01 ENCOUNTER — OFFICE VISIT (OUTPATIENT)
Age: 43
End: 2023-11-01
Payer: MEDICARE

## 2023-11-01 VITALS
OXYGEN SATURATION: 98 % | BODY MASS INDEX: 23.25 KG/M2 | SYSTOLIC BLOOD PRESSURE: 104 MMHG | HEIGHT: 69 IN | HEART RATE: 77 BPM | TEMPERATURE: 97.8 F | WEIGHT: 157 LBS | DIASTOLIC BLOOD PRESSURE: 70 MMHG

## 2023-11-01 DIAGNOSIS — R53.83 FATIGUE, UNSPECIFIED TYPE: Primary | ICD-10-CM

## 2023-11-14 ENCOUNTER — OFFICE VISIT (OUTPATIENT)
Dept: OBGYN CLINIC | Age: 43
End: 2023-11-14

## 2023-11-14 VITALS
DIASTOLIC BLOOD PRESSURE: 78 MMHG | SYSTOLIC BLOOD PRESSURE: 112 MMHG | HEIGHT: 63 IN | WEIGHT: 156 LBS | HEART RATE: 86 BPM | BODY MASS INDEX: 27.64 KG/M2

## 2023-11-14 DIAGNOSIS — Z11.51 SCREENING FOR HUMAN PAPILLOMAVIRUS (HPV): ICD-10-CM

## 2023-11-14 DIAGNOSIS — Z12.31 ENCOUNTER FOR SCREENING MAMMOGRAM FOR MALIGNANT NEOPLASM OF BREAST: ICD-10-CM

## 2023-11-14 DIAGNOSIS — Z12.4 CERVICAL CANCER SCREENING: ICD-10-CM

## 2023-11-14 DIAGNOSIS — Z01.419 WELL WOMAN EXAM WITH ROUTINE GYNECOLOGICAL EXAM: Primary | ICD-10-CM

## 2023-11-14 DIAGNOSIS — N63.11 BREAST LUMP ON RIGHT SIDE AT 10 O'CLOCK POSITION: ICD-10-CM

## 2023-11-16 ENCOUNTER — OFFICE VISIT (OUTPATIENT)
Dept: ENT CLINIC | Age: 43
End: 2023-11-16
Payer: MEDICARE

## 2023-11-16 VITALS
BODY MASS INDEX: 27.64 KG/M2 | HEIGHT: 63 IN | SYSTOLIC BLOOD PRESSURE: 110 MMHG | WEIGHT: 156 LBS | DIASTOLIC BLOOD PRESSURE: 68 MMHG

## 2023-11-16 DIAGNOSIS — M26.621 ARTHRALGIA OF RIGHT TEMPOROMANDIBULAR JOINT: ICD-10-CM

## 2023-11-16 DIAGNOSIS — H65.91 MEE (MIDDLE EAR EFFUSION), RIGHT: Primary | ICD-10-CM

## 2023-11-16 DIAGNOSIS — B37.0 ORAL THRUSH: ICD-10-CM

## 2023-11-16 PROCEDURE — 99213 OFFICE O/P EST LOW 20 MIN: CPT | Performed by: PHYSICIAN ASSISTANT

## 2023-11-16 PROCEDURE — G8417 CALC BMI ABV UP PARAM F/U: HCPCS | Performed by: PHYSICIAN ASSISTANT

## 2023-11-16 PROCEDURE — G8427 DOCREV CUR MEDS BY ELIG CLIN: HCPCS | Performed by: PHYSICIAN ASSISTANT

## 2023-11-16 PROCEDURE — G8484 FLU IMMUNIZE NO ADMIN: HCPCS | Performed by: PHYSICIAN ASSISTANT

## 2023-11-16 PROCEDURE — 4004F PT TOBACCO SCREEN RCVD TLK: CPT | Performed by: PHYSICIAN ASSISTANT

## 2023-11-16 NOTE — PROGRESS NOTES
Daija is a pleasant 78-year-old  female who presents for a 2-week follow-up after treatment for right middle ear effusion with evidence of otitis media. She reports that she is still having pain to the right ear but reports that her hearing seems to be normal.  She reports that the ear is very sore when being manipulated. Physical examination with the microscope revealed the patient to have a normal-appearing TM and canal to the right ear. The middle ear effusion and infection was noted be resolved. Examination the right ear demonstrated normal TM and canal.  Patient was noted with pain with manipulation of the right ear. Palpation of the neck and jaw demonstrated obvious evidence of right TMJ arthralgia with no evidence of dislocation. Overall this was felt to be the etiology of her right ear pain. Oral exam demonstrated the patient to have a missing molar to the lower left back molar that was felt to represent imbalance causing some of the TMJ issues. Patient admits to grinding her teeth at night. Examination of the tongue demonstrated no evidence of thrush confirmed by scraping with tongue blade. Neck exam demonstrated no lymphadenopathy or thyromegaly. Impression: Resolved right middle ear effusion with low-grade otitis media, right jaw pain and ear pain secondary to TMJ arthralgia with no evidence of dislocation      Plan: I recommended the patient to utilize a warm moist heating pad to the right jaw and to also utilize an oral bite block due to the imbalance. She is currently scheduled to see Dr. Reema Merritt for possible Botox injection of the area. I advised the patient that if this continues, would recommend referring her to Dr. Arya Youngblood for further interventions. At this point the patient is to follow-up with me as needed. She is reminded to call if she has any questions.       Electronically signed by Matt Boyle PA-C on 11/16/23 at 11:39 AM CST

## 2023-11-18 LAB
HPV HR 12 DNA SPEC QL NAA+PROBE: NOT DETECTED
HPV16 DNA SPEC QL NAA+PROBE: NOT DETECTED
HPV16+18+H RISK 12 DNA SPEC-IMP: NORMAL
HPV18 DNA SPEC QL NAA+PROBE: NOT DETECTED

## 2023-11-21 ENCOUNTER — OFFICE VISIT (OUTPATIENT)
Dept: NEUROLOGY | Age: 43
End: 2023-11-21
Payer: MEDICARE

## 2023-11-21 ENCOUNTER — TELEPHONE (OUTPATIENT)
Dept: ENT CLINIC | Age: 43
End: 2023-11-21

## 2023-11-21 VITALS
HEART RATE: 73 BPM | SYSTOLIC BLOOD PRESSURE: 111 MMHG | DIASTOLIC BLOOD PRESSURE: 80 MMHG | BODY MASS INDEX: 27.64 KG/M2 | HEIGHT: 63 IN | WEIGHT: 156 LBS

## 2023-11-21 DIAGNOSIS — G43.909 MIGRAINE WITHOUT STATUS MIGRAINOSUS, NOT INTRACTABLE, UNSPECIFIED MIGRAINE TYPE: Primary | ICD-10-CM

## 2023-11-21 DIAGNOSIS — M26.621 ARTHRALGIA OF RIGHT TEMPOROMANDIBULAR JOINT: Primary | ICD-10-CM

## 2023-11-21 DIAGNOSIS — M54.2 PAIN, NECK: ICD-10-CM

## 2023-11-21 DIAGNOSIS — H53.149 PHOTOPHOBIA: ICD-10-CM

## 2023-11-21 DIAGNOSIS — M79.7 FIBROMYALGIA: ICD-10-CM

## 2023-11-21 PROCEDURE — G8427 DOCREV CUR MEDS BY ELIG CLIN: HCPCS | Performed by: PSYCHIATRY & NEUROLOGY

## 2023-11-21 PROCEDURE — G8417 CALC BMI ABV UP PARAM F/U: HCPCS | Performed by: PSYCHIATRY & NEUROLOGY

## 2023-11-21 PROCEDURE — 4004F PT TOBACCO SCREEN RCVD TLK: CPT | Performed by: PSYCHIATRY & NEUROLOGY

## 2023-11-21 PROCEDURE — 99203 OFFICE O/P NEW LOW 30 MIN: CPT | Performed by: PSYCHIATRY & NEUROLOGY

## 2023-11-21 PROCEDURE — G8484 FLU IMMUNIZE NO ADMIN: HCPCS | Performed by: PSYCHIATRY & NEUROLOGY
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
## 2023-11-21 NOTE — TELEPHONE ENCOUNTER
Patient states she discussed with doroteo cormier about a ref for oral surgeon botox Dr. Weldon and she said she wanted to hold off and check with Dr. Wilson first and Dr. Wilson told her he wont do botox for TMJ so she would like Doroteo to go ahead and put referral for Dr. Weldon.     Thank you

## 2023-11-22 NOTE — PROGRESS NOTES
Orders placed for oral surgery for TMJ.     Electronically signed by Cindy Weldon PA-C on 11/21/23 at 6:06 PM CST

## 2024-03-07 ENCOUNTER — TRANSCRIBE ORDERS (OUTPATIENT)
Dept: ADMINISTRATIVE | Facility: HOSPITAL | Age: 44
End: 2024-03-07
Payer: MEDICARE

## 2024-03-07 DIAGNOSIS — Z13.220 ENCOUNTER FOR SCREENING FOR LIPOID DISORDERS: Primary | ICD-10-CM

## 2024-03-07 DIAGNOSIS — Z13.1 ENCOUNTER FOR SCREENING FOR DIABETES MELLITUS: ICD-10-CM

## 2024-03-07 DIAGNOSIS — E55.9 VITAMIN D DEFICIENCY, UNSPECIFIED: ICD-10-CM

## 2024-03-07 DIAGNOSIS — E53.8 DEFICIENCY OF OTHER SPECIFIED B GROUP VITAMINS: ICD-10-CM

## 2024-03-07 DIAGNOSIS — Z13.29 ENCOUNTER FOR SCREENING FOR OTHER SUSPECTED ENDOCRINE DISORDER: ICD-10-CM

## 2024-03-08 ENCOUNTER — LAB (OUTPATIENT)
Dept: LAB | Facility: HOSPITAL | Age: 44
End: 2024-03-08
Payer: MEDICARE

## 2024-03-08 DIAGNOSIS — Z13.1 ENCOUNTER FOR SCREENING FOR DIABETES MELLITUS: ICD-10-CM

## 2024-03-08 DIAGNOSIS — Z13.29 ENCOUNTER FOR SCREENING FOR OTHER SUSPECTED ENDOCRINE DISORDER: ICD-10-CM

## 2024-03-08 DIAGNOSIS — Z13.220 ENCOUNTER FOR SCREENING FOR LIPOID DISORDERS: ICD-10-CM

## 2024-03-08 DIAGNOSIS — E55.9 VITAMIN D DEFICIENCY, UNSPECIFIED: ICD-10-CM

## 2024-03-08 DIAGNOSIS — E53.8 DEFICIENCY OF OTHER SPECIFIED B GROUP VITAMINS: ICD-10-CM

## 2024-03-08 LAB
25(OH)D3 SERPL-MCNC: 50.1 NG/ML (ref 30–100)
CHOLEST SERPL-MCNC: 176 MG/DL (ref 130–200)
FOLATE SERPL-MCNC: 6.14 NG/ML (ref 4.78–24.2)
HBA1C MFR BLD: 5.6 % (ref 4.8–5.9)
HDLC SERPL-MCNC: 56 MG/DL
LDLC SERPL CALC-MCNC: 98 MG/DL (ref 0–99)
LDLC/HDLC SERPL: 1.7 {RATIO}
T3FREE SERPL-MCNC: 3.46 PG/ML (ref 2–4.4)
T4 FREE SERPL-MCNC: 1.25 NG/DL (ref 0.93–1.7)
TRIGL SERPL-MCNC: 124 MG/DL (ref 0–149)
TSH SERPL DL<=0.05 MIU/L-ACNC: 0.37 UIU/ML (ref 0.27–4.2)
VIT B12 BLD-MCNC: 855 PG/ML (ref 211–946)
VLDLC SERPL-MCNC: 22 MG/DL (ref 5–40)

## 2024-03-08 PROCEDURE — 82746 ASSAY OF FOLIC ACID SERUM: CPT

## 2024-03-08 PROCEDURE — 84443 ASSAY THYROID STIM HORMONE: CPT

## 2024-03-08 PROCEDURE — 83036 HEMOGLOBIN GLYCOSYLATED A1C: CPT

## 2024-03-08 PROCEDURE — 84439 ASSAY OF FREE THYROXINE: CPT

## 2024-03-08 PROCEDURE — 80061 LIPID PANEL: CPT

## 2024-03-08 PROCEDURE — 82306 VITAMIN D 25 HYDROXY: CPT

## 2024-03-08 PROCEDURE — 84481 FREE ASSAY (FT-3): CPT

## 2024-03-08 PROCEDURE — 82607 VITAMIN B-12: CPT

## 2024-03-08 PROCEDURE — 36415 COLL VENOUS BLD VENIPUNCTURE: CPT

## 2024-03-19 ENCOUNTER — TRANSCRIBE ORDERS (OUTPATIENT)
Dept: ADMINISTRATIVE | Facility: HOSPITAL | Age: 44
End: 2024-03-19
Payer: MEDICARE

## 2024-03-19 DIAGNOSIS — B27.00 GAMMAHERPESVIRAL MONONUCLEOSIS WITHOUT COMPLICATION: Primary | ICD-10-CM

## 2024-03-19 DIAGNOSIS — R53.81 OTHER MALAISE: ICD-10-CM

## 2024-03-19 DIAGNOSIS — E03.9 HYPOTHYROIDISM, UNSPECIFIED TYPE: ICD-10-CM

## 2024-03-20 ENCOUNTER — TRANSCRIBE ORDERS (OUTPATIENT)
Dept: ADMINISTRATIVE | Facility: HOSPITAL | Age: 44
End: 2024-03-20
Payer: MEDICARE

## 2024-03-20 ENCOUNTER — LAB (OUTPATIENT)
Dept: LAB | Facility: HOSPITAL | Age: 44
End: 2024-03-20
Payer: MEDICARE

## 2024-03-20 DIAGNOSIS — B27.00 GAMMAHERPESVIRAL MONONUCLEOSIS WITHOUT COMPLICATION: ICD-10-CM

## 2024-03-20 DIAGNOSIS — E83.42 HYPOMAGNESEMIA: ICD-10-CM

## 2024-03-20 DIAGNOSIS — E03.9 HYPOTHYROIDISM, UNSPECIFIED TYPE: ICD-10-CM

## 2024-03-20 DIAGNOSIS — D50.9 IRON DEFICIENCY ANEMIA, UNSPECIFIED IRON DEFICIENCY ANEMIA TYPE: ICD-10-CM

## 2024-03-20 DIAGNOSIS — I10 ESSENTIAL HYPERTENSION: ICD-10-CM

## 2024-03-20 DIAGNOSIS — R53.81 OTHER MALAISE: Primary | ICD-10-CM

## 2024-03-20 DIAGNOSIS — R53.81 OTHER MALAISE: ICD-10-CM

## 2024-03-20 LAB
ALBUMIN SERPL-MCNC: 4.6 G/DL (ref 3.5–5.2)
ALBUMIN/GLOB SERPL: 1.8 G/DL
ALP SERPL-CCNC: 76 U/L (ref 39–117)
ALT SERPL W P-5'-P-CCNC: 38 U/L (ref 1–33)
ANION GAP SERPL CALCULATED.3IONS-SCNC: 11 MMOL/L (ref 5–15)
AST SERPL-CCNC: 19 U/L (ref 1–32)
B PARAPERT DNA SPEC QL NAA+PROBE: NOT DETECTED
B PERT DNA SPEC QL NAA+PROBE: NOT DETECTED
BASOPHILS # BLD AUTO: 0.05 10*3/MM3 (ref 0–0.2)
BASOPHILS NFR BLD AUTO: 0.5 % (ref 0–1.5)
BILIRUB SERPL-MCNC: 0.4 MG/DL (ref 0–1.2)
BILIRUB UR QL STRIP: NEGATIVE
BUN SERPL-MCNC: 10 MG/DL (ref 6–20)
BUN/CREAT SERPL: 14.9 (ref 7–25)
C PNEUM DNA NPH QL NAA+NON-PROBE: NOT DETECTED
CALCIUM SPEC-SCNC: 9.3 MG/DL (ref 8.6–10.5)
CHLORIDE SERPL-SCNC: 102 MMOL/L (ref 98–107)
CLARITY UR: CLEAR
CO2 SERPL-SCNC: 27 MMOL/L (ref 22–29)
COLOR UR: YELLOW
CREAT SERPL-MCNC: 0.67 MG/DL (ref 0.57–1)
DEPRECATED RDW RBC AUTO: 46.9 FL (ref 37–54)
EGFRCR SERPLBLD CKD-EPI 2021: 110.7 ML/MIN/1.73
EOSINOPHIL # BLD AUTO: 0.33 10*3/MM3 (ref 0–0.4)
EOSINOPHIL NFR BLD AUTO: 3.2 % (ref 0.3–6.2)
ERYTHROCYTE [DISTWIDTH] IN BLOOD BY AUTOMATED COUNT: 13.2 % (ref 12.3–15.4)
FERRITIN SERPL-MCNC: 38.07 NG/ML (ref 13–150)
FLUAV SUBTYP SPEC NAA+PROBE: NOT DETECTED
FLUBV RNA ISLT QL NAA+PROBE: NOT DETECTED
GLOBULIN UR ELPH-MCNC: 2.5 GM/DL
GLUCOSE SERPL-MCNC: 79 MG/DL (ref 65–99)
GLUCOSE UR STRIP-MCNC: NEGATIVE MG/DL
HADV DNA SPEC NAA+PROBE: NOT DETECTED
HCOV 229E RNA SPEC QL NAA+PROBE: NOT DETECTED
HCOV HKU1 RNA SPEC QL NAA+PROBE: NOT DETECTED
HCOV NL63 RNA SPEC QL NAA+PROBE: NOT DETECTED
HCOV OC43 RNA SPEC QL NAA+PROBE: NOT DETECTED
HCT VFR BLD AUTO: 45.9 % (ref 34–46.6)
HGB BLD-MCNC: 15.1 G/DL (ref 12–15.9)
HGB UR QL STRIP.AUTO: NEGATIVE
HMPV RNA NPH QL NAA+NON-PROBE: NOT DETECTED
HPIV1 RNA ISLT QL NAA+PROBE: NOT DETECTED
HPIV2 RNA SPEC QL NAA+PROBE: NOT DETECTED
HPIV3 RNA NPH QL NAA+PROBE: NOT DETECTED
HPIV4 P GENE NPH QL NAA+PROBE: NOT DETECTED
IMM GRANULOCYTES # BLD AUTO: 0.03 10*3/MM3 (ref 0–0.05)
IMM GRANULOCYTES NFR BLD AUTO: 0.3 % (ref 0–0.5)
IRON 24H UR-MRATE: 131 MCG/DL (ref 37–145)
IRON SATN MFR SERPL: 33 % (ref 20–50)
KETONES UR QL STRIP: NEGATIVE
LEUKOCYTE ESTERASE UR QL STRIP.AUTO: NEGATIVE
LYMPHOCYTES # BLD AUTO: 2.94 10*3/MM3 (ref 0.7–3.1)
LYMPHOCYTES NFR BLD AUTO: 28.8 % (ref 19.6–45.3)
M PNEUMO IGG SER IA-ACNC: NOT DETECTED
MAGNESIUM SERPL-MCNC: 2.2 MG/DL (ref 1.6–2.6)
MCH RBC QN AUTO: 31.9 PG (ref 26.6–33)
MCHC RBC AUTO-ENTMCNC: 32.9 G/DL (ref 31.5–35.7)
MCV RBC AUTO: 96.8 FL (ref 79–97)
MONOCYTES # BLD AUTO: 0.63 10*3/MM3 (ref 0.1–0.9)
MONOCYTES NFR BLD AUTO: 6.2 % (ref 5–12)
NEUTROPHILS NFR BLD AUTO: 6.22 10*3/MM3 (ref 1.7–7)
NEUTROPHILS NFR BLD AUTO: 61 % (ref 42.7–76)
NITRITE UR QL STRIP: NEGATIVE
NRBC BLD AUTO-RTO: 0 /100 WBC (ref 0–0.2)
PH UR STRIP.AUTO: 5.5 [PH] (ref 5–8)
PLATELET # BLD AUTO: 330 10*3/MM3 (ref 140–450)
PMV BLD AUTO: 9.5 FL (ref 6–12)
POTASSIUM SERPL-SCNC: 3.8 MMOL/L (ref 3.5–5.2)
PROT SERPL-MCNC: 7.1 G/DL (ref 6–8.5)
PROT UR QL STRIP: NEGATIVE
RBC # BLD AUTO: 4.74 10*6/MM3 (ref 3.77–5.28)
RHINOVIRUS RNA SPEC NAA+PROBE: NOT DETECTED
RSV RNA NPH QL NAA+NON-PROBE: NOT DETECTED
SARS-COV-2 RNA NPH QL NAA+NON-PROBE: NOT DETECTED
SODIUM SERPL-SCNC: 140 MMOL/L (ref 136–145)
SP GR UR STRIP: <=1.005 (ref 1–1.03)
T3FREE SERPL-MCNC: 3.23 PG/ML (ref 2–4.4)
T4 FREE SERPL-MCNC: 1.31 NG/DL (ref 0.93–1.7)
TIBC SERPL-MCNC: 392 MCG/DL (ref 298–536)
TRANSFERRIN SERPL-MCNC: 263 MG/DL (ref 200–360)
TSH SERPL DL<=0.05 MIU/L-ACNC: 0.75 UIU/ML (ref 0.27–4.2)
UROBILINOGEN UR QL STRIP: NORMAL
WBC NRBC COR # BLD AUTO: 10.2 10*3/MM3 (ref 3.4–10.8)

## 2024-03-20 PROCEDURE — 84482 T3 REVERSE: CPT

## 2024-03-20 PROCEDURE — 84466 ASSAY OF TRANSFERRIN: CPT

## 2024-03-20 PROCEDURE — 83540 ASSAY OF IRON: CPT

## 2024-03-20 PROCEDURE — 83735 ASSAY OF MAGNESIUM: CPT | Performed by: NURSE PRACTITIONER

## 2024-03-20 PROCEDURE — 85025 COMPLETE CBC W/AUTO DIFF WBC: CPT

## 2024-03-20 PROCEDURE — 84481 FREE ASSAY (FT-3): CPT

## 2024-03-20 PROCEDURE — 84439 ASSAY OF FREE THYROXINE: CPT

## 2024-03-20 PROCEDURE — 82728 ASSAY OF FERRITIN: CPT

## 2024-03-20 PROCEDURE — 80053 COMPREHEN METABOLIC PANEL: CPT | Performed by: NURSE PRACTITIONER

## 2024-03-20 PROCEDURE — 0202U NFCT DS 22 TRGT SARS-COV-2: CPT

## 2024-03-20 PROCEDURE — 84443 ASSAY THYROID STIM HORMONE: CPT

## 2024-03-20 PROCEDURE — 86645 CMV ANTIBODY IGM: CPT

## 2024-03-20 PROCEDURE — 86664 EPSTEIN-BARR NUCLEAR ANTIGEN: CPT

## 2024-03-20 PROCEDURE — 36415 COLL VENOUS BLD VENIPUNCTURE: CPT

## 2024-03-20 PROCEDURE — 86644 CMV ANTIBODY: CPT

## 2024-03-20 PROCEDURE — 86665 EPSTEIN-BARR CAPSID VCA: CPT

## 2024-03-20 PROCEDURE — 81003 URINALYSIS AUTO W/O SCOPE: CPT

## 2024-03-21 LAB
CMV IGG SERPL IA-ACNC: 4.9 U/ML (ref 0–0.59)
CMV IGM SERPL IA-ACNC: <30 AU/ML (ref 0–29.9)
EBV NA IGG SER IA-ACNC: >600 U/ML (ref 0–17.9)
EBV VCA IGG SER IA-ACNC: 70.4 U/ML (ref 0–17.9)
EBV VCA IGM SER IA-ACNC: <36 U/ML (ref 0–35.9)
SERVICE CMNT-IMP: ABNORMAL

## 2024-03-22 LAB — T3REVERSE SERPL-MCNC: 19.4 NG/DL (ref 9.2–24.1)

## 2024-06-18 ENCOUNTER — HOSPITAL ENCOUNTER (OUTPATIENT)
Age: 44
Discharge: HOME | End: 2024-06-20
Payer: MEDICARE

## 2024-06-18 VITALS
SYSTOLIC BLOOD PRESSURE: 105 MMHG | DIASTOLIC BLOOD PRESSURE: 73 MMHG | HEIGHT: 63 IN | BODY MASS INDEX: 29.23 KG/M2 | WEIGHT: 165 LBS

## 2024-06-18 DIAGNOSIS — R01.1 CARDIAC MURMUR, UNSPECIFIED: ICD-10-CM

## 2024-06-18 LAB
ECHO AO ASC DIAM: 2.1 CM
ECHO AO ASCENDING AORTA INDEX: 1.18 CM/M2
ECHO AO ROOT DIAM: 1.6 CM
ECHO AO ROOT INDEX: 0.9 CM/M2
ECHO AO SINUS VALSALVA DIAM: 2.6 CM
ECHO AO SINUS VALSALVA INDEX: 1.46 CM/M2
ECHO AO ST JNCT DIAM: 2.4 CM
ECHO AV AREA PEAK VELOCITY: 2.2 CM2
ECHO AV AREA VTI: 2.3 CM2
ECHO AV AREA/BSA PEAK VELOCITY: 1.2 CM2/M2
ECHO AV AREA/BSA VTI: 1.3 CM2/M2
ECHO AV MEAN GRADIENT: 3 MMHG
ECHO AV MEAN VELOCITY: 0.8 M/S
ECHO AV PEAK GRADIENT: 6 MMHG
ECHO AV PEAK VELOCITY: 1.2 M/S
ECHO AV VELOCITY RATIO: 0.67
ECHO AV VTI: 22.7 CM
ECHO BSA: 1.82 M2
ECHO EST RA PRESSURE: 3 MMHG
ECHO IVC PROX: 1.6 CM
ECHO LA AREA 2C: 8.2 CM2
ECHO LA AREA 4C: 9.6 CM2
ECHO LA DIAMETER INDEX: 1.69 CM/M2
ECHO LA DIAMETER: 3 CM
ECHO LA MAJOR AXIS: 4.5 CM
ECHO LA MINOR AXIS: 4.1 CM
ECHO LA TO AORTIC ROOT RATIO: 1.88
ECHO LA VOL BP: 15 ML (ref 22–52)
ECHO LA VOL MOD A2C: 13 ML (ref 22–52)
ECHO LA VOL MOD A4C: 16 ML (ref 22–52)
ECHO LA VOL/BSA BIPLANE: 8 ML/M2 (ref 16–34)
ECHO LA VOLUME INDEX MOD A2C: 7 ML/M2 (ref 16–34)
ECHO LA VOLUME INDEX MOD A4C: 9 ML/M2 (ref 16–34)
ECHO LV E' LATERAL VELOCITY: 6 CM/S
ECHO LV E' SEPTAL VELOCITY: 7 CM/S
ECHO LV EDV 3D: 96 ML
ECHO LV EDV INDEX 3D: 54 ML/M2
ECHO LV EJECTION FRACTION 3D: 55 %
ECHO LV ESV 3D: 46 ML
ECHO LV ESV INDEX 3D: 26 ML/M2
ECHO LV FRACTIONAL SHORTENING: 33 % (ref 28–44)
ECHO LV GLOBAL LONGITUDINAL STRAIN (GLS): -20.7 %
ECHO LV INTERNAL DIMENSION DIASTOLE INDEX: 2.42 CM/M2
ECHO LV INTERNAL DIMENSION DIASTOLIC: 4.3 CM (ref 3.9–5.3)
ECHO LV INTERNAL DIMENSION SYSTOLIC INDEX: 1.63 CM/M2
ECHO LV INTERNAL DIMENSION SYSTOLIC: 2.9 CM
ECHO LV IVSD: 1.1 CM (ref 0.6–0.9)
ECHO LV MASS 2D: 152.6 G (ref 67–162)
ECHO LV MASS 3D INDEX: 60.1 G/M2
ECHO LV MASS 3D: 107 G
ECHO LV MASS INDEX 2D: 85.7 G/M2 (ref 43–95)
ECHO LV POSTERIOR WALL DIASTOLIC: 1 CM (ref 0.6–0.9)
ECHO LV RELATIVE WALL THICKNESS RATIO: 0.47
ECHO LVOT AREA: 3.1 CM2
ECHO LVOT AV VTI INDEX: 0.74
ECHO LVOT DIAM: 2 CM
ECHO LVOT MEAN GRADIENT: 1 MMHG
ECHO LVOT PEAK GRADIENT: 3 MMHG
ECHO LVOT PEAK VELOCITY: 0.8 M/S
ECHO LVOT STROKE VOLUME INDEX: 29.6 ML/M2
ECHO LVOT SV: 52.8 ML
ECHO LVOT VTI: 16.8 CM
ECHO MV A VELOCITY: 0.5 M/S
ECHO MV AREA VTI: 2.8 CM2
ECHO MV E DECELERATION TIME (DT): 240 MS
ECHO MV E VELOCITY: 0.5 M/S
ECHO MV E/A RATIO: 1
ECHO MV E/E' LATERAL: 8.33
ECHO MV E/E' RATIO (AVERAGED): 7.74
ECHO MV E/E' SEPTAL: 7.14
ECHO MV LVOT VTI INDEX: 1.11
ECHO MV MAX VELOCITY: 0.6 M/S
ECHO MV MEAN GRADIENT: 1 MMHG
ECHO MV MEAN VELOCITY: 0.4 M/S
ECHO MV PEAK GRADIENT: 1 MMHG
ECHO MV VTI: 18.6 CM
ECHO PULMONARY ARTERY END DIASTOLIC PRESSURE: 2 MMHG
ECHO PV REGURGITANT MAX VELOCITY: 0.8 M/S
ECHO RA AREA 4C: 8.1 CM2
ECHO RA END SYSTOLIC VOLUME APICAL 4 CHAMBER INDEX BSA: 8 ML/M2
ECHO RA VOLUME: 14 ML
ECHO RIGHT VENTRICULAR SYSTOLIC PRESSURE (RVSP): 19 MMHG
ECHO RV INTERNAL DIMENSION: 2.9 CM
ECHO RV LONGITUDINAL DIMENSION: 3.7 CM
ECHO RV MID DIMENSION: 2.8 CM
ECHO RV TAPSE: 2 CM (ref 1.7–?)
ECHO TV REGURGITANT MAX VELOCITY: 2.01 M/S
ECHO TV REGURGITANT PEAK GRADIENT: 16 MMHG

## 2024-06-18 PROCEDURE — 93306 TTE W/DOPPLER COMPLETE: CPT

## 2024-08-16 ENCOUNTER — TRANSCRIBE ORDERS (OUTPATIENT)
Dept: ADMINISTRATIVE | Facility: HOSPITAL | Age: 44
End: 2024-08-16
Payer: MEDICARE

## 2024-08-16 ENCOUNTER — LAB (OUTPATIENT)
Dept: LAB | Facility: HOSPITAL | Age: 44
End: 2024-08-16
Payer: MEDICARE

## 2024-08-16 DIAGNOSIS — M25.50 PAIN IN JOINT, MULTIPLE SITES: ICD-10-CM

## 2024-08-16 DIAGNOSIS — R53.82 CHRONIC FATIGUE: ICD-10-CM

## 2024-08-16 DIAGNOSIS — H16.223 KERATOCONJUNCTIVITIS SICCA OF BOTH EYES NOT SPECIFIED AS SJOGREN'S: ICD-10-CM

## 2024-08-16 DIAGNOSIS — M13.879: ICD-10-CM

## 2024-08-16 DIAGNOSIS — R53.82 CHRONIC FATIGUE: Primary | ICD-10-CM

## 2024-08-16 PROCEDURE — 84550 ASSAY OF BLOOD/URIC ACID: CPT

## 2024-08-16 PROCEDURE — 36415 COLL VENOUS BLD VENIPUNCTURE: CPT

## 2024-08-16 PROCEDURE — 82164 ANGIOTENSIN I ENZYME TEST: CPT

## 2024-08-16 PROCEDURE — 84155 ASSAY OF PROTEIN SERUM: CPT

## 2024-08-16 PROCEDURE — 84165 PROTEIN E-PHORESIS SERUM: CPT

## 2024-08-17 LAB — URATE SERPL-MCNC: 5.1 MG/DL (ref 2.4–5.7)

## 2024-08-19 LAB
ACE SERPL-CCNC: 22 U/L (ref 14–82)
ALBUMIN SERPL ELPH-MCNC: 4.2 G/DL (ref 2.9–4.4)
ALBUMIN/GLOB SERPL: 1.4 {RATIO} (ref 0.7–1.7)
ALPHA1 GLOB SERPL ELPH-MCNC: 0.3 G/DL (ref 0–0.4)
ALPHA2 GLOB SERPL ELPH-MCNC: 0.7 G/DL (ref 0.4–1)
B-GLOBULIN SERPL ELPH-MCNC: 1 G/DL (ref 0.7–1.3)
GAMMA GLOB SERPL ELPH-MCNC: 1 G/DL (ref 0.4–1.8)
GLOBULIN SER CALC-MCNC: 2.9 G/DL (ref 2.2–3.9)
LABORATORY COMMENT REPORT: NORMAL
M PROTEIN SERPL ELPH-MCNC: NORMAL G/DL
PROT PATTERN SERPL ELPH-IMP: NORMAL
PROT SERPL-MCNC: 7.1 G/DL (ref 6–8.5)

## 2024-09-12 ENCOUNTER — TRANSCRIBE ORDERS (OUTPATIENT)
Dept: ADMINISTRATIVE | Facility: HOSPITAL | Age: 44
End: 2024-09-12
Payer: MEDICARE

## 2024-09-12 ENCOUNTER — LAB (OUTPATIENT)
Dept: LAB | Facility: HOSPITAL | Age: 44
End: 2024-09-12
Payer: MEDICARE

## 2024-09-12 DIAGNOSIS — E53.8 BIOTIN-(PROPIONYL-COA-CARBOXYLASE) LIGASE DEFICIENCY: ICD-10-CM

## 2024-09-12 DIAGNOSIS — E55.9 AVITAMINOSIS D: ICD-10-CM

## 2024-09-12 DIAGNOSIS — E06.3 CHRONIC LYMPHOCYTIC THYROIDITIS: ICD-10-CM

## 2024-09-12 DIAGNOSIS — E06.3 CHRONIC LYMPHOCYTIC THYROIDITIS: Primary | ICD-10-CM

## 2024-09-12 PROCEDURE — 84443 ASSAY THYROID STIM HORMONE: CPT

## 2024-09-12 PROCEDURE — 36415 COLL VENOUS BLD VENIPUNCTURE: CPT

## 2024-09-12 PROCEDURE — 82306 VITAMIN D 25 HYDROXY: CPT

## 2024-09-12 PROCEDURE — 84481 FREE ASSAY (FT-3): CPT

## 2024-09-12 PROCEDURE — 84439 ASSAY OF FREE THYROXINE: CPT

## 2024-09-12 PROCEDURE — 82607 VITAMIN B-12: CPT

## 2024-09-13 LAB
25(OH)D3 SERPL-MCNC: 53.7 NG/ML (ref 30–100)
T3FREE SERPL-MCNC: 4.39 PG/ML (ref 2–4.4)
T4 FREE SERPL-MCNC: 1.28 NG/DL (ref 0.92–1.68)
TSH SERPL DL<=0.05 MIU/L-ACNC: 1.18 UIU/ML (ref 0.27–4.2)
VIT B12 BLD-MCNC: 761 PG/ML (ref 211–946)

## 2024-09-17 ENCOUNTER — OFFICE VISIT (OUTPATIENT)
Dept: ENT CLINIC | Age: 44
End: 2024-09-17
Payer: MEDICARE

## 2024-09-17 VITALS
DIASTOLIC BLOOD PRESSURE: 84 MMHG | BODY MASS INDEX: 30.48 KG/M2 | WEIGHT: 172 LBS | HEIGHT: 63 IN | SYSTOLIC BLOOD PRESSURE: 128 MMHG

## 2024-09-17 DIAGNOSIS — M26.621 ARTHRALGIA OF RIGHT TEMPOROMANDIBULAR JOINT: Primary | ICD-10-CM

## 2024-09-17 PROBLEM — H65.91 MEE (MIDDLE EAR EFFUSION), RIGHT: Status: RESOLVED | Noted: 2023-10-16 | Resolved: 2024-09-17

## 2024-09-17 PROCEDURE — 99213 OFFICE O/P EST LOW 20 MIN: CPT | Performed by: PHYSICIAN ASSISTANT

## 2024-09-17 PROCEDURE — 4004F PT TOBACCO SCREEN RCVD TLK: CPT | Performed by: PHYSICIAN ASSISTANT

## 2024-09-17 PROCEDURE — G8417 CALC BMI ABV UP PARAM F/U: HCPCS | Performed by: PHYSICIAN ASSISTANT

## 2024-09-17 PROCEDURE — G8427 DOCREV CUR MEDS BY ELIG CLIN: HCPCS | Performed by: PHYSICIAN ASSISTANT

## 2024-09-17 RX ORDER — PHENTERMINE HYDROCHLORIDE 37.5 MG/1
37.5 CAPSULE ORAL EVERY MORNING
COMMUNITY

## 2024-09-17 RX ORDER — RIMEGEPANT SULFATE 75 MG/75MG
TABLET, ORALLY DISINTEGRATING ORAL
COMMUNITY
Start: 2024-09-03

## 2024-09-17 RX ORDER — TRIAMCINOLONE ACETONIDE 55 UG/1
2 SPRAY, METERED NASAL DAILY
COMMUNITY

## 2024-09-17 RX ORDER — IBUPROFEN 800 MG/1
800 TABLET, FILM COATED ORAL
Qty: 90 TABLET | Refills: 1 | Status: SHIPPED | OUTPATIENT
Start: 2024-09-17

## 2024-09-17 RX ORDER — FUROSEMIDE 40 MG
40 TABLET ORAL DAILY
COMMUNITY
Start: 2024-08-23

## 2024-09-17 RX ORDER — DESVENLAFAXINE 100 MG/1
100 TABLET, EXTENDED RELEASE ORAL DAILY
COMMUNITY

## 2024-09-17 RX ORDER — DICLOFENAC SODIUM 75 MG/1
75 TABLET, DELAYED RELEASE ORAL 2 TIMES DAILY PRN
COMMUNITY
Start: 2024-09-10

## 2024-09-17 RX ORDER — CYCLOBENZAPRINE HCL 10 MG
10 TABLET ORAL
Qty: 30 TABLET | Refills: 0 | Status: SHIPPED | OUTPATIENT
Start: 2024-09-17 | End: 2024-10-17

## 2024-09-17 RX ORDER — LAMOTRIGINE 25 MG/1
50 TABLET ORAL DAILY
COMMUNITY
Start: 2024-09-07

## 2024-09-17 RX ORDER — VITAMIN E (DL,TOCOPHERYL ACET) 180 MG
CAPSULE ORAL
COMMUNITY
Start: 2024-08-12

## 2024-09-17 RX ORDER — LEVOTHYROXINE SODIUM 100 UG/1
TABLET ORAL
COMMUNITY
Start: 2024-08-23

## 2024-09-17 ASSESSMENT — ENCOUNTER SYMPTOMS
SINUS PRESSURE: 0
EYE PAIN: 0
SORE THROAT: 0
FACIAL SWELLING: 0
EYE DISCHARGE: 0
SINUS PAIN: 0
VOICE CHANGE: 0
TROUBLE SWALLOWING: 0
RHINORRHEA: 0

## 2024-09-18 ENCOUNTER — HOSPITAL ENCOUNTER (OUTPATIENT)
Dept: GENERAL RADIOLOGY | Age: 44
Discharge: HOME | End: 2024-09-18
Payer: MEDICARE

## 2024-09-18 DIAGNOSIS — M26.621 ARTHRALGIA OF RIGHT TEMPOROMANDIBULAR JOINT: ICD-10-CM

## 2024-09-18 PROCEDURE — 70486 CT MAXILLOFACIAL W/O DYE: CPT

## 2024-09-24 ENCOUNTER — TELEPHONE (OUTPATIENT)
Dept: ENT CLINIC | Age: 44
End: 2024-09-24

## 2024-09-24 NOTE — TELEPHONE ENCOUNTER
Patient left voicemail requesting return call from [facility] regarding CT scan results.

## 2024-09-26 ENCOUNTER — TELEPHONE (OUTPATIENT)
Dept: ENT CLINIC | Age: 44
End: 2024-09-26

## 2024-09-26 NOTE — TELEPHONE ENCOUNTER
Pt. calling to see if can be scheduled to see Madan Everett today. Says having some swelling on the left side and issues are worse. Please advise.

## 2024-09-30 ENCOUNTER — OFFICE VISIT (OUTPATIENT)
Dept: ENT CLINIC | Age: 44
End: 2024-09-30
Payer: MEDICARE

## 2024-09-30 VITALS
DIASTOLIC BLOOD PRESSURE: 76 MMHG | WEIGHT: 170.6 LBS | BODY MASS INDEX: 30.23 KG/M2 | SYSTOLIC BLOOD PRESSURE: 110 MMHG | HEIGHT: 63 IN

## 2024-09-30 DIAGNOSIS — M26.621 ARTHRALGIA OF RIGHT TEMPOROMANDIBULAR JOINT: Primary | ICD-10-CM

## 2024-09-30 PROCEDURE — 99213 OFFICE O/P EST LOW 20 MIN: CPT | Performed by: PHYSICIAN ASSISTANT

## 2024-09-30 PROCEDURE — 4004F PT TOBACCO SCREEN RCVD TLK: CPT | Performed by: PHYSICIAN ASSISTANT

## 2024-09-30 PROCEDURE — G8417 CALC BMI ABV UP PARAM F/U: HCPCS | Performed by: PHYSICIAN ASSISTANT

## 2024-09-30 PROCEDURE — G8427 DOCREV CUR MEDS BY ELIG CLIN: HCPCS | Performed by: PHYSICIAN ASSISTANT

## 2024-09-30 RX ORDER — PREDNISONE 10 MG/1
TABLET ORAL
COMMUNITY
Start: 2024-09-27

## 2024-09-30 NOTE — PROGRESS NOTES
Daija is a pleasant 44-year-old  female that presents for follow-up due to worsening right TMJ arthralgia.  She reports that last Friday she noticed the right jaw to be very swollen and tender.  She had seen Radha Carroll and was treated with prednisone.  She reports that now the tenderness is much improved and she feels like the swelling is much improved.  She denies any obvious dislocation.  CT of the facial bones was completed 2 weeks ago with results still pending.  I personally had looked over the CT and noticed no obvious abnormalities.      Physical examination revealed the patient had normal TMs canals bilaterally with Panas gross is noted bilaterally.  Patient was noted with bilateral TMJ arthralgia with right greater than left.  There was no evidence of dislocation.  Oral exam demonstrated no remarkable changes.  Neck exam demonstrated no lymphadenopathy or thyromegaly.      Impression: Persistent right TMJ arthralgia-not responding to conservative management    Plan: I recommended referring the patient Dr. Anselmo Weldon for further evaluation and treatment.  She is agreeable and wishes to proceed.  She was reminded to call if she has any questions or concerns.        Electronically signed by ARMIN SIMMONS PA-C on 9/30/24 at 1:06 PM CDT

## 2024-10-03 ENCOUNTER — TELEPHONE (OUTPATIENT)
Dept: OBGYN CLINIC | Age: 44
End: 2024-10-03

## 2024-10-03 NOTE — TELEPHONE ENCOUNTER
Called pt and unable to reach. Lvm letting pt know we need to r/s her appt on 11/18 due to Juana Mirian being out of the office at that time.

## 2024-10-07 ENCOUNTER — TELEPHONE (OUTPATIENT)
Dept: OBGYN CLINIC | Age: 44
End: 2024-10-07

## 2024-10-07 NOTE — TELEPHONE ENCOUNTER
Daija was advised to call back to r/s  her well woman that was scheduled with Juana on 11-18-24, which she said was scheduled since last year . Please call her to r/s as I could not find anything until March 2025.  Ph # [phone] anytime      Thank you

## 2024-10-08 PROCEDURE — 87086 URINE CULTURE/COLONY COUNT: CPT | Performed by: NURSE PRACTITIONER

## 2024-10-10 ENCOUNTER — TELEPHONE (OUTPATIENT)
Dept: URGENT CARE | Facility: CLINIC | Age: 44
End: 2024-10-10
Payer: MEDICARE

## 2024-11-01 ENCOUNTER — TELEPHONE (OUTPATIENT)
Dept: OBGYN CLINIC | Age: 44
End: 2024-11-01

## 2024-11-01 DIAGNOSIS — Z12.31 ENCOUNTER FOR SCREENING MAMMOGRAM FOR MALIGNANT NEOPLASM OF BREAST: Primary | ICD-10-CM

## 2024-11-01 NOTE — TELEPHONE ENCOUNTER
Pt called requesting an order for a mammogram be sent to [facility], appt is on 11/21/24. Please send when able, thank you.

## 2024-11-12 ENCOUNTER — TRANSCRIBE ORDERS (OUTPATIENT)
Dept: ADMINISTRATIVE | Facility: HOSPITAL | Age: 44
End: 2024-11-12
Payer: MEDICARE

## 2024-11-12 ENCOUNTER — LAB (OUTPATIENT)
Dept: LAB | Facility: HOSPITAL | Age: 44
End: 2024-11-12
Payer: MEDICARE

## 2024-11-12 DIAGNOSIS — R59.1 LYMPHADENOPATHY: Primary | ICD-10-CM

## 2024-11-12 DIAGNOSIS — R74.02 ELEVATED LDH: ICD-10-CM

## 2024-11-12 DIAGNOSIS — R59.1 LYMPHADENOPATHY: ICD-10-CM

## 2024-11-12 LAB
ALBUMIN SERPL-MCNC: 5.1 G/DL (ref 3.5–5)
ALBUMIN/GLOB SERPL: 1.7 G/DL (ref 1.1–2.5)
ALP SERPL-CCNC: 97 U/L (ref 24–120)
ALT SERPL W P-5'-P-CCNC: 72 U/L (ref 0–35)
ANION GAP SERPL CALCULATED.3IONS-SCNC: 8 MMOL/L (ref 4–13)
AST SERPL-CCNC: 40 U/L (ref 7–45)
AUTO MIXED CELLS #: 0.6 10*3/MM3 (ref 0.1–2.6)
AUTO MIXED CELLS %: 6.8 % (ref 0.1–24)
BILIRUB SERPL-MCNC: 0.4 MG/DL (ref 0.1–1)
BUN SERPL-MCNC: 7 MG/DL (ref 5–21)
BUN/CREAT SERPL: 8.8
CALCIUM SPEC-SCNC: 9.6 MG/DL (ref 8.6–10.5)
CHLORIDE SERPL-SCNC: 97 MMOL/L (ref 98–110)
CO2 SERPL-SCNC: 30 MMOL/L (ref 24–31)
CREAT SERPL-MCNC: 0.8 MG/DL (ref 0.5–1.4)
EGFRCR SERPLBLD CKD-EPI 2021: 92.7 ML/MIN/1.73
ERYTHROCYTE [DISTWIDTH] IN BLOOD BY AUTOMATED COUNT: 13.8 % (ref 12.3–15.4)
ERYTHROCYTE [SEDIMENTATION RATE] IN BLOOD: 7 MM/HR (ref 0–20)
GLOBULIN UR ELPH-MCNC: 3 GM/DL
GLUCOSE SERPL-MCNC: 109 MG/DL (ref 70–100)
HCT VFR BLD AUTO: 45.2 % (ref 34–46.6)
HGB BLD-MCNC: 14.9 G/DL (ref 12–15.9)
LYMPHOCYTES # BLD AUTO: 2.5 10*3/MM3 (ref 0.7–3.1)
LYMPHOCYTES NFR BLD AUTO: 26.6 % (ref 19.6–45.3)
MCH RBC QN AUTO: 32.1 PG (ref 26.6–33)
MCHC RBC AUTO-ENTMCNC: 33 G/DL (ref 31.5–35.7)
MCV RBC AUTO: 97.4 FL (ref 79–97)
NEUTROPHILS NFR BLD AUTO: 6.4 10*3/MM3 (ref 1.7–7)
NEUTROPHILS NFR BLD AUTO: 66.6 % (ref 42.7–76)
PLATELET # BLD AUTO: 344 10*3/MM3 (ref 140–450)
PMV BLD AUTO: 8.8 FL (ref 6–12)
POTASSIUM SERPL-SCNC: 3.9 MMOL/L (ref 3.5–5.3)
PROT SERPL-MCNC: 8.1 G/DL (ref 6.3–8.7)
RBC # BLD AUTO: 4.64 10*6/MM3 (ref 3.77–5.28)
SODIUM SERPL-SCNC: 135 MMOL/L (ref 135–145)
WBC NRBC COR # BLD AUTO: 9.5 10*3/MM3 (ref 3.4–10.8)

## 2024-11-12 PROCEDURE — 83615 LACTATE (LD) (LDH) ENZYME: CPT

## 2024-11-12 PROCEDURE — 85652 RBC SED RATE AUTOMATED: CPT

## 2024-11-12 PROCEDURE — 86665 EPSTEIN-BARR CAPSID VCA: CPT

## 2024-11-12 PROCEDURE — 85025 COMPLETE CBC W/AUTO DIFF WBC: CPT

## 2024-11-12 PROCEDURE — 80053 COMPREHEN METABOLIC PANEL: CPT

## 2024-11-12 PROCEDURE — 36415 COLL VENOUS BLD VENIPUNCTURE: CPT

## 2024-11-12 PROCEDURE — 86765 RUBEOLA ANTIBODY: CPT

## 2024-11-12 PROCEDURE — 86140 C-REACTIVE PROTEIN: CPT

## 2024-11-12 PROCEDURE — 86735 MUMPS ANTIBODY: CPT

## 2024-11-12 PROCEDURE — 86762 RUBELLA ANTIBODY: CPT

## 2024-11-12 PROCEDURE — 85060 BLOOD SMEAR INTERPRETATION: CPT

## 2024-11-12 PROCEDURE — 86664 EPSTEIN-BARR NUCLEAR ANTIGEN: CPT

## 2024-11-13 LAB
CRP SERPL-MCNC: <0.3 MG/DL (ref 0–0.5)
CYTOLOGIST CVX/VAG CYTO: NORMAL
LDH SERPL-CCNC: 238 U/L (ref 135–214)
PATH INTERP BLD-IMP: NORMAL

## 2024-11-14 LAB
EBV NA IGG SER IA-ACNC: >600 U/ML (ref 0–17.9)
EBV VCA IGG SER IA-ACNC: 73.7 U/ML (ref 0–17.9)
EBV VCA IGM SER IA-ACNC: <36 U/ML (ref 0–35.9)
MEV IGG SER IA-ACNC: 54.9 AU/ML
MUV IGG SER IA-ACNC: 250 AU/ML
RUBV IGG SERPL IA-ACNC: 13.5 INDEX
SERVICE CMNT-IMP: ABNORMAL

## 2024-11-22 DIAGNOSIS — Z12.31 ENCOUNTER FOR SCREENING MAMMOGRAM FOR MALIGNANT NEOPLASM OF BREAST: ICD-10-CM

## 2025-01-08 ENCOUNTER — LAB (OUTPATIENT)
Dept: LAB | Facility: HOSPITAL | Age: 45
End: 2025-01-08
Payer: MEDICARE

## 2025-01-08 DIAGNOSIS — R74.02 ELEVATED LDH: ICD-10-CM

## 2025-01-08 LAB
ALBUMIN SERPL-MCNC: 4.6 G/DL (ref 3.5–5)
ALBUMIN/GLOB SERPL: 1.5 G/DL (ref 1.1–2.5)
ALP SERPL-CCNC: 77 U/L (ref 24–120)
ALT SERPL W P-5'-P-CCNC: 25 U/L (ref 0–35)
ANION GAP SERPL CALCULATED.3IONS-SCNC: 10 MMOL/L (ref 4–13)
AST SERPL-CCNC: 26 U/L (ref 7–45)
BILIRUB CONJ SERPL-MCNC: 0.1 MG/DL (ref 0–0.3)
BILIRUB SERPL-MCNC: 0.4 MG/DL (ref 0.1–1)
BUN SERPL-MCNC: 6 MG/DL (ref 5–21)
BUN/CREAT SERPL: 8.6
CALCIUM SPEC-SCNC: 9.3 MG/DL (ref 8.6–10.5)
CHLORIDE SERPL-SCNC: 103 MMOL/L (ref 98–110)
CO2 SERPL-SCNC: 25 MMOL/L (ref 24–31)
CREAT SERPL-MCNC: 0.7 MG/DL (ref 0.5–1.4)
EGFRCR SERPLBLD CKD-EPI 2021: 108.8 ML/MIN/1.73
GLOBULIN UR ELPH-MCNC: 3 GM/DL
GLUCOSE SERPL-MCNC: 107 MG/DL (ref 65–99)
POTASSIUM SERPL-SCNC: 3.8 MMOL/L (ref 3.5–5.3)
PROT SERPL-MCNC: 7.6 G/DL (ref 6.3–8.7)
SODIUM SERPL-SCNC: 138 MMOL/L (ref 135–145)

## 2025-01-08 PROCEDURE — 36415 COLL VENOUS BLD VENIPUNCTURE: CPT

## 2025-01-08 PROCEDURE — 82977 ASSAY OF GGT: CPT

## 2025-01-08 PROCEDURE — 80074 ACUTE HEPATITIS PANEL: CPT

## 2025-01-08 PROCEDURE — 80053 COMPREHEN METABOLIC PANEL: CPT

## 2025-01-08 PROCEDURE — 82728 ASSAY OF FERRITIN: CPT

## 2025-01-08 PROCEDURE — 82248 BILIRUBIN DIRECT: CPT

## 2025-01-09 LAB
FERRITIN SERPL-MCNC: 44.2 NG/ML (ref 13–150)
GGT SERPL-CCNC: 20 U/L (ref 5–36)
HAV IGM SERPL QL IA: NORMAL
HBV CORE IGM SERPL QL IA: NORMAL
HBV SURFACE AG SERPL QL IA: NORMAL
HCV AB SER QL: NORMAL

## 2025-01-14 ENCOUNTER — TRANSCRIBE ORDERS (OUTPATIENT)
Dept: ADMINISTRATIVE | Facility: HOSPITAL | Age: 45
End: 2025-01-14
Payer: MEDICARE

## 2025-01-14 DIAGNOSIS — E06.3 AUTOIMMUNE THYROIDITIS: Primary | ICD-10-CM

## 2025-01-21 ENCOUNTER — HOSPITAL ENCOUNTER (OUTPATIENT)
Dept: ULTRASOUND IMAGING | Facility: HOSPITAL | Age: 45
Discharge: HOME | End: 2025-01-21
Admitting: NURSE PRACTITIONER
Payer: MEDICARE

## 2025-01-21 DIAGNOSIS — E06.3 AUTOIMMUNE THYROIDITIS: ICD-10-CM

## 2025-01-21 PROCEDURE — 76536 US EXAM OF HEAD AND NECK: CPT

## 2025-02-03 ENCOUNTER — TELEPHONE (OUTPATIENT)
Dept: VASCULAR SURGERY | Facility: CLINIC | Age: 45
End: 2025-02-03
Payer: MEDICARE

## 2025-02-04 ENCOUNTER — PATIENT ROUNDING (BHMG ONLY) (OUTPATIENT)
Dept: VASCULAR SURGERY | Facility: CLINIC | Age: 45
End: 2025-02-04
Payer: MEDICARE

## 2025-02-04 ENCOUNTER — OFFICE VISIT (OUTPATIENT)
Dept: VASCULAR SURGERY | Facility: CLINIC | Age: 45
End: 2025-02-04
Payer: MEDICARE

## 2025-02-04 VITALS
SYSTOLIC BLOOD PRESSURE: 122 MMHG | WEIGHT: 166 LBS | DIASTOLIC BLOOD PRESSURE: 78 MMHG | OXYGEN SATURATION: 98 % | HEART RATE: 80 BPM | BODY MASS INDEX: 29.41 KG/M2 | HEIGHT: 63 IN

## 2025-02-04 DIAGNOSIS — I89.0 LYMPHEDEMA: Primary | ICD-10-CM

## 2025-02-04 DIAGNOSIS — E06.3 HYPOTHYROIDISM DUE TO HASHIMOTO'S THYROIDITIS: ICD-10-CM

## 2025-02-04 DIAGNOSIS — M79.7 FIBROMYALGIA: ICD-10-CM

## 2025-02-04 PROCEDURE — 1160F RVW MEDS BY RX/DR IN RCRD: CPT | Performed by: NURSE PRACTITIONER

## 2025-02-04 PROCEDURE — 1159F MED LIST DOCD IN RCRD: CPT | Performed by: NURSE PRACTITIONER

## 2025-02-04 PROCEDURE — 99214 OFFICE O/P EST MOD 30 MIN: CPT | Performed by: NURSE PRACTITIONER

## 2025-02-04 NOTE — LETTER
February 5, 2025     FREDDY Klein  [address] [facility] [place] [address]    Patient: Leatha Goldman   YOB: 1980   Date of Visit: 2/4/2025     Dear FREDDY Klein:       Thank you for referring Leatha Goldman to me for evaluation. Below are the relevant portions of my assessment and plan of care.    If you have questions, please do not hesitate to call me. I look forward to following Leatha along with you.         Sincerely,        FREDDY Peterson        CC: No Recipients    María Beauchamp APRN  02/05/25 1105  Sign when Signing Visit  02/04/2025      Jasmine Vuong APRN  [address] [place] [address]    Leatha Goldman  1980    Chief Complaint   Patient presents with   • NEW PATIENT     Referred from Jasmine Vuong for lymphedema. Patient complains of swelling in ankles and feet for years. Swollen twice as big while traveling recently. Also mentions occasional swelling in hands and arms. Has varicose veins behind both knees. Current smoker of 20 years, 1/2 pack daily.        Dear FREDDY Klein:      HPI  I had the pleasure of seeing your patient Leatha Goldman in the office today.  Thank you kindly for this consultation.  As you recall, Leatha Goldman is a 44 y.o.  female who you are currently following for routine health maintenance.  She has had complaints of swelling to her lower extremities for years.  She does remain active.  While traveling recently her legs were twice as big as they are currently.  She was very conscientious to get up and move during her travels.  She has some very mild varicosities behind her knees.  She is a current daily smoker.  She has been maintained on a diuretic bilateral long time.    Past Medical History:   Diagnosis Date   • Depression    • Dyspnea    • Edema    • Epigastric abdominal pain    • Fatigue    • Fibromyalgia    • GERD (gastroesophageal reflux disease)    • Globus sensation    • Hashimoto's thyroiditis    •  Hypertension    • Hypokalemia    • Hypothyroidism    • IBS (irritable bowel syndrome)    • Migraine    • Obesity    • Vitamin B 12 deficiency    • Vitamin D deficiency        Past Surgical History:   Procedure Laterality Date   • APPENDECTOMY     • CHOLECYSTECTOMY     • CYST REMOVAL     • EAR TUBES     • ENDOSCOPY  05/10/2010   • EXPLORATORY LAPAROTOMY     • LEFT OOPHORECTOMY     • TUBAL ABDOMINAL LIGATION     • VAGINAL CUFF ABLATION     • VAGINAL PROLAPSE REPAIR         Family History   Problem Relation Age of Onset   • Cancer Mother    • Cancer Father    • Cancer Maternal Aunt    • Cancer Paternal Aunt        Social History     Socioeconomic History   • Marital status:    Tobacco Use   • Smoking status: Every Day     Current packs/day: 0.50     Types: Cigarettes     Passive exposure: Never   • Smokeless tobacco: Never   Vaping Use   • Vaping status: Some Days   Substance and Sexual Activity   • Alcohol use: Not Currently     Comment: occasional   • Drug use: Never   • Sexual activity: Defer       No Known Allergies    Current Outpatient Medications   Medication Instructions   • busPIRone (BUSPAR) 10 mg   • cyanocobalamin 1000 MCG/ML injection INJECT 1 ML SUBCUTANEOUSLY EVERY MONTH   • desvenlafaxine (PRISTIQ) 100 mg, Daily   • Diclofenac Sodium (VOLTAREN) 4 g, 4 Times Daily PRN   • dicyclomine (BENTYL) 10 mg, 3 Times Daily PRN   • famotidine (PEPCID) 20 mg, 2 Times Daily   • furosemide (LASIX) 20 mg, Daily   • lamoTRIgine (LaMICtal) 25 MG tablet 2 tablets, Daily   • levothyroxine (SYNTHROID, LEVOTHROID) 100 MCG tablet TAKE 1 TABLET BY MOUTH 1 TIME EACH DAY BEFORE BREAKFAST.   • Magnesium 500 MG capsule 1 tablet, Every Night at Bedtime   • Nurtec 75 MG dispersible tablet TAKE 1 TABLET EVERY DAY BY ORAL ROUTE AS NEEDED.   • tiZANidine (ZANAFLEX) 4 mg, 2 Times Daily   • vitamin D (ERGOCALCIFEROL) 50,000 Units, Weekly         Review of Systems   Constitutional: Negative.    HENT: Negative.     Eyes: Negative.   "  Respiratory: Negative.     Cardiovascular:  Positive for leg swelling.   Gastrointestinal: Negative.    Endocrine: Negative.    Genitourinary: Negative.    Musculoskeletal:  Positive for myalgias.   Skin: Negative.    Allergic/Immunologic: Negative.    Neurological: Negative.    Hematological: Negative.    Psychiatric/Behavioral: Negative.         /78   Pulse 80   Ht 160 cm (63\")   Wt 75.3 kg (166 lb)   SpO2 98%   BMI 29.41 kg/m²   Physical Exam  Vitals and nursing note reviewed.   Constitutional:       General: She is not in acute distress.     Appearance: She is well-developed. She is not diaphoretic.   HENT:      Head: Normocephalic and atraumatic.   Eyes:      General: No scleral icterus.     Pupils: Pupils are equal, round, and reactive to light.   Neck:      Thyroid: No thyromegaly.      Vascular: No carotid bruit or JVD.   Cardiovascular:      Rate and Rhythm: Normal rate and regular rhythm.      Pulses: Normal pulses.      Heart sounds: Normal heart sounds and S2 normal. No murmur heard.     No friction rub. No gallop.   Pulmonary:      Effort: Pulmonary effort is normal.      Breath sounds: Normal breath sounds.   Abdominal:      General: Bowel sounds are normal.      Palpations: Abdomen is soft.   Musculoskeletal:         General: Normal range of motion.      Cervical back: Normal range of motion and neck supple.      Right lower leg: Edema present.      Left lower leg: Edema present.   Skin:     General: Skin is warm and dry.   Neurological:      Mental Status: She is alert and oriented to person, place, and time.      Cranial Nerves: No cranial nerve deficit.   Psychiatric:         Behavior: Behavior normal.         Thought Content: Thought content normal.         Judgment: Judgment normal.         Patient Active Problem List   Diagnosis   • Hypothyroidism due to Hashimoto's thyroiditis   • Fibromyalgia   • Macrocytosis         ICD-10-CM ICD-9-CM   1. Lymphedema  I89.0 457.1   2. " Fibromyalgia  M79.7 729.1   3. Hypothyroidism due to Hashimoto's thyroiditis  E06.3 245.2   4. BMI 29.0-29.9,adult  Z68.29 V85.25           Plan: After thoroughly evaluating Leatha Goldman, I believe the best course of action is to remain conservative from vascular surgery standpoint.  She does have chronic leg swelling and is maintained on Lasix.  She was recently traveling and swelling got significantly worse.  She really denies any venous insufficiency.  I would like her to continue wearing compression stockings on a daily basis.  I do think she would be a great candidate for home lymphedema pumps.  Despite compression 20 to 30 mmHg, exercise, and elevation she continues to have leg swelling.  Given that she is really asymptomatic and does not have significant varicosities other than a couple behind her knees she would like to hold on venous testing.  Her legs do have a lymphedema appearance so I will plan to just see her back in 3 months for follow-up.  If she does not have any significant results with compression and home lymphedema pumps, we can revisit the idea of a venous valvular insufficiency study.  The patient can continue taking their current medication regimen as previously planned.  Body mass index is 29.41 kg/m².       This was all discussed in full with complete understanding.    Thank you for allowing me to participate in the care of your patient.  Please do not hesitate with any questions or concerns.  I will keep you aware of any further encounters with Leatha Goldman.        Sincerely yours,         FREDDY Peterson

## 2025-02-04 NOTE — PROGRESS NOTES
February 4, 2025    Hello, may I speak with Leatha Goldman?    My name is stone       I am  with [facility]  [address], [address]  [place] [address] [phone].    Before we get started may I verify your date of birth? 1980    I am calling to officially welcome you to our practice and ask about your recent visit. Is this a good time to talk? yes    Tell me about your visit with us. What things went well?  Everything went well I was happy with my visit.       We're always looking for ways to make our patients' experiences even better. Do you have recommendations on ways we may improve?  no    Overall were you satisfied with your first visit to our practice? yes       I appreciate you taking the time to speak with me today. Is there anything else I can do for you? no      Thank you, and have a great day.

## 2025-02-05 DIAGNOSIS — I89.0 LYMPHEDEMA: Primary | ICD-10-CM

## 2025-02-05 NOTE — PROGRESS NOTES
02/04/2025      Jasmine Vuong, APRN  [address] [place] [address]    Leatha Goldman  1980    Chief Complaint   Patient presents with    NEW PATIENT     Referred from Jasmine Vuong for lymphedema. Patient complains of swelling in ankles and feet for years. Swollen twice as big while traveling recently. Also mentions occasional swelling in hands and arms. Has varicose veins behind both knees. Current smoker of 20 years, 1/2 pack daily.        Dear Jasmine Vuong, FREDDY:      HPI  I had the pleasure of seeing your patient Leatha Goldman in the office today.  Thank you kindly for this consultation.  As you recall, Leatha Goldman is a 44 y.o.  female who you are currently following for routine health maintenance.  She has had complaints of swelling to her lower extremities for years.  She does remain active.  While traveling recently her legs were twice as big as they are currently.  She was very conscientious to get up and move during her travels.  She has some very mild varicosities behind her knees.  She is a current daily smoker.  She has been maintained on a diuretic bilateral long time.    Past Medical History:   Diagnosis Date    Depression     Dyspnea     Edema     Epigastric abdominal pain     Fatigue     Fibromyalgia     GERD (gastroesophageal reflux disease)     Globus sensation     Hashimoto's thyroiditis     Hypertension     Hypokalemia     Hypothyroidism     IBS (irritable bowel syndrome)     Migraine     Obesity     Vitamin B 12 deficiency     Vitamin D deficiency        Past Surgical History:   Procedure Laterality Date    APPENDECTOMY      CHOLECYSTECTOMY      CYST REMOVAL      EAR TUBES      ENDOSCOPY  05/10/2010    EXPLORATORY LAPAROTOMY      LEFT OOPHORECTOMY      TUBAL ABDOMINAL LIGATION      VAGINAL CUFF ABLATION      VAGINAL PROLAPSE REPAIR         Family History   Problem Relation Age of Onset    Cancer Mother     Cancer Father     Cancer Maternal Aunt     Cancer Paternal Aunt   "      Social History     Socioeconomic History    Marital status:    Tobacco Use    Smoking status: Every Day     Current packs/day: 0.50     Types: Cigarettes     Passive exposure: Never    Smokeless tobacco: Never   Vaping Use    Vaping status: Some Days   Substance and Sexual Activity    Alcohol use: Not Currently     Comment: occasional    Drug use: Never    Sexual activity: Defer       No Known Allergies    Current Outpatient Medications   Medication Instructions    busPIRone (BUSPAR) 10 mg    cyanocobalamin 1000 MCG/ML injection INJECT 1 ML SUBCUTANEOUSLY EVERY MONTH    desvenlafaxine (PRISTIQ) 100 mg, Daily    Diclofenac Sodium (VOLTAREN) 4 g, 4 Times Daily PRN    dicyclomine (BENTYL) 10 mg, 3 Times Daily PRN    famotidine (PEPCID) 20 mg, 2 Times Daily    furosemide (LASIX) 20 mg, Daily    lamoTRIgine (LaMICtal) 25 MG tablet 2 tablets, Daily    levothyroxine (SYNTHROID, LEVOTHROID) 100 MCG tablet TAKE 1 TABLET BY MOUTH 1 TIME EACH DAY BEFORE BREAKFAST.    Magnesium 500 MG capsule 1 tablet, Every Night at Bedtime    Nurtec 75 MG dispersible tablet TAKE 1 TABLET EVERY DAY BY ORAL ROUTE AS NEEDED.    tiZANidine (ZANAFLEX) 4 mg, 2 Times Daily    vitamin D (ERGOCALCIFEROL) 50,000 Units, Weekly         Review of Systems   Constitutional: Negative.    HENT: Negative.     Eyes: Negative.    Respiratory: Negative.     Cardiovascular:  Positive for leg swelling.   Gastrointestinal: Negative.    Endocrine: Negative.    Genitourinary: Negative.    Musculoskeletal:  Positive for myalgias.   Skin: Negative.    Allergic/Immunologic: Negative.    Neurological: Negative.    Hematological: Negative.    Psychiatric/Behavioral: Negative.         /78   Pulse 80   Ht 160 cm (63\")   Wt 75.3 kg (166 lb)   SpO2 98%   BMI 29.41 kg/m²   Physical Exam  Vitals and nursing note reviewed.   Constitutional:       General: She is not in acute distress.     Appearance: She is well-developed. She is not diaphoretic.   HENT: "      Head: Normocephalic and atraumatic.   Eyes:      General: No scleral icterus.     Pupils: Pupils are equal, round, and reactive to light.   Neck:      Thyroid: No thyromegaly.      Vascular: No carotid bruit or JVD.   Cardiovascular:      Rate and Rhythm: Normal rate and regular rhythm.      Pulses: Normal pulses.      Heart sounds: Normal heart sounds and S2 normal. No murmur heard.     No friction rub. No gallop.   Pulmonary:      Effort: Pulmonary effort is normal.      Breath sounds: Normal breath sounds.   Abdominal:      General: Bowel sounds are normal.      Palpations: Abdomen is soft.   Musculoskeletal:         General: Normal range of motion.      Cervical back: Normal range of motion and neck supple.      Right lower leg: Edema present.      Left lower leg: Edema present.   Skin:     General: Skin is warm and dry.   Neurological:      Mental Status: She is alert and oriented to person, place, and time.      Cranial Nerves: No cranial nerve deficit.   Psychiatric:         Behavior: Behavior normal.         Thought Content: Thought content normal.         Judgment: Judgment normal.         Patient Active Problem List   Diagnosis    Hypothyroidism due to Hashimoto's thyroiditis    Fibromyalgia    Macrocytosis         ICD-10-CM ICD-9-CM   1. Lymphedema  I89.0 457.1   2. Fibromyalgia  M79.7 729.1   3. Hypothyroidism due to Hashimoto's thyroiditis  E06.3 245.2   4. BMI 29.0-29.9,adult  Z68.29 V85.25           Plan: After thoroughly evaluating Leatha Goldman, I believe the best course of action is to remain conservative from vascular surgery standpoint.  She does have chronic leg swelling and is maintained on Lasix.  She was recently traveling and swelling got significantly worse.  She really denies any venous insufficiency.  I would like her to continue wearing compression stockings on a daily basis.  I do think she would be a great candidate for home lymphedema pumps.  Despite compression 20 to 30 mmHg,  exercise, and elevation she continues to have leg swelling.  Given that she is really asymptomatic and does not have significant varicosities other than a couple behind her knees she would like to hold on venous testing.  Her legs do have a lymphedema appearance so I will plan to just see her back in 3 months for follow-up.  If she does not have any significant results with compression and home lymphedema pumps, we can revisit the idea of a venous valvular insufficiency study.  The patient can continue taking their current medication regimen as previously planned.  Body mass index is 29.41 kg/m².       This was all discussed in full with complete understanding.    Thank you for allowing me to participate in the care of your patient.  Please do not hesitate with any questions or concerns.  I will keep you aware of any further encounters with Leatha Goldman.        Sincerely yours,         FREDDY Peterson

## 2025-02-10 ENCOUNTER — OFFICE VISIT (OUTPATIENT)
Dept: ENT CLINIC | Age: 45
End: 2025-02-10
Payer: MEDICARE

## 2025-02-10 VITALS
WEIGHT: 170 LBS | HEIGHT: 63 IN | SYSTOLIC BLOOD PRESSURE: 110 MMHG | BODY MASS INDEX: 30.12 KG/M2 | DIASTOLIC BLOOD PRESSURE: 78 MMHG

## 2025-02-10 DIAGNOSIS — M26.621 ARTHRALGIA OF RIGHT TEMPOROMANDIBULAR JOINT: Primary | ICD-10-CM

## 2025-02-10 PROCEDURE — G8417 CALC BMI ABV UP PARAM F/U: HCPCS | Performed by: PHYSICIAN ASSISTANT

## 2025-02-10 PROCEDURE — 99213 OFFICE O/P EST LOW 20 MIN: CPT | Performed by: PHYSICIAN ASSISTANT

## 2025-02-10 PROCEDURE — G8428 CUR MEDS NOT DOCUMENT: HCPCS | Performed by: PHYSICIAN ASSISTANT

## 2025-02-10 PROCEDURE — 4004F PT TOBACCO SCREEN RCVD TLK: CPT | Performed by: PHYSICIAN ASSISTANT

## 2025-02-10 RX ORDER — FLUTICASONE PROPIONATE 50 MCG
2 SPRAY, SUSPENSION (ML) NASAL DAILY
Qty: 16 G | Refills: 3 | Status: SHIPPED | OUTPATIENT
Start: 2025-02-10

## 2025-02-10 NOTE — PROGRESS NOTES
Daija is a pleasant 44-year-old  female that represents to the clinic with complaints of recurrent ear pain.  She reports that she has been treated approximately 3 times due to recurrent ear infections.  She complains of fullness to the ears that is constant with no tinnitus noted.  Patient has a history of TMJ arthralgia that has been previously treated.  Due to this advancing, she has been referred to [facility] oral surgery department to Dr. Tamika Dickson.      Physical examination with the microscope revealed the patient to have normal TMs and canals bilaterally with no evidence of a middle ear effusion or any infection.  Tympanogram was also performed and demonstrated type A tympanograms bilaterally.  Neck exam demonstrated no lymphadenopathy or thyromegaly with patient noted with notable tenderness to the right mandibular region.  Oral exam demonstrated obvious TMJ arthralgia with right greater than left with no evidence of dislocation.  A positive click was noted to the right side.  Patient was noted with an absent left posterior molar that was felt to contribute to the imbalance as well as the patient grinding her teeth at night.      Impression: Ear pain secondary radiation from TMJ arthralgia bilateral with normal ear exam    Plan: I recommended the patient to try nonsteroidal therapy as well as heat to the TMJ region.  She currently has an appointment to see Dr. Dickson on February 24.  She was reminded to call if she has any questions or concerns.  Due to the nasal congestion I recommended over-the-counter Sudafed and Flonase.      Electronically signed by ARMIN SIMMONS PA-C on 2/10/25 at 3:21 PM CST

## 2025-04-01 ENCOUNTER — HOSPITAL ENCOUNTER (OUTPATIENT)
Dept: PHYSICAL THERAPY | Facility: HOSPITAL | Age: 45
Setting detail: THERAPIES SERIES
Discharge: HOME | End: 2025-04-01
Payer: MEDICARE

## 2025-04-01 DIAGNOSIS — I89.0 LYMPHEDEMA OF BOTH LOWER EXTREMITIES: Primary | ICD-10-CM

## 2025-04-01 DIAGNOSIS — M79.7 FIBROMYALGIA: ICD-10-CM

## 2025-04-01 DIAGNOSIS — E06.3 HYPOTHYROIDISM DUE TO HASHIMOTO'S THYROIDITIS: ICD-10-CM

## 2025-04-01 DIAGNOSIS — Q82.0 PRIMARY LYMPHEDEMA: ICD-10-CM

## 2025-04-01 PROCEDURE — 97140 MANUAL THERAPY 1/> REGIONS: CPT | Performed by: PHYSICAL THERAPIST

## 2025-04-01 PROCEDURE — 97162 PT EVAL MOD COMPLEX 30 MIN: CPT | Performed by: PHYSICAL THERAPIST

## 2025-04-01 NOTE — THERAPY EVALUATION
Physical Therapy Initial Evaluation and Plan of Care  [address] [place] [address]    Patient: Leatha Goldman             : 1980  Today's Date: 2025  Referring practitioner: FREDDY Peterson  Date of Initial Visit: 2025  Patient seen for 1 sessions    Visit Diagnoses:    ICD-10-CM ICD-9-CM   1. Lymphedema of both lower extremities  I89.0 457.1   2. Primary lymphedema  Q82.0 457.1   3. Fibromyalgia  M79.7 729.1   4. Hypothyroidism due to Hashimoto's thyroiditis  E06.3 245.2     Past Medical History:   Diagnosis Date    Depression     Dyspnea     Edema     Epigastric abdominal pain     Fatigue     Fibromyalgia     GERD (gastroesophageal reflux disease)     Globus sensation     Hashimoto's thyroiditis     Hypertension     Hypokalemia     Hypothyroidism     IBS (irritable bowel syndrome)     Migraine     Obesity     Vitamin B 12 deficiency     Vitamin D deficiency      Past Surgical History:   Procedure Laterality Date    APPENDECTOMY      CHOLECYSTECTOMY      CYST REMOVAL      EAR TUBES      ENDOSCOPY  05/10/2010    EXPLORATORY LAPAROTOMY      LEFT OOPHORECTOMY      TUBAL ABDOMINAL LIGATION      VAGINAL CUFF ABLATION      VAGINAL PROLAPSE REPAIR         SUBJECTIVE     Subjective Evaluation    History of Present Illness  Date of onset: 2024  Mechanism of injury: Says she has been suffering from swelling in her legs for years now.  She has been on Lasix for at least 3 years and this is the only way she has been able to get the fluid off.  Now it has worsened over the last year    Subjective comment: She has had the pumps for about a month.Quality of life: good    Pain  At worst pain ratin              OBJECTIVE     Objective      25 1400   Lymphedema Assessment   Lymphedema Classification RLE:;LLE:;primary   Lymphedema Surgery Comments appendix, gallbladder, ovary removed, tubal ligation   Infections or Cellulitis? no    Lymphedema Edema Assessment   Edema Assessment Comment LE edema was not pitting today as swelling was not as severe as it usually is. She did have suprapubic and lower abdominal swelling that I could palpate through the sheet in addition to legs and other areas noted below   Lymphedema Measurements   Measurement Type(s) Circumferential   Circumferential Areas Lower extremities;Trunk   BLE Circumferential (cm)   Measurement Location 1 BOT   Left 1 21.5 cm   Right 1 21.4 cm   Measurement Location 2 10   Left 2 25.5 cm   Right 2 24.7 cm   Measurement Location 3 10   Left 3 25.5 cm   Right 3 25.2 cm   Measurement Location 4 10   Left 4 34.1 cm   Right 4 35 cm   Measurement Location 5 10   Left 5 35.2 cm   Right 5 35.6 cm   Measurement Location 6 10   Left 6 35.5 cm   Right 6 37.3 cm   Measurement Location 7 10   Left 7 43.5 cm   Right 7 43.2 cm   LLE Circumferential Total 220.8 cm   RLE Circumferential Total 222.4 cm   Trunk Circumferential (cm)   Measurement Location 1 axillae   Trunk 1 99.2 cm   Measurement Location 2 umbilicus   Trunk 2 93 cm   Measurement Location 3 hips   Trunk 3 107 cm   Trunk Circumferential Total 299.2 cm   Manual Lymphatic Drainage   Manual Lymphatic Drainage initial sequence;opened regional lymph nodes;head/neck treatment   Initial Sequence diaphragmatic breathing;full neck;abdomen   Full Neck puffiness under chin   Abdomen Comment with deep breathing, did some resistance and different hand placements   Opened Regional Lymph Nodes axillary;inguinal   Axillary Comment she has some swelling in the R axilla that sometimes gets worse and has knots in it.   Inguinal Comment reports pain along hip crease and swelling that fluctuates   Head/Neck Treatment pre-auricular nodes;post-auricular nodes;occipital nodes;brief MLD   Pre-Auricular Nodes demonstrated and had her practice self massage   Occipital Nodes swelling at the base of hairline   Brief MLD SCF clearance   Manual Therapy 30    Compression/Skin Care   Compression/Skin Care Comments Bioflect leggings- she will look into purchasing       Therapy Education/Self Care 17373   Education offered today Lymphatic system, treatment plan, use of compression   Medbride Code    Ongoing HEP      Timed Minutes        Total Timed Treatment:     30   mins  Total Time of Visit:            60   mins    ASSESSMENT/PLAN     Goals                                          Progress Note due by 5/1/25                                                      Recert due by 6/29/25   STG by: 6 weeks Comments Date Status   Patient will have a good basic understanding of lymphedema and its suggested risk reduction practices      Patient will be independent with remedial HEP for lymphedema      Patient will be independent with self MLD for lymphedema      If patient has an increase in truncal measurements with use of the basic pump, she will get a flexitouch and be independent with its use.       LTG by: 12 weeks      Patient will have appropriate compression garments for lymphedema maintenance      She will be able to perform regular daily activities without increased swelling in the legs.       Patient will be independent with comprehensive maintenance program for lymphedema      She will have a reduction in the hips and abdomen of at least 10 cm.                     Assessment & Plan       Assessment  Assessment details: Leatha is a 44-year-old female referred to us due to chronic swelling in both legs.  Her legs were not significantly swelled today, but with evaluation it became apparent that she has lymphatic congestion affecting her more than just her legs.  She has been on diuretic therapy for at least 3 years for the swelling.  Since this can actually worsen the swelling if it is lymphedema, I hope with treatment she will be able to get off of that medication.  She does to carry inflammation in a lot of her body tissues which is causing discomfort and fluctuating  levels of swelling.  She does already have a home lymphedema pump that is the basic pump.  It is very likely that she is going to need the advanced pump to address the proximal swelling and swelling in her abdomen.  Barriers to therapy: Multiple comorbidities that cause chronic pain and inflammation  Prognosis: good    Plan  Therapy options: will be seen for skilled therapy services  Frequency: 2x week  Duration in weeks: 12  Treatment plan discussed with: patient      Anticipated CPT codes: Therapeutic Exercise 87650, Manual Therapy 21005, Therapeutic Activity 17794, and Self Care/Home Management 92741    SIGNATURE: Nicole Concepcion, PT, DPT, [facility] License #: [id number]  Electronically Signed on 4/2/2025    Initial Certification  Certification Period: 4/1/2025   through 6/29/2025  I certify that the therapy services are furnished while this patient is under my care.  The services outlined above are required by this patient, and will be reviewed every 90 days.     PHYSICIAN: María Beauchamp APRN (NPI: [id number])    Signature:_________________________________________DATE: ________      Please sign and return via fax to [phone].   Thank you so much for letting us work with Leatha. I appreciate your letting us work with your patients. If you have any questions or concerns, please don't hesitate to contact me.          [address] [address]  [phone]

## 2025-04-02 ENCOUNTER — TRANSCRIBE ORDERS (OUTPATIENT)
Dept: ADMINISTRATIVE | Facility: HOSPITAL | Age: 45
End: 2025-04-02
Payer: MEDICARE

## 2025-04-02 ENCOUNTER — LAB (OUTPATIENT)
Dept: LAB | Facility: HOSPITAL | Age: 45
End: 2025-04-02
Payer: MEDICARE

## 2025-04-02 DIAGNOSIS — Z78.0 ASYMPTOMATIC MENOPAUSAL STATE: ICD-10-CM

## 2025-04-02 DIAGNOSIS — Z00.01 ENCOUNTER FOR GENERAL ADULT MEDICAL EXAMINATION WITH ABNORMAL FINDINGS: ICD-10-CM

## 2025-04-02 DIAGNOSIS — Z78.0 ASYMPTOMATIC MENOPAUSAL STATE: Primary | ICD-10-CM

## 2025-04-02 DIAGNOSIS — E06.3 AUTOIMMUNE THYROIDITIS: ICD-10-CM

## 2025-04-02 DIAGNOSIS — E55.9 VITAMIN D DEFICIENCY: ICD-10-CM

## 2025-04-02 DIAGNOSIS — E53.8 DEFICIENCY OF OTHER SPECIFIED B GROUP VITAMINS: ICD-10-CM

## 2025-04-02 LAB
ALBUMIN SERPL-MCNC: 4.9 G/DL (ref 3.5–5)
ALBUMIN/GLOB SERPL: 1.6 G/DL (ref 1.1–2.5)
ALP SERPL-CCNC: 76 U/L (ref 24–120)
ALT SERPL W P-5'-P-CCNC: 24 U/L (ref 0–35)
ANION GAP SERPL CALCULATED.3IONS-SCNC: 7 MMOL/L (ref 4–13)
AST SERPL-CCNC: 22 U/L (ref 7–45)
AUTO MIXED CELLS #: 0.7 10*3/MM3 (ref 0.1–2.6)
AUTO MIXED CELLS %: 6.9 % (ref 0.1–24)
BILIRUB SERPL-MCNC: 0.5 MG/DL (ref 0.1–1)
BILIRUB UR QL STRIP: NEGATIVE
BUN SERPL-MCNC: 8 MG/DL (ref 5–21)
BUN/CREAT SERPL: 10
CALCIUM SPEC-SCNC: 9.4 MG/DL (ref 8.6–10.5)
CHLORIDE SERPL-SCNC: 98 MMOL/L (ref 98–110)
CHOLEST SERPL-MCNC: 187 MG/DL (ref 130–200)
CLARITY UR: CLEAR
CO2 SERPL-SCNC: 31 MMOL/L (ref 24–31)
COLOR UR: YELLOW
CREAT SERPL-MCNC: 0.8 MG/DL (ref 0.5–1.4)
EGFRCR SERPLBLD CKD-EPI 2021: 92.7 ML/MIN/1.73
ERYTHROCYTE [DISTWIDTH] IN BLOOD BY AUTOMATED COUNT: 13.4 % (ref 12.3–15.4)
GLOBULIN UR ELPH-MCNC: 3 GM/DL
GLUCOSE SERPL-MCNC: 103 MG/DL (ref 65–99)
GLUCOSE UR STRIP-MCNC: NEGATIVE MG/DL
HBA1C MFR BLD: 5.5 % (ref 4.8–5.9)
HCT VFR BLD AUTO: 44.8 % (ref 34–46.6)
HDLC SERPL-MCNC: 54 MG/DL
HGB BLD-MCNC: 14.9 G/DL (ref 12–15.9)
HGB UR QL STRIP.AUTO: NEGATIVE
KETONES UR QL STRIP: NEGATIVE
LDLC SERPL CALC-MCNC: 107 MG/DL (ref 0–99)
LDLC/HDLC SERPL: 1.91 {RATIO}
LEUKOCYTE ESTERASE UR QL STRIP.AUTO: NEGATIVE
LYMPHOCYTES # BLD AUTO: 2.4 10*3/MM3 (ref 0.7–3.1)
LYMPHOCYTES NFR BLD AUTO: 22.8 % (ref 19.6–45.3)
MCH RBC QN AUTO: 32.2 PG (ref 26.6–33)
MCHC RBC AUTO-ENTMCNC: 33.3 G/DL (ref 31.5–35.7)
MCV RBC AUTO: 96.8 FL (ref 79–97)
NEUTROPHILS NFR BLD AUTO: 7.6 10*3/MM3 (ref 1.7–7)
NEUTROPHILS NFR BLD AUTO: 70.3 % (ref 42.7–76)
NITRITE UR QL STRIP: NEGATIVE
PH UR STRIP.AUTO: 6 [PH] (ref 5–8)
PLATELET # BLD AUTO: 353 10*3/MM3 (ref 140–450)
PMV BLD AUTO: 8.9 FL (ref 6–12)
POTASSIUM SERPL-SCNC: 4.1 MMOL/L (ref 3.5–5.3)
PROT SERPL-MCNC: 7.9 G/DL (ref 6.3–8.7)
PROT UR QL STRIP: NEGATIVE
RBC # BLD AUTO: 4.63 10*6/MM3 (ref 3.77–5.28)
SODIUM SERPL-SCNC: 136 MMOL/L (ref 135–145)
SP GR UR STRIP: <=1.005 (ref 1–1.03)
TRIGL SERPL-MCNC: 150 MG/DL (ref 0–149)
UROBILINOGEN UR QL STRIP: NORMAL
VLDLC SERPL-MCNC: 26 MG/DL (ref 5–40)
WBC NRBC COR # BLD AUTO: 10.7 10*3/MM3 (ref 3.4–10.8)

## 2025-04-02 PROCEDURE — 84402 ASSAY OF FREE TESTOSTERONE: CPT

## 2025-04-02 PROCEDURE — 85025 COMPLETE CBC W/AUTO DIFF WBC: CPT

## 2025-04-02 PROCEDURE — 80053 COMPREHEN METABOLIC PANEL: CPT

## 2025-04-02 PROCEDURE — 84443 ASSAY THYROID STIM HORMONE: CPT

## 2025-04-02 PROCEDURE — 84144 ASSAY OF PROGESTERONE: CPT

## 2025-04-02 PROCEDURE — 80061 LIPID PANEL: CPT

## 2025-04-02 PROCEDURE — 81003 URINALYSIS AUTO W/O SCOPE: CPT

## 2025-04-02 PROCEDURE — 82672 ASSAY OF ESTROGEN: CPT

## 2025-04-02 PROCEDURE — 84403 ASSAY OF TOTAL TESTOSTERONE: CPT

## 2025-04-02 PROCEDURE — 82533 TOTAL CORTISOL: CPT

## 2025-04-02 PROCEDURE — 84439 ASSAY OF FREE THYROXINE: CPT

## 2025-04-02 PROCEDURE — 83036 HEMOGLOBIN GLYCOSYLATED A1C: CPT

## 2025-04-02 PROCEDURE — 36415 COLL VENOUS BLD VENIPUNCTURE: CPT

## 2025-04-02 PROCEDURE — 82607 VITAMIN B-12: CPT

## 2025-04-02 PROCEDURE — 84481 FREE ASSAY (FT-3): CPT

## 2025-04-02 PROCEDURE — 82306 VITAMIN D 25 HYDROXY: CPT

## 2025-04-02 PROCEDURE — 82627 DEHYDROEPIANDROSTERONE: CPT

## 2025-04-02 PROCEDURE — 82746 ASSAY OF FOLIC ACID SERUM: CPT

## 2025-04-03 LAB
25(OH)D3 SERPL-MCNC: 54 NG/ML (ref 30–100)
CORTIS SERPL-MCNC: 8.09 MCG/DL
FOLATE SERPL-MCNC: 7.57 NG/ML (ref 4.78–24.2)
PROGEST SERPL-MCNC: 8.03 NG/ML
T3FREE SERPL-MCNC: 2.69 PG/ML (ref 2–4.4)
T4 FREE SERPL-MCNC: 1.11 NG/DL (ref 0.92–1.68)
TSH SERPL DL<=0.05 MIU/L-ACNC: 3.03 UIU/ML (ref 0.27–4.2)
VIT B12 BLD-MCNC: 785 PG/ML (ref 211–946)

## 2025-04-04 ENCOUNTER — HOSPITAL ENCOUNTER (OUTPATIENT)
Dept: PHYSICAL THERAPY | Facility: HOSPITAL | Age: 45
Setting detail: THERAPIES SERIES
Discharge: HOME | End: 2025-04-04
Payer: MEDICARE

## 2025-04-04 DIAGNOSIS — M79.7 FIBROMYALGIA: ICD-10-CM

## 2025-04-04 DIAGNOSIS — I89.0 LYMPHEDEMA OF BOTH LOWER EXTREMITIES: Primary | ICD-10-CM

## 2025-04-04 DIAGNOSIS — E06.3 HYPOTHYROIDISM DUE TO HASHIMOTO'S THYROIDITIS: ICD-10-CM

## 2025-04-04 DIAGNOSIS — Q82.0 PRIMARY LYMPHEDEMA: ICD-10-CM

## 2025-04-04 LAB — DHEA-S SERPL-MCNC: 145 UG/DL (ref 41.2–243.7)

## 2025-04-04 PROCEDURE — 97535 SELF CARE MNGMENT TRAINING: CPT

## 2025-04-04 PROCEDURE — 97140 MANUAL THERAPY 1/> REGIONS: CPT

## 2025-04-04 NOTE — THERAPY TREATMENT NOTE
Physical Therapy Treatment Note  [facility]  A department [place] [address], [place] [address]    Patient: Leatha Goldman                                                 Visit Date: 2025  :     1980    Referring practitioner:    FREDDY Peterson  Date of Initial Visit:          Type: THERAPY  Episode: BLE lymphedema  Number of visits this episode: 2    Visit Diagnoses:    ICD-10-CM ICD-9-CM   1. Lymphedema of both lower extremities  I89.0 457.1   2. Primary lymphedema  Q82.0 457.1   3. Fibromyalgia  M79.7 729.1   4. Hypothyroidism due to Hashimoto's thyroiditis  E06.3 245.2     SUBJECTIVE     Subjective:  She worked on the MLD a little at home but she thinks she was doing the massage incorrectly.  She can tell she is swollen today.  She is using the pumps, this makes her legs feel better and are smaller. She can tell her lower abdominal area is swollen.  She is just swollen today all over, worse on the R side of her body.  She is still looking into what type of compression leggings to buy.       PAIN: 1-2/10 shoulders and up to her neck.          OBJECTIVE     Objective    Lymphedema       Row Name 25 0945             Subjective Pain    Able to rate subjective pain? yes  -AL      Pre-Treatment Pain Level 2  -AL      Post-Treatment Pain Level 2  -AL         Manual Lymphatic Drainage    Manual Lymphatic Drainage initial sequence;opened regional lymph nodes;head/neck treatment;extremity treatment  -AL      Initial Sequence diaphragmatic breathing;full neck;abdomen  -AL      Full Neck --  -AL      Abdomen superficial  -AL      Diaphragmatic Breathing X 9 with abdominals  -AL      Opened Regional Lymph Nodes axillary;inguinal  -AL      Axillary right;left  -AL      Inguinal right;left  -AL      Extremity Treatment MLD to full limb  -AL      MLD to Full Limb Bilateral lower extremities  Worked in supine and prone  -AL      Head/Neck Treatment  pre-auricular nodes;post-auricular nodes;occipital nodes;brief MLD  MLD to the scalp  -AL      Manual Therapy 45  -AL         Compression/Skin Care    Compression/Skin Care compression garment  -AL      Compression/Skin Care Comments She will order compression leggings  -AL                User Key  (r) = Recorded By, (t) = Taken By, (c) = Cosigned By      Initials Name Provider Type    Lorelei Grey, MAHESH, CLT-ISABEL Physical Therapist Assistant                    Therapy Education/Self Care 30908   Education offered today Educated about the lymph system.  Instructed on the HEP and MLD   Medbride Code    Ongoing HEP   Work on HEP, MLD, in order compression   Timed Minutes 15       Total Timed Treatment:     60   mins  Total Time of Visit:             60   mins         ASSESSMENT/PLAN     GOALS  Goals                                          Progress Note due by 5/1/25                                                      Recert due by 6/29/25   STG by: 6 weeks Comments Date Status   Patient will have a good basic understanding of lymphedema and its suggested risk reduction practices Educated about the lymph system 4/4/2025 Ongoing   Patient will be independent with remedial HEP for lymphedema Instructed on the HEP 4/4/2025 Ongoing   Patient will be independent with self MLD for lymphedema  Instructed on the MLD 4/4/25  Ongoing   If patient has an increase in truncal measurements with use of the basic pump, she will get a flexitouch and be independent with its use.          LTG by: 12 weeks         Patient will have appropriate compression garments for lymphedema maintenance         She will be able to perform regular daily activities without increased swelling in the legs.          Patient will be independent with comprehensive maintenance program for lymphedema         She will have a reduction in the hips and abdomen of at least 10 cm.                                 Anticipated CPT codes: Therapeutic Exercise  09883, Manual Therapy 51065, Therapeutic Activity 05723, and Self Care/Home Management 50834       Assessment/Plan     ASSESSMENT:   Patient will start working on the HEP and the MLD, she will order her compression soon.  She will bring her significant other with her for one of the treatment so we can teach him how to do the MLD.  Patient would greatly benefit from a Flexitouch pump.    PLAN:   Will see patient 2 times a week for MLD.    SIGNATURE: Lorelei Rose PTA, [facility] [place] License #: [id number]  Electronically Signed on 4/4/2025        [address] [place] [address]. [address]  [phone]

## 2025-04-05 LAB — ESTROGEN SERPL-MCNC: 138 PG/ML

## 2025-04-08 LAB
TESTOST FREE SERPL-MCNC: 0.5 PG/ML (ref 0–4.2)
TESTOST SERPL-MCNC: 4 NG/DL (ref 4–50)

## 2025-04-11 ENCOUNTER — HOSPITAL ENCOUNTER (OUTPATIENT)
Dept: PHYSICAL THERAPY | Facility: HOSPITAL | Age: 45
Setting detail: THERAPIES SERIES
Discharge: HOME | End: 2025-04-11
Payer: MEDICARE

## 2025-04-11 DIAGNOSIS — Q82.0 PRIMARY LYMPHEDEMA: ICD-10-CM

## 2025-04-11 DIAGNOSIS — I89.0 LYMPHEDEMA OF BOTH LOWER EXTREMITIES: Primary | ICD-10-CM

## 2025-04-11 DIAGNOSIS — E06.3 HYPOTHYROIDISM DUE TO HASHIMOTO'S THYROIDITIS: ICD-10-CM

## 2025-04-11 DIAGNOSIS — M79.7 FIBROMYALGIA: ICD-10-CM

## 2025-04-11 PROCEDURE — 97140 MANUAL THERAPY 1/> REGIONS: CPT | Performed by: PHYSICAL THERAPIST

## 2025-04-11 NOTE — THERAPY TREATMENT NOTE
Physical Therapy Treatment Note  [facility] Therapy [facility]  A [address] [address]    Patient: Leatha Goldman                                                 Visit Date: 2025  :     1980    Referring practitioner:    FREDDY Peterson  Date of Initial Visit:          Type: THERAPY  Episode: BLE lymphedema  Number of visits this episode: 3    Visit Diagnoses:    ICD-10-CM ICD-9-CM   1. Lymphedema of both lower extremities  I89.0 457.1   2. Primary lymphedema  Q82.0 457.1   3. Fibromyalgia  M79.7 729.1   4. Hypothyroidism due to Hashimoto's thyroiditis  E06.3 245.2     SUBJECTIVE     Subjective:  She still had some questions about doing the massage correctly.  She says she felt fine after the visit last time.  The weather is really bothering her all over.    PAIN: 1-2/10 shoulders and up to her neck.          OBJECTIVE     Objective    Lymphedema       Row Name 25 0800             Subjective Pain    Able to rate subjective pain? yes  -HR      Pre-Treatment Pain Level 2  -HR         Manual Lymphatic Drainage    Manual Lymphatic Drainage initial sequence;opened regional lymph nodes;head/neck treatment;extremity treatment  -HR      Initial Sequence diaphragmatic breathing;full neck;abdomen  -HR      Abdomen superficial  -HR      Diaphragmatic Breathing X 9 with abdominals  -HR      Opened Regional Lymph Nodes axillary;inguinal  -HR      Axillary right;left  -HR      Inguinal right;left  -HR      Extremity Treatment MLD to full limb  -HR      MLD to Full Limb Bilateral lower extremities  -HR      Head/Neck Treatment pre-auricular nodes;post-auricular nodes;occipital nodes;brief MLD  MLD to the scalp  -HR      Manual Lymphatic Drainage Comments worked on anterior and posterior legs in supine and prone.  -HR      Manual Therapy 60  -HR                User Key  (r) = Recorded By, (t) = Taken By, (c) = Cosigned By      Initials Name  Provider Type    HR Nicole Concepcion, PT, DPT, CLT-ISABEL Physical Therapist                    Therapy Education/Self Care 12315   Education offered today Educated about the lymph system.  Instructed on the HEP and MLD   Medbride Code    Ongoing HEP   Work on HEP, MLD, in order compression   Timed Minutes        Total Timed Treatment:     60   mins  Total Time of Visit:             60   mins         ASSESSMENT/PLAN     GOALS  Goals                                          Progress Note due by 5/1/25                                                      Recert due by 6/29/25   STG by: 6 weeks Comments Date Status   Patient will have a good basic understanding of lymphedema and its suggested risk reduction practices Educated about the lymph system 4/4/2025 Ongoing   Patient will be independent with remedial HEP for lymphedema Instructed on the HEP 4/4/2025 Ongoing   Patient will be independent with self MLD for lymphedema  Instructed on the MLD 4/4/25  Ongoing   If patient has an increase in truncal measurements with use of the basic pump, she will get a flexitouch and be independent with its use.          LTG by: 12 weeks         Patient will have appropriate compression garments for lymphedema maintenance         She will be able to perform regular daily activities without increased swelling in the legs.          Patient will be independent with comprehensive maintenance program for lymphedema         She will have a reduction in the hips and abdomen of at least 10 cm.                                 Anticipated CPT codes: Therapeutic Exercise 04826, Manual Therapy 18348, Therapeutic Activity 51049, and Self Care/Home Management 95353       Assessment/Plan     ASSESSMENT:   She had a better understanding of how to do some self massage at the [facility] [place]'s and the reasoning for that.  Her abdomen did feel swollen today and there is some overhang at the inguinal region from the abdomen down to the thigh.    PLAN:   Will  see patient 2 times a week for MLD.    SIGNATURE: Nicole Concepcion, PT, DPT, CLSHARON-YOSVANY HALL License #: [id number]  Electronically Signed on 4/11/2025        [address] [facility] [place] [address]  [phone]

## 2025-05-02 ENCOUNTER — HOSPITAL ENCOUNTER (OUTPATIENT)
Dept: PHYSICAL THERAPY | Facility: HOSPITAL | Age: 45
Setting detail: THERAPIES SERIES
Discharge: HOME | End: 2025-05-02
Payer: MEDICARE

## 2025-05-02 DIAGNOSIS — Q82.0 PRIMARY LYMPHEDEMA: ICD-10-CM

## 2025-05-02 DIAGNOSIS — M79.7 FIBROMYALGIA: ICD-10-CM

## 2025-05-02 DIAGNOSIS — E06.3 HYPOTHYROIDISM DUE TO HASHIMOTO'S THYROIDITIS: ICD-10-CM

## 2025-05-02 DIAGNOSIS — I89.0 LYMPHEDEMA OF BOTH LOWER EXTREMITIES: Primary | ICD-10-CM

## 2025-05-02 PROCEDURE — 97140 MANUAL THERAPY 1/> REGIONS: CPT

## 2025-05-02 PROCEDURE — 97110 THERAPEUTIC EXERCISES: CPT

## 2025-05-02 NOTE — THERAPY TREATMENT NOTE
Progress Note Addendum    Patient: Leatha Goldman           : 1980  Visit Date: 2025  Referring practitioner: FREDDY Peterson  Date of Initial Visit: Type: THERAPY  Episode: BLE lymphedema    Visit Diagnoses:    ICD-10-CM ICD-9-CM   1. Lymphedema of both lower extremities  I89.0 457.1   2. Primary lymphedema  Q82.0 457.1   3. Fibromyalgia  M79.7 729.1   4. Hypothyroidism due to Hashimoto's thyroiditis  E06.3 245.2          Clinical Progress: improved  Home Program Compliance: Yes  Progress toward previous goals: Partially Met  Prognosis to achieve goals: good    Objective     Assessment/Plan    Anticipated CPT codes: Therapeutic Exercise 01479, Manual Therapy 59318, Therapeutic Activity 11648, and Self Care/Home Management 41393    I reviewed the treatment and goals with Matilde Rose and agree with the POC.    SIGNATURE: Nicole Concepcion, PT, DPT, CLT-ISABEL, License #: [id number]  Electronically Signed on 2025

## 2025-05-02 NOTE — THERAPY TREATMENT NOTE
Physical Therapy Treatment Note and 30 Day Progress Note  [facility]  A department [address], [place] [address]    Patient: Leatha Goldman                                                 Visit Date: 2025  :     1980    Referring practitioner:    FREDDY Peterson  Date of Initial Visit:          Type: THERAPY  Episode: BLE lymphedema  Number of visits this episode: 4    Visit Diagnoses:    ICD-10-CM ICD-9-CM   1. Lymphedema of both lower extremities  I89.0 457.1   2. Primary lymphedema  Q82.0 457.1   3. Fibromyalgia  M79.7 729.1   4. Hypothyroidism due to Hashimoto's thyroiditis  E06.3 245.2     SUBJECTIVE     Subjective:  Her swelling increases with standing for several hours.  Doing yard work she has to take breaks. She is working on the HEP.  She slept  a lot yesterday as she could tell the rain was coming.      PAIN: 0/10         OBJECTIVE     Objective    Lymphedema       Row Name 25 1230             Subjective Pain    Able to rate subjective pain? yes  -AL      Pre-Treatment Pain Level 0  -AL      Post-Treatment Pain Level 0  -AL         Lymphedema Measurements    Measurement Type(s) Circumferential  -AL      Circumferential Areas Lower extremities;Trunk  -AL         BLE Circumferential (cm)    Measurement Location 1 BOT  -AL      Left 1 19.5 cm  -AL      Right 1 20.6 cm  -AL      Measurement Location 2 10  -AL      Left 2 25.3 cm  -AL      Right 2 25 cm  -AL      Measurement Location 3 10  -AL      Left 3 26.2 cm  -AL      Right 3 25.3 cm  -AL      Measurement Location 4 10  -AL      Left 4 33.8 cm  -AL      Right 4 33.6 cm  -AL      Measurement Location 5 10  -AL      Left 5 35.6 cm  -AL      Right 5 36.9 cm  -AL      Measurement Location 6 10  -AL      Left 6 36 cm  -AL      Right 6 37.1 cm  -AL      Measurement Location 7 10  -AL      Left 7 48 cm  -AL      Right 7 43.5 cm  -AL      LLE Circumferential Total 224.4 cm   -AL      RLE Circumferential Total 222 cm  -AL         Trunk Circumferential (cm)    Measurement Location 1 axillae  -AL      Trunk 1 99.4 cm  -AL      Measurement Location 2 umbilicus  -AL      Trunk 2 100.4 cm  -AL      Measurement Location 3 hips  -AL      Trunk 3 107 cm  -AL      Trunk Circumferential Total 306.8 cm  -AL         Manual Lymphatic Drainage    Manual Lymphatic Drainage initial sequence;opened regional lymph nodes;head/neck treatment;extremity treatment  -AL      Initial Sequence diaphragmatic breathing;full neck;abdomen  -AL      Abdomen superficial  -AL      Diaphragmatic Breathing X 9 with abdominals  -AL      Opened Regional Lymph Nodes axillary;inguinal  -AL      Axillary right;left  -AL      Inguinal right;left  -AL      Extremity Treatment MLD to full limb  -AL      MLD to Full Limb Bilateral lower extremities  -AL      Head/Neck Treatment --  -AL      Manual Lymphatic Drainage Comments worked on anterior and posterior legs in supine and prone.  -AL      Manual Therapy 50  -AL         Compression/Skin Care    Compression/Skin Care compression garment  -AL      Compression/Skin Care Comments She will look into a compression lea, we will order compression leggings.  -AL                User Key  (r) = Recorded By, (t) = Taken By, (c) = Cosigned By      Initials Name Provider Type    AL Lorelei Rose, PTA, CLT-ISABEL Physical Therapist Assistant                  Therapeutic Exercises    12902 Units Comments   Prone quad stretch     Stretch both hip flexors with quad stretch in the Bhupendra test position to each lower extremity  Work on at home   In long sitting patient stretched each hamstring  Work on this at home             Timed Minutes 10        Therapy Education/Self Care 80358   Education offered today Patient will need a size large Rejuva Seamless Compression Leggings.  Work on hip flexor stretch and also hamstring stretch.   Medbridge Code    Ongoing HEP   Work on HEP and MLD, continue to  use the basic pump.   Timed Minutes        Total Timed Treatment:     60   mins  Total Time of Visit:             60   mins         ASSESSMENT/PLAN     GOALS  Goals                                          Progress Note due by 6/1/25                                                      Recert due by 6/29/25   STG by: 6 weeks Comments Date Status   Patient will have a good basic understanding of lymphedema and its suggested risk reduction practices Continue to educate 5/2/25 Ongoing   Patient will be independent with remedial HEP for lymphedema She is working on the HEP 5/1/25 Ongoing   Patient will be independent with self MLD for lymphedema She is working on the MLD 5/1/25  Ongoing   If patient has an increase in truncal measurements with use of the basic pump, she will get a Flexitouch and be independent with its use.   She has been using the pump at night. 5/1/25 Ongoing   LTG by: 12 weeks         Patient will have appropriate compression garments for lymphedema maintenance  She has been wearing calf compression. Patient will need a size large Rejuva Seamless Compression Leggings 5/1/25 Ongoing   She will be able to perform regular daily activities without increased swelling in the legs.   Standing/working in the yard for several hours increases her swelling. 5/1/25 Ongoing   Patient will be independent with comprehensive maintenance program for lymphedema  She is working towards her home maintenance program. 5/1/25 Ongoing   She will have a reduction in the hips and abdomen of at least 10 cm.   No change in the size of the hips, she has had an increase in size of the abdomen.  5/1/25 Ongoing                          Anticipated CPT codes: Therapeutic Exercise 40163, Manual Therapy 58706, Therapeutic Activity 94545, and Self Care/Home Management 14063       Assessment/Plan     ASSESSMENT:   No significant change in the size of the right lower extremity, she has had an increase in the left lower extremity.  She  has had an increase in size of 7.4 cm as compared to the initial evaluation.  I did spend extra time working on MLD to the abdomen.  Because of the increased size of her abdomen patient would benefit from a pair of compression leggings, this will also be compression that she needs for the lower extremities.  Patient is currently working on complete decongestive therapy and using the pump.    Compression needed:  Rejuva Seamless Compression Leggings  Compression camisole      PLAN:   Will see patient 2 times a week for MLD.    SIGNATURE: Lorelei Rose PTA, MICHEALYOSVANY HALL License #: [id number]  Electronically Signed on 5/2/2025        [facility]  [place] [address]  [phone]

## 2025-05-05 ENCOUNTER — HOSPITAL ENCOUNTER (OUTPATIENT)
Dept: PHYSICAL THERAPY | Facility: HOSPITAL | Age: 45
Setting detail: THERAPIES SERIES
Discharge: HOME | End: 2025-05-05
Payer: MEDICARE

## 2025-05-05 DIAGNOSIS — I89.0 LYMPHEDEMA OF BOTH LOWER EXTREMITIES: Primary | ICD-10-CM

## 2025-05-05 DIAGNOSIS — Q82.0 PRIMARY LYMPHEDEMA: ICD-10-CM

## 2025-05-05 DIAGNOSIS — M79.7 FIBROMYALGIA: ICD-10-CM

## 2025-05-05 DIAGNOSIS — E06.3 HYPOTHYROIDISM DUE TO HASHIMOTO'S THYROIDITIS: ICD-10-CM

## 2025-05-05 PROCEDURE — 97140 MANUAL THERAPY 1/> REGIONS: CPT

## 2025-05-05 PROCEDURE — 97110 THERAPEUTIC EXERCISES: CPT

## 2025-05-05 NOTE — THERAPY TREATMENT NOTE
Physical Therapy Treatment Note  [facility]  A [address], [place] [address]    Patient: Leatha Goldman                                                 Visit Date: 2025  :     1980    Referring practitioner:    FREDYD Peterson  Date of Initial Visit:          Type: THERAPY  Episode: BLE lymphedema  Number of visits this episode: 5    Visit Diagnoses:    ICD-10-CM ICD-9-CM   1. Lymphedema of both lower extremities  I89.0 457.1   2. Primary lymphedema  Q82.0 457.1   3. Fibromyalgia  M79.7 729.1   4. Hypothyroidism due to Hashimoto's thyroiditis  E06.3 245.2     SUBJECTIVE     Subjective:  She has been working on the stretches.  She can tell she swelling in her arms and feet.    PAIN: 0/10         OBJECTIVE     Objective    Lymphedema       Row Name 25 1415             Subjective Pain    Able to rate subjective pain? yes  -AL      Pre-Treatment Pain Level 0  -AL      Post-Treatment Pain Level 0  -AL         Manual Lymphatic Drainage    Manual Lymphatic Drainage initial sequence;opened regional lymph nodes;head/neck treatment;extremity treatment  -AL      Initial Sequence diaphragmatic breathing;full neck;abdomen  -AL      Abdomen superficial  -AL      Diaphragmatic Breathing X 9 with abdominals  -AL      Opened Regional Lymph Nodes axillary;inguinal  -AL      Axillary right;left  -AL      Inguinal right;left  -AL      Extremity Treatment MLD to full limb  -AL      MLD to Full Limb B LE  -AL      Manual Lymphatic Drainage Comments worked on anterior and posterior legs in supine and prone.  -AL      Manual Therapy 50  -AL         Compression/Skin Care    Compression/Skin Care compression garment  -AL      Compression/Skin Care Comments We are waiting for the order for the compression to be signed by her provider.  -AL                User Key  (r) = Recorded By, (t) = Taken By, (c) = Cosigned By      Initials Name Provider  Type    AL Lorelei Rose, PTA, CLT-ISABEL Physical Therapist Assistant                    Therapeutic Exercises    38072 Units Comments   Prone quad stretch     Stretch both hip flexors with quad stretch in the Bhupendra test position to each lower extremity     Stretched both hamstrings               Timed Minutes 10        Therapy Education/Self Care 43216   Education offered today Patient will need a size large Rejuva Seamless Compression Leggings.  Work on hip flexor stretch and also hamstring stretch.   Medbridge Code    Ongoing HEP   Work on HEP and MLD, continue to use the basic pump.   Timed Minutes        Total Timed Treatment:     60   mins  Total Time of Visit:             60   mins         ASSESSMENT/PLAN     GOALS  Goals                                          Progress Note due by 6/1/25                                                      Recert due by 6/29/25   STG by: 6 weeks Comments Date Status   Patient will have a good basic understanding of lymphedema and its suggested risk reduction practices Continue to educate 5/2/25 Ongoing   Patient will be independent with remedial HEP for lymphedema She is working on the HEP 5/1/25 Ongoing   Patient will be independent with self MLD for lymphedema She is working on the MLD 5/1/25  Ongoing   If patient has an increase in truncal measurements with use of the basic pump, she will get a Flexitouch and be independent with its use.   She has been using the pump at night. 5/1/25 Ongoing   LTG by: 12 weeks         Patient will have appropriate compression garments for lymphedema maintenance  She has been wearing calf compression. Patient will need a size large Rejuva Seamless Compression Leggings.  We are waiting on the order to be signed by her provider. 5/5/2025 Ongoing   She will be able to perform regular daily activities without increased swelling in the legs.   Standing/working in the yard for several hours increases her swelling. 5/1/25 Ongoing   Patient will  be independent with comprehensive maintenance program for lymphedema  She is working towards her home maintenance program. 5/1/25 Ongoing   She will have a reduction in the hips and abdomen of at least 10 cm.   No change in the size of the hips, she has had an increase in size of the abdomen.  5/1/25 Ongoing                          Anticipated CPT codes: Therapeutic Exercise 74042, Manual Therapy 16329, Therapeutic Activity 26162, and Self Care/Home Management 58950       Assessment/Plan     ASSESSMENT: All information for the compression leggings is in [place], we are waiting for her care provider to sign the order for the garments.  Patient is working on stretching for the lower extremities she has added a piriformis stretch as well.  Patient does have minimal amount of swelling around her ankles today.      Compression needed:  Rejuva Seamless Compression Leggings  Compression camisole      PLAN:   Will see patient 2 times a week for MLD.    SIGNATURE: Lorelei Rose PTA, [facility] [place] License #: [id number]  Electronically Signed on 5/5/2025        [facility] [place] [address]  [phone]

## 2025-05-07 ENCOUNTER — APPOINTMENT (OUTPATIENT)
Dept: PHYSICAL THERAPY | Facility: HOSPITAL | Age: 45
End: 2025-05-07
Payer: MEDICARE

## 2025-05-12 ENCOUNTER — APPOINTMENT (OUTPATIENT)
Dept: PHYSICAL THERAPY | Facility: HOSPITAL | Age: 45
End: 2025-05-12
Payer: MEDICARE

## 2025-05-14 ENCOUNTER — APPOINTMENT (OUTPATIENT)
Dept: PHYSICAL THERAPY | Facility: HOSPITAL | Age: 45
End: 2025-05-14
Payer: MEDICARE

## 2025-05-19 ENCOUNTER — APPOINTMENT (OUTPATIENT)
Dept: PHYSICAL THERAPY | Facility: HOSPITAL | Age: 45
End: 2025-05-19
Payer: MEDICARE

## 2025-05-21 ENCOUNTER — HOSPITAL ENCOUNTER (OUTPATIENT)
Dept: PHYSICAL THERAPY | Facility: HOSPITAL | Age: 45
Setting detail: THERAPIES SERIES
Discharge: HOME | End: 2025-05-21
Payer: MEDICARE

## 2025-05-21 DIAGNOSIS — I89.0 LYMPHEDEMA OF BOTH LOWER EXTREMITIES: Primary | ICD-10-CM

## 2025-05-21 DIAGNOSIS — Q82.0 PRIMARY LYMPHEDEMA: ICD-10-CM

## 2025-05-21 DIAGNOSIS — M79.7 FIBROMYALGIA: ICD-10-CM

## 2025-05-21 PROCEDURE — 97140 MANUAL THERAPY 1/> REGIONS: CPT | Performed by: PHYSICAL THERAPIST

## 2025-05-21 NOTE — THERAPY TREATMENT NOTE
Physical Therapy Treatment Note  [facility]  A [address] [address]    Patient: Leatha Goldman                                                 Visit Date: 2025  :     1980    Referring practitioner:    FREDDY Peterson  Date of Initial Visit:          Type: THERAPY  Episode: BLE lymphedema  Number of visits this episode: 6    Visit Diagnoses:    ICD-10-CM ICD-9-CM   1. Lymphedema of both lower extremities  I89.0 457.1   2. Primary lymphedema  Q82.0 457.1   3. Fibromyalgia  M79.7 729.1     SUBJECTIVE     Subjective:  She had a working out in the yard this weekend.  She feels like she is swollen everywhere.    PAIN: 0/10         OBJECTIVE     Objective    Lymphedema       Row Name 25 1300             Subjective Pain    Able to rate subjective pain? yes  -HR      Pre-Treatment Pain Level 0  -HR         Manual Lymphatic Drainage    Manual Lymphatic Drainage initial sequence;opened regional lymph nodes;head/neck treatment;extremity treatment  -HR      Initial Sequence diaphragmatic breathing;full neck;abdomen  -HR      Abdomen superficial  -HR      Opened Regional Lymph Nodes axillary;inguinal  -HR      Axillary right;left  -HR      Inguinal right;left  -HR      Extremity Treatment MLD to full limb  -HR      MLD to Full Limb BLE and BUE  -HR      Head/Neck Treatment pre-auricular nodes;post-auricular nodes;occipital nodes;brief MLD  -HR      Manual Lymphatic Drainage Comments worked on anterior and posterior legs in supine and prone. Extra time at each axilla/trunk  -HR      Manual Therapy 75  -HR         Compression/Skin Care    Compression/Skin Care Comments She has the leggings and has worn them the past 2 days. May want a set that is stronger compression.  -HR                User Key  (r) = Recorded By, (t) = Taken By, (c) = Cosigned By      Initials Name Provider Type    HR Nicole Concepcion, PT, DPT,  MICHE[facility] Physical Therapist                  Therapy Education/Self Care 73381   Education offered today Patient will need a size large Rejuva Seamless Compression Leggings.  Work on hip flexor stretch and also hamstring stretch.   Medbridge Code    Ongoing HEP   Work on HEP and MLD, continue to use the basic pump.   Timed Minutes        Total Timed Treatment:     60   mins  Total Time of Visit:             60   mins         ASSESSMENT/PLAN     GOALS  Goals                                          Progress Note due by 6/1/25                                                      Recert due by 6/29/25   STG by: 6 weeks Comments Date Status   Patient will have a good basic understanding of lymphedema and its suggested risk reduction practices Continue to educate 5/2/25 Ongoing   Patient will be independent with remedial HEP for lymphedema She is working on the HEP 5/1/25 Ongoing   Patient will be independent with self MLD for lymphedema She is working on the MLD 5/1/25  Ongoing   If patient has an increase in truncal measurements with use of the basic pump, she will get a Flexitouch and be independent with its use.   She has been using the pump at night. 5/21/25 Ongoing   LTG by: 12 weeks         Patient will have appropriate compression garments for lymphedema maintenance She has the compression leggings and is trying to use them. 5/21/25 Ongoing   She will be able to perform regular daily activities without increased swelling in the legs.   Standing/working in the yard for several hours increases her swelling. 5/1/25 Ongoing   Patient will be independent with comprehensive maintenance program for lymphedema  She is working towards her home maintenance program. 5/1/25 Ongoing   She will have a reduction in the hips and abdomen of at least 10 cm.   No change in the size of the hips, she has had an increase in size of the abdomen.  5/1/25 Ongoing                          Anticipated CPT codes: Therapeutic Exercise 35661,  Manual Therapy 39982, Therapeutic Activity 30279, and Self Care/Home Management 34887       Assessment/Plan     ASSESSMENT: She has the leggings and has worn them the past 2 days but states they are really long and come all the way over her feet.  They are comfortable to her though and she does think she could tolerate a stronger compression.  Due to her fibromyalgia, she was afraid it would be uncomfortable.  She had a lot of puffiness at the SCF on each side right worse than the left and in the tissue anterior and posterior to each axilla.  Abdomen seemed distended as well and I spent extra time there.      Compression needed:  Rejuva Seamless Compression Leggings  Compression camisole      PLAN:   Will see patient 2 times a week for MLD.    SIGNATURE: Nicole Concepcion, PT, DPT, [facility] License #: [id number]  Electronically Signed on 5/21/2025        [address] [place] [address]. [address]  [phone]

## 2025-05-27 ENCOUNTER — APPOINTMENT (OUTPATIENT)
Dept: PHYSICAL THERAPY | Facility: HOSPITAL | Age: 45
End: 2025-05-27
Payer: MEDICARE

## 2025-05-29 ENCOUNTER — APPOINTMENT (OUTPATIENT)
Dept: PHYSICAL THERAPY | Facility: HOSPITAL | Age: 45
End: 2025-05-29
Payer: MEDICARE

## 2025-06-03 ENCOUNTER — HOSPITAL ENCOUNTER (OUTPATIENT)
Dept: PHYSICAL THERAPY | Facility: HOSPITAL | Age: 45
Setting detail: THERAPIES SERIES
Discharge: HOME | End: 2025-06-03
Payer: MEDICARE

## 2025-06-03 DIAGNOSIS — E06.3 HYPOTHYROIDISM DUE TO HASHIMOTO'S THYROIDITIS: ICD-10-CM

## 2025-06-03 DIAGNOSIS — M79.7 FIBROMYALGIA: ICD-10-CM

## 2025-06-03 DIAGNOSIS — I89.0 LYMPHEDEMA OF BOTH LOWER EXTREMITIES: Primary | ICD-10-CM

## 2025-06-03 DIAGNOSIS — Q82.0 PRIMARY LYMPHEDEMA: ICD-10-CM

## 2025-06-03 PROCEDURE — 97140 MANUAL THERAPY 1/> REGIONS: CPT

## 2025-06-03 NOTE — THERAPY TREATMENT NOTE
Physical Therapy Treatment Note and 30 Day Progress Note  [facility]  [address], [place] [address]    Patient: Leatha Goldman                                                 Visit Date: 6/3/2025  :     1980    Referring practitioner:    FREDDY Peterson  Date of Initial Visit:          Type: THERAPY  Episode: BLE lymphedema  Number of visits this episode: 7    Visit Diagnoses:    ICD-10-CM ICD-9-CM   1. Lymphedema of both lower extremities  I89.0 457.1   2. Primary lymphedema  Q82.0 457.1   3. Fibromyalgia  M79.7 729.1   4. Hypothyroidism due to Hashimoto's thyroiditis  E06.3 245.2     SUBJECTIVE     Subjective:  She had a flair last week and was really sick, she slept a lot. The rain also affects her and makes her feel bad. She has the compression leggings, they are long in length and long in the crotch, she does still want to keep them.     PAIN: 0/10         OBJECTIVE     Objective    Lymphedema       Row Name 25 1230             Subjective Pain    Able to rate subjective pain? yes  -AL      Pre-Treatment Pain Level 0  -AL      Post-Treatment Pain Level 0  -AL         Lymphedema Measurements    Measurement Type(s) Circumferential  -AL      Circumferential Areas Lower extremities;Trunk  -AL         BLE Circumferential (cm)    Measurement Location 1 BOT  -AL      Left 1 20 cm  -AL      Right 1 20.5 cm  -AL      Measurement Location 2 10  -AL      Left 2 25.4 cm  -AL      Right 2 25.2 cm  -AL      Measurement Location 3 10  -AL      Left 3 25.7 cm  -AL      Right 3 24.8 cm  -AL      Measurement Location 4 10  -AL      Left 4 34 cm  -AL      Right 4 35.2 cm  -AL      Measurement Location 5 10  -AL      Left 5 36.2 cm  -AL      Right 5 36.5 cm  -AL      Measurement Location 6 10  -AL      Left 6 35.9 cm  -AL      Right 6 35.9 cm  -AL      Measurement Location 7 10  -AL      Left 7 47 cm  -AL      Right 7 43.5 cm  -AL       LLE Circumferential Total 224.2 cm  -AL      RLE Circumferential Total 221.6 cm  -AL         Manual Lymphatic Drainage    Manual Lymphatic Drainage initial sequence;opened regional lymph nodes;head/neck treatment;extremity treatment  -AL      Initial Sequence diaphragmatic breathing;full neck;abdomen  -AL      Abdomen superficial  -AL      Opened Regional Lymph Nodes axillary;inguinal  -AL      Axillary right;left  -AL      Inguinal right;left  -AL      Extremity Treatment MLD to full limb  -AL      MLD to Full Limb BLE and BUE  -AL      Head/Neck Treatment --  -AL      Manual Lymphatic Drainage Comments Spent extra time working on MLD to each upper leg  -AL      Manual Therapy 55  -AL         Compression/Skin Care    Compression/Skin Care Comments Continue to wear compression  -AL                User Key  (r) = Recorded By, (t) = Taken By, (c) = Cosigned By      Initials Name Provider Type    Lorelei Grey PTA, CLT-LANA Physical Therapist Assistant                    Therapy Education/Self Care 98548   Education offered today Work on hip flexor stretch and also hamstring stretch.  Contact Madeline at [facility] to be measured for additional compression leggings.   Medbridge Code    Ongoing HEP   Work on HEP and MLD, continue to use the basic pump.   Timed Minutes        Total Timed Treatment:     55   mins  Total Time of Visit:             55   mins         ASSESSMENT/PLAN     GOALS  Goals                                          Progress Note due by 7/3/25                                                      Recert due by 6/29/25   STG by: 6 weeks Comments Date Status   Patient will have a good basic understanding of lymphedema and its suggested risk reduction practices She has a good understanding of the lymphedema. 6/3/2025 Met   Patient will be independent with remedial HEP for lymphedema She is working on the HEP 6/3/2025 Met   Patient will be independent with self MLD for lymphedema She is working on the MLD  and she also has the pump. 6/3/2025 Met   If patient has an increase in truncal measurements with use of the basic pump, she will get a Flexitouch and be independent with its use.   She has been using the Flexitouch pump most nights. 6/3/2025 Ongoing   LTG by: 12 weeks         Patient will have appropriate compression garments for lymphedema maintenance She will go by [facility] to be measured for more compression leggings 6/3/2025 Ongoing   She will be able to perform regular daily activities without increased swelling in the legs.  She has been tired lately needing more rest. 6/3/2025 Ongoing   Patient will be independent with comprehensive maintenance program for lymphedema She continues to work towards her home maintenance program 6/3/2025 Ongoing   She will have a reduction in the hips and abdomen of at least 10 cm.  The trunk was not measured today, we will do this next treatment. 6/3/2025 Ongoing                          Anticipated CPT codes: Therapeutic Exercise 31664, Manual Therapy 47705, Therapeutic Activity 14616, and Self Care/Home Management 76905       Assessment/Plan     ASSESSMENT: Patient will contact Madeline from [facility] to be measured for more compression leggings.  She does have a pair compression leggings but they are longer and she needs a more petite size.  She has been very tired and recently been sick so her fatigue level has affected has affected her activity throughout the day.  Patient continues to use the pump most nights.      Compression needed:  Compression Leggings  Compression camisole      PLAN:   Will see patient 2 times a week for MLD.    SIGNATURE: Lorelei Rose PTA, [facility] [place] License #: [id number]  Electronically Signed on 6/3/2025        [address] [place] [address]. [address]  [phone]

## 2025-06-03 NOTE — THERAPY TREATMENT NOTE
Progress Note Addendum    Patient: Leatha Goldman           : 1980  Visit Date: 6/3/2025  Referring practitioner: FREDDY Peterson  Date of Initial Visit: Type: THERAPY  Episode: BLE lymphedema    Visit Diagnoses:    ICD-10-CM ICD-9-CM   1. Lymphedema of both lower extremities  I89.0 457.1   2. Primary lymphedema  Q82.0 457.1   3. Fibromyalgia  M79.7 729.1   4. Hypothyroidism due to Hashimoto's thyroiditis  E06.3 245.2          Clinical Progress: improved  Home Program Compliance: Yes  Progress toward previous goals: Partially Met  Prognosis to achieve goals: good    Objective     Assessment & Plan       Assessment  Barriers to therapy: Multiple comorbidities that cause chronic pain and inflammation  Prognosis: good    Plan  Therapy options: will be seen for skilled therapy services  Frequency: 2x week  Duration in weeks: 12  Treatment plan discussed with: MAHESH        Anticipated CPT codes: Therapeutic Exercise 77864, Manual Therapy 45890, and Self Care/Home Management 06055    I reviewed the treatment and goals with Matilde Rose PTA and agree with the POC.    SIGNATURE: Terry Mendoza PT, License #: [id number]  Electronically Signed on 6/3/2025

## 2025-06-11 ENCOUNTER — APPOINTMENT (OUTPATIENT)
Dept: PHYSICAL THERAPY | Facility: HOSPITAL | Age: 45
End: 2025-06-11
Payer: MEDICARE

## 2025-06-17 ENCOUNTER — APPOINTMENT (OUTPATIENT)
Dept: PHYSICAL THERAPY | Facility: HOSPITAL | Age: 45
End: 2025-06-17
Payer: MEDICARE

## 2025-06-19 ENCOUNTER — APPOINTMENT (OUTPATIENT)
Dept: PHYSICAL THERAPY | Facility: HOSPITAL | Age: 45
End: 2025-06-19
Payer: MEDICARE

## 2025-06-24 ENCOUNTER — APPOINTMENT (OUTPATIENT)
Dept: PHYSICAL THERAPY | Facility: HOSPITAL | Age: 45
End: 2025-06-24
Payer: MEDICARE

## 2025-06-26 ENCOUNTER — APPOINTMENT (OUTPATIENT)
Dept: PHYSICAL THERAPY | Facility: HOSPITAL | Age: 45
End: 2025-06-26
Payer: MEDICARE

## (undated) DEVICE — SOUND/DILATOR (ACCESSORY FOR CLEARVIEW UTERINE MANIPULATOR): Brand: CLEARVIEW

## (undated) DEVICE — SOLUTION IRRIG 3000ML 0.9% SOD CHL USP UROMATIC PLAS CONT

## (undated) DEVICE — SUTURE CHROMIC GUT SZ 4-0 L18IN ABSRB BRN L19MM PS-2 3/8 1637G

## (undated) DEVICE — SOLUTION IV 1000ML 0.9% SOD CHL PH 5 INJ USP VIAFLX PLAS

## (undated) DEVICE — MEDI-VAC YANK SUCT HNDL W/TPRD BULBOUS TIP: Brand: CARDINAL HEALTH

## (undated) DEVICE — GOWN,PLEAT,SPECIALTY,XL,STRL: Brand: MEDLINE

## (undated) DEVICE — SOLUTION IV IRRIG POUR BRL 0.9% SODIUM CHL 2F7124

## (undated) DEVICE — E-Z CLEAN, NON-STICK, PTFE COATED, ELECTROSURGICAL BLADE ELECTRODE, MODIFIED EXTENDED INSULATION, 2.5 INCH (6.35 CM): Brand: MEGADYNE

## (undated) DEVICE — DEVICE ABLATN ENDOMET US NOVASURE V5

## (undated) DEVICE — DEVICE TISS REM DIA3MM L25.25IN ENDOSCP F/ IU POLYPS

## (undated) DEVICE — SUTURE VCRL SZ 3-0 L27IN ABSRB UD L19MM PS-2 3/8 CIR PRIM J427H

## (undated) DEVICE — SET ENDOSCP SEAL HYSTEROSCOPE RIG OUTFLO CHN DISP MYOSURE

## (undated) DEVICE — GLOVE SURG SZ 75 CRM LTX FREE POLYISOPRENE POLYMER BEAD ANTI

## (undated) DEVICE — SPONGE GZ L3FTXW2IN COT VAG XR DTECT 4 PLY MESH PK RL

## (undated) DEVICE — SURGICAL PROCEDURE PACK GYNECOLOGIC MIN LOURDES HOSP

## (undated) DEVICE — TOWEL,OR,DSP,ST,BLUE,DLX,4/PK,20PK/CS: Brand: MEDLINE

## (undated) DEVICE — LEGGINGS, PAIR, CLEAR, STERILE: Brand: MEDLINE

## (undated) DEVICE — SUTURE VCRL SZ 2-0 L27IN ABSRB UD L26MM SH 1/2 CIR J417H

## (undated) DEVICE — FLUID MGMT SYS FLUENT KIT 6/PK

## (undated) DEVICE — PAD MAT L11IN POLYMER HVY ABSRB NONADHESIVE TAIL

## (undated) DEVICE — SUTURE MCRYL SZ 4-0 L18IN ABSRB UD L19MM PS-2 3/8 CIR PRIM Y496G

## (undated) DEVICE — SOLUTION IV 250ML 0.9% SOD CHL PH 5 INJ USP VIAFLX PLAS

## (undated) DEVICE — GOWN,PREVENTION PLUS,XLN/XL,ST,24/CS: Brand: MEDLINE

## (undated) DEVICE — MARKER,SKIN,WI/RULER AND LABELS: Brand: MEDLINE

## (undated) DEVICE — CONTAINER,SPECIMEN,OR STERILE,4OZ: Brand: MEDLINE

## (undated) DEVICE — SUTURE CHROMIC GUT SZ 4-0 L27IN ABSRB BRN L17MM RB-1 1/2 U203H

## (undated) DEVICE — HYPODERMIC SAFETY NEEDLE: Brand: MAGELLAN